# Patient Record
Sex: MALE | Race: WHITE | NOT HISPANIC OR LATINO | Employment: FULL TIME | URBAN - METROPOLITAN AREA
[De-identification: names, ages, dates, MRNs, and addresses within clinical notes are randomized per-mention and may not be internally consistent; named-entity substitution may affect disease eponyms.]

---

## 2017-08-05 ENCOUNTER — ALLSCRIPTS OFFICE VISIT (OUTPATIENT)
Dept: OTHER | Facility: OTHER | Age: 47
End: 2017-08-05

## 2017-09-01 ENCOUNTER — GENERIC CONVERSION - ENCOUNTER (OUTPATIENT)
Dept: OTHER | Facility: OTHER | Age: 47
End: 2017-09-01

## 2017-09-01 ENCOUNTER — ALLSCRIPTS OFFICE VISIT (OUTPATIENT)
Dept: OTHER | Facility: OTHER | Age: 47
End: 2017-09-01

## 2017-09-02 LAB
A/G RATIO (HISTORICAL): 2.7 (ref 1.2–2.2)
ALBUMIN SERPL BCP-MCNC: 4.8 G/DL (ref 3.5–5.5)
ALP SERPL-CCNC: 30 IU/L (ref 39–117)
ALT SERPL W P-5'-P-CCNC: 18 IU/L (ref 0–44)
AMYLASE (HISTORICAL): 67 U/L (ref 31–124)
AST SERPL W P-5'-P-CCNC: 24 IU/L (ref 0–40)
BILIRUB SERPL-MCNC: 0.5 MG/DL (ref 0–1.2)
BUN SERPL-MCNC: 13 MG/DL (ref 6–24)
BUN/CREA RATIO (HISTORICAL): 14 (ref 9–20)
CALCIUM SERPL-MCNC: 9.2 MG/DL (ref 8.7–10.2)
CHLORIDE SERPL-SCNC: 97 MMOL/L (ref 96–106)
CHOLEST SERPL-MCNC: 247 MG/DL (ref 100–199)
CHOLEST/HDLC SERPL: 3.4 RATIO UNITS (ref 0–5)
CO2 SERPL-SCNC: 23 MMOL/L (ref 18–29)
CREAT SERPL-MCNC: 0.91 MG/DL (ref 0.76–1.27)
DEPRECATED RDW RBC AUTO: 13.6 % (ref 12.3–15.4)
EGFR AFRICAN AMERICAN (HISTORICAL): 116 ML/MIN/1.73
EGFR-AMERICAN CALC (HISTORICAL): 101 ML/MIN/1.73
GLUCOSE SERPL-MCNC: 90 MG/DL (ref 65–99)
HCT VFR BLD AUTO: 43 % (ref 37.5–51)
HDLC SERPL-MCNC: 72 MG/DL
HGB BLD-MCNC: 14.1 G/DL (ref 12.6–17.7)
INTERPRETATION (HISTORICAL): NORMAL
LDLC SERPL CALC-MCNC: 153 MG/DL (ref 0–99)
LIPASE SERPL-CCNC: 26 U/L (ref 0–59)
MCH RBC QN AUTO: 31.3 PG (ref 26.6–33)
MCHC RBC AUTO-ENTMCNC: 32.8 G/DL (ref 31.5–35.7)
MCV RBC AUTO: 96 FL (ref 79–97)
PLATELET # BLD AUTO: 279 X10E3/UL (ref 150–379)
POTASSIUM SERPL-SCNC: 4.4 MMOL/L (ref 3.5–5.2)
RBC (HISTORICAL): 4.5 X10E6/UL (ref 4.14–5.8)
SODIUM SERPL-SCNC: 139 MMOL/L (ref 134–144)
TOT. GLOBULIN, SERUM (HISTORICAL): 1.8 G/DL (ref 1.5–4.5)
TOTAL PROTEIN (HISTORICAL): 6.6 G/DL (ref 6–8.5)
TRIGL SERPL-MCNC: 110 MG/DL (ref 0–149)
TSH SERPL DL<=0.05 MIU/L-ACNC: 1.2 UIU/ML (ref 0.45–4.5)
VLDLC SERPL CALC-MCNC: 22 MG/DL (ref 5–40)
WBC # BLD AUTO: 5.2 X10E3/UL (ref 3.4–10.8)

## 2017-09-06 ENCOUNTER — GENERIC CONVERSION - ENCOUNTER (OUTPATIENT)
Dept: OTHER | Facility: OTHER | Age: 47
End: 2017-09-06

## 2018-01-09 ENCOUNTER — GENERIC CONVERSION - ENCOUNTER (OUTPATIENT)
Dept: OTHER | Facility: OTHER | Age: 48
End: 2018-01-09

## 2018-01-10 NOTE — RESULT NOTES
Verified Results  (1) CBC/ PLT (NO DIFF) [id number] 12:00AM Christa Rudder     Test Name Result Flag Reference   WBC 5 2 x10E3/uL  3 4-10 8   RBC 4 50 x10E6/uL  4 14-5 80   Hemoglobin 14 1 g/dL  12 6-17 7   Hematocrit 43 0 %  37 5-51 0   MCV 96 fL  79-97   MCH 31 3 pg  26 6-33 0   MCHC 32 8 g/dL  31 5-35 7   RDW 13 6 %  12 3-15 4   Platelets 669 [id number]/FQ  150-379     (1) TSH WITH FT4 REFLEX 01Sep2017 12:00AM Christa Rudder     Test Name Result Flag Reference   TSH 1 200 uIU/mL  0 450-4 500     [facility]) Cardiovascular Risk Assessment 01Sep2017 12:00AM Christa Rudder     Test Name Result Flag Reference   Interpretation Note     -------------------------------  CARDIOVASCULAR REPORT:  -------------------------------  Current available clinical information suggests the  patient's risk is at least LOW  One major CHD risk factor is  present (age over 39)  If the patient has CHD or a CHD risk  equivalent, the risk category is high  If patient does not  have CHD or a CHD risk equivalent, consider use of the  Pooled Cohort Equations to estimate 10-year CVD risk, as  individuals with greater than 7 5% risk may warrant more  intensive therapy  The calculator can be found at:  http://tools  cardiosource org/JRFMF-Mcul-Pupgvtnlv/  -  Insulin resistance, obesity, excessive alcohol use, smoking,  nephrotic syndrome, liver disease, and certain medications  can cause secondary dyslipidemia  Consider evaluation if  clinically indicated  -  Fasting status is unknown; lipid assessment and treatment  suggestions assume patient was fasting  Therapeutic  lifestyle changes are always valuable to achieve optimal  blood lipid status (diet, exercise, weight management)  -------------------------------  LIPID MANAGEMENT  Select one patient risk category based upon medical history  and clinical judgment   Additional risk factors such as  personal or family history of premature CHD, smoking, and  hypertension modify a patient's goals of therapy  In CVD  prevention, the intensity of therapy should be adjusted to  the level of patient risk  MODERATE intensity statin therapy  generally results in an average LDL-C reduction of 30% to  less than 50% from the untreated baseline  Examples include  (daily doses): atorvastatin 10-20 mg, rosuvastatin 5-10 mg,  simvastatin 20-40 mg, pravastatin 40-80 mg, lovastatin 40  mg  HIGH intensity statin therapy generally results in an  average LDL-C reduction of 50% or more from the untreated  baseline  Examples include (daily doses): atorvastatin 40-80  mg and rosuvastatin 20 mg   -------------------------------  LOW RISK ASSESSMENT AND TREATMENT SUGGESTIONS  -------------------------------  LDL-C is acceptable, was 174 and now is 153 mg/dL  Non-HDL  Cholesterol is acceptable, was 190 and now is 175 mg/dL  -  Considerations for use of statin therapy include family  history of premature atherosclerotic disease, elevated  coronary artery calcium score, ankle-brachial index < 0 9,  elevated CRP, or elevated 10-year or lifetime CVD risk   -------------------------------  INTERMEDIATE RISK ASSESSMENT AND TREATMENT SUGGESTIONS  -------------------------------  LDL-C is borderline high, was 174 and now is 153 mg/dL  Non-HDL Cholesterol is borderline high, was 190 and now is  175 mg/dL  -  Consider beginning or increasing statin  Factors that may  influence statin use include family history of premature  atherosclerotic disease, elevated coronary artery calcium  score, ankle-brachial index < 0 9, elevated CRP, or elevated  10-year or lifetime CVD risk  If statin cannot be tolerated  or increased, alternatives include use of an intestinal  agent (ezetimibe or bile acid sequestrant) or niacin   -------------------------------  HIGH RISK ASSESSMENT AND TREATMENT SUGGESTIONS  -------------------------------  LDL-C is high, was 174 and now is 153 mg/dL  Non-HDL  Cholesterol is high, was 190 and now is 175 mg/dL    -  Begin statin  If statin already in use, consider increasing  dose to achieve at least a 50% LDL reduction from baseline  Moderate or high intensity statin is preferred  If statin  cannot be tolerated or increased, alternatives include use  of an intestinal agent (ezetimibe or bile acid sequestrant)  or niacin   -------------------------------  DISCLAIMER  These assessments and treatment suggestions are provided as  a convenience in support of the physician-patient  relationship and are not intended to replace the physician's  clinical judgment  They are derived from the national  guidelines in addition to other evidence and expert opinion  The clinician should consider this information within the  context of clinical opinion and the individual patient  SEE GUIDANCE FOR CARDIOVASCULAR REPORT: National Heart,  Lung, and Blood Buchanan's Third Report of the NCEP Expert  Panel on Detection, Evaluation and Treatment of High Blood  Cholesterol in Adults (ATP III) (2002  NIH publication  ); Kavita et al  Diabetes Care 2008; 31(4):811-82;  Conthaleigh et al  Clin Chem 2009; 55(3):407-419; Miko Pacheco et  al  2013 ACC/AHA guideline on the treatment of blood  cholesterol to reduce atherosclerotic cardiovascular risk in  adults: a report of the [employer] of  Cardiology/American Heart Association Task Force on Practice  Guidelines  Circulation 7564;280(GNM 2):S1? S45  PDF Image            Signatures   Electronically signed by : JUANA Gabriel; Sep  6 2017  3:29PM EST                       (Author)

## 2018-01-10 NOTE — MISCELLANEOUS
Message  Return to work or school:   Estephania Harrell is under my professional care  He was seen in my office on 9/1/2017  Irvin Donaldson, MSN, APN          Signatures   Electronically signed by : JUANA Augustine; Sep  1 2017 10:01AM EST                       (Author)

## 2018-01-10 NOTE — RESULT NOTES
Verified Results  (1) CBC/ PLT (NO DIFF) [id number] 10:39AM [facility]     Test Name Result Flag Reference   WBC 8 0 x10E3/uL  3 4-10 8   RBC 4 61 x10E6/uL  4 14-5 80   Hemoglobin 14 6 g/dL  12 6-17 7   Hematocrit 42 4 %  37 5-51 0   MCV 92 fL  79-97   MCH 31 7 pg  26 6-33 0   MCHC 34 4 g/dL  31 5-35 7   RDW 13 5 %  12 3-15 4   Platelets 861 [id number]/GQ  150-379     (1) COMPREHENSIVE METABOLIC PANEL [id number] 91:10XN [facility]     Test Name Result Flag Reference   Glucose, Serum 100 mg/dL H 65-99   BUN 12 mg/dL  6-24   Creatinine, Serum 0 79 mg/dL  0 76-1 27   eGFR If NonAfricn Am 108 mL/min/1 73  >59   eGFR If Africn Am 125 mL/min/1 73  >59   BUN/Creatinine Ratio 15  9-20   Sodium, Serum 141 mmol/L  134-144   Potassium, Serum 4 8 mmol/L  3 5-5 2   Chloride, Serum 99 mmol/L     Carbon Dioxide, Total 23 mmol/L  18-29   Calcium, Serum 9 7 mg/dL  8 7-10 2   Protein, Total, Serum 6 7 g/dL  6 0-8 5   Albumin, Serum 4 8 g/dL  3 5-5 5   Globulin, Total 1 9 g/dL  1 5-4 5   A/G Ratio 2 5  1 1-2 5   Bilirubin, Total 0 4 mg/dL  0 0-1 2   Alkaline Phosphatase, S 31 IU/L L    AST (SGOT) 20 IU/L  0-40   ALT (SGPT) 21 IU/L  0-44     (1) PSA (SCREEN) (Dx V76 44 Screen for Prostate Cancer) [id number] 10:39AM [facility]     Test Name Result Flag Reference   Prostate Specific Ag, Serum 0 9 ng/mL  0 0-4 0   Roche ECLIA methodology  According to the American Urological Association, Serum PSA should  decrease and remain at undetectable levels after radical  prostatectomy  The AUA defines biochemical recurrence as an initial  PSA value 0 2 ng/mL or greater followed by a subsequent confirmatory  PSA value 0 2 ng/mL or greater  Values obtained with different assay methods or kits cannot be used  interchangeably  Results cannot be interpreted as absolute evidence  of the presence or absence of malignant disease       (1) LIPID PANEL, FASTING [id number] 10:39AM Casandra Romano     Test Name Result Flag Reference Cholesterol, Total 266 mg/dL H 100-199   Triglycerides 78 mg/dL  0-149   HDL Cholesterol 76 mg/dL  >39   According to ATP-III Guidelines, HDL-C >59 mg/dL is considered a  negative risk factor for CHD  VLDL Cholesterol [place] 16 mg/dL  5-40   LDL Cholesterol Calc 174 mg/dL H 0-99   T  Chol/HDL Ratio 3 5 ratio units  0 0-5 0   T  Chol/HDL Ratio                                                             Men  Women                                               1/2 Avg  Risk  3 4    3 3                                                   Avg Risk  5 0    4 4                                                2X Avg  Risk  9 6    7 1                                                3X Avg  Risk 23 4   11 0     (1) TSH [id number] 10:39AM [facility]     Test Name Result Flag Reference   TSH 0 873 uIU/mL  0 450-4 500     [facility]) Cardiovascular Risk Assessment [id number] 10:39AM [facility]     Test Name Result Flag Reference   Interpretation Note     -------------------------------  CARDIOVASCULAR REPORT:  -------------------------------  Current available clinical information suggests the  patient's risk is at least LOW  If the patient has two or  more major risk factors, the risk category is intermediate  If the patient has CHD or a CHD risk equivalent, the risk  category is high  If patient does not have CHD or a CHD risk  equivalent, consider use of the Pooled Cohort Equations to  estimate 10-year CVD risk, as individuals with greater than  7 5% risk may warrant more intensive therapy  The calculator  can be found at:  http://tools  cardiosource org/WFNGG-Noii-Etvebcmqx/  -  Insulin resistance, obesity, excessive alcohol use, smoking,  nephrotic syndrome, liver disease, and certain medications  can cause secondary dyslipidemia  Consider evaluation if  clinically indicated    -  Therapeutic lifestyle changes are always valuable to achieve  optimal blood lipid status (diet, exercise, weight  management)  -------------------------------  LIPID MANAGEMENT  Select one patient risk category based upon medical history  and clinical judgment  Additional risk factors such as  personal or family history of premature CHD, smoking, and  hypertension modify a patient's goals of therapy  In CVD  prevention, the intensity of therapy should be adjusted to  the level of patient risk  MODERATE intensity statin therapy  generally results in an average LDL-C reduction of 30% to  less than 50% from the untreated baseline  Examples include  (daily doses): atorvastatin 10-20 mg, rosuvastatin 5-10 mg,  simvastatin 20-40 mg, pravastatin 40-80 mg, lovastatin 40  mg  HIGH intensity statin therapy generally results in an  average LDL-C reduction of 50% or more from the untreated  baseline  Examples include (daily doses): atorvastatin 40-80  mg and rosuvastatin 20 mg   -------------------------------  LOW RISK ASSESSMENT AND TREATMENT SUGGESTIONS  -------------------------------  LDL-C is high, 174 mg/dL  Non-HDL Cholesterol is high, 190  mg/dL  -  Consider statin therapy as elevated LDL-C may contribute to  increased CVD risk  If statin cannot be tolerated or  increased, alternatives include use of an intestinal agent  (ezetimibe or bile acid sequestrant) or niacin   -------------------------------  INTERMEDIATE RISK ASSESSMENT AND TREATMENT SUGGESTIONS  -------------------------------  LDL-C is high, 174 mg/dL  Non-HDL Cholesterol is high, 190  mg/dL  -  Begin statin  If statin already in use, consider increasing  dose  If statin cannot be tolerated or increased,  alternatives include use of an intestinal agent (ezetimibe  or bile acid sequestrant) or niacin   -------------------------------  HIGH RISK ASSESSMENT AND TREATMENT SUGGESTIONS  -------------------------------  LDL-C is high, 174 mg/dL  Non-HDL Cholesterol is high, 190  mg/dL  -  Begin statin   If statin already in use, consider increasing  dose to achieve at least a 50% LDL reduction from baseline  Moderate or high intensity statin is preferred  If statin  cannot be tolerated or increased, alternatives include use  of an intestinal agent (ezetimibe or bile acid sequestrant)  or niacin   -------------------------------  DISCLAIMER  These assessments and treatment suggestions are provided as  a convenience in support of the physician-patient  relationship and are not intended to replace the physician's  clinical judgment  They are derived from the national  guidelines in addition to other evidence and expert opinion  The clinician should consider this information within the  context of clinical opinion and the individual patient  SEE GUIDANCE FOR CARDIOVASCULAR REPORT: National Heart,  Lung, and Blood Sarah's Third Report of the NCEP Expert  Panel on Detection, Evaluation and Treatment of High Blood  Cholesterol in Adults (ATP III) (2002  NIH publication  ); Kavita et al  Diabetes Care 2008; 31(4):811-82;  Contois et al  Clin Chem 2009; 55(3):407-419; Jose Fischer et  al  2013 ACC/AHA guideline on the treatment of blood  cholesterol to reduce atherosclerotic cardiovascular risk in  adults: a report of the [employer] of  Cardiology/American Heart Association Task Force on Practice  Guidelines  Circulation 8119;829(ZTNXM 2):S1? S45  PDF Image   Discussion/Summary   blood work overall good      cholesterol elevated  Recommend dietary modification  Start exercise program  Try to quit/decrease smoking  May need to consider medication  Call to discuss     Signatures   Electronically signed by : JUANA Arnett;  Aug 11 2016 12:16PM EST                       (Author)

## 2018-01-11 NOTE — PROGRESS NOTES
Assessment    1  Encounter for preventive health examination (V70 0) (Z00 00)   2  Dyslipidemia (272 4) (E78 5)   3  Former smoker (V15 82) (A50 730)   4  Need for Tdap vaccination (V06 1) (Z23)   5  Abdominal cramping (789 00) (R10 9)    Plan  Abdominal cramping    · (1) AMYLASE; Status:Active; Requested for:01Sep2017;    · (1) LIPASE; Status:Active; Requested for:01Sep2017;    · Call [phone] if: The symptoms come back after a period of time of being normal ;  Status:Complete;   Done: 01Sep2017  Dyslipidemia, Health Maintenance    · (1) CBC/ PLT (NO DIFF); Status:Active; Requested for:01Sep2017;    · (1) COMPREHENSIVE METABOLIC PANEL; Status:Active; Requested for:01Sep2017;    · (1) LIPID PANEL, FASTING; Status:Active; Requested for:01Sep2017;    · (1) TSH WITH FT4 REFLEX; Status:Active; Requested for:01Sep2017; Health Maintenance    · Continue with our present treatment plan ; Status:Complete;   Done: 12IST5561   · Follow-up visit in 1 year Evaluation and Treatment  Follow-up  Status: Hold For -  Scheduling  Requested for: 01Sep2017  Need for influenza vaccination    · Fluzone Quadrivalent Intramuscular Suspension  Need for Tdap vaccination    · Tdap (Adacel)    Discussion/Summary  Impression: health maintenance visit, healthy adult male  Currently, he eats an adequate diet, has an adequate exercise regimen and has an inadequate exercise regimen  Prostate cancer screening: the risks and benefits of prostate cancer screening were discussed and prostate cancer screening is managed by Nely Fisher  Testicular cancer screening: the patient declines testicular cancer screening  Colorectal cancer screening: colorectal cancer screening is not indicated  Screening lab work includes glucose and lipid profile  The risks and benefits of immunizations were discussed  Advice and education were given regarding nutrition, aerobic exercise, weight bearing exercise and cardiovascular risk reduction   Patient discussion: discussed with the patient  Patient clinically stable at present time  Had a long discussion regarding preventive medicine and health maintenance  Discussed associated measures and screenings  Obtain blood work  Discuss when results are available adjust treatment plan accordingly  Recommend healthy diet, appropriate exercise, weight control and modes of risk factor reduction  Patient verbalizes understanding and agreement of the same  Return to office in one year for annual physical and as necessary for health concerns/problems  The patient was counseled regarding instructions for management, risk factor reductions, patient and family education, impressions, risks and benefits of treatment options, importance of compliance with treatment  Chief Complaint  Annual PE  Tdap today  Declines influenza  nil/lpn      History of Present Illness  HM, Adult Male: The patient is being seen for a health maintenance evaluation  The last health maintenance visit was 1 year(s) ago  General Health: The patient's health since the last visit is described as good  He has regular dental visits  He denies vision problems  He denies hearing loss  Immunizations status: not up to date  Lifestyle:  He consumes a diverse and healthy diet  He does not have any weight concerns  He exercises regularly  He does not use tobacco  He denies alcohol use  Reproductive health:  the patient is sexually active  He denies erectile dysfunction  Screening: cancer screening reviewed and current  Prostate cancer screening includes prostate-specific antigen testing last year, a digital rectal examination last year and managed by Dr Sebastien Caceres  Testicular cancer screening includes irregular self testicular examinations and a clinical testicular examination last year  Colorectal cancer screening includes no previous screening  metabolic screening reviewed and current   Metabolic screening includes lipid profile performed within the past five years, glucose screening performed last year, thyroid function test performed last year and no previous DEXA  risk screening reviewed and current  Cardiovascular risk factors: tobacco use and family history of cardiovascular disease, but no hypertension, no obesity and no sedentary lifestyle  Safety elements used: seat belt and safe driving habits  Risk assessments performed include depression symptoms  Risk findings: no depression symptoms  HPI: here for annual PE  in usual state of health  no c/o today  had one episode of abdominal cramping after glass of port wine "Worried it's my gall bladder"      Review of Systems    Constitutional: No fever or chills, feels well, no tiredness, no recent weight gain or weight loss  Eyes: No complaints of eye pain, no red eyes, no discharge from eyes, no itchy eyes  ENT: no complaints of earache, no hearing loss, no nosebleeds, no nasal discharge, no sore throat, no hoarseness  Cardiovascular: No complaints of slow heart rate, no fast heart rate, no chest pain, no palpitations, no leg claudication, no lower extremity  Respiratory: No complaints of shortness of breath, no wheezing, no cough, no SOB on exertion, no orthopnea or PND  Gastrointestinal: No complaints of abdominal pain, no constipation, no nausea or vomiting, no diarrhea or bloody stools  Genitourinary: No complaints of dysuria, no incontinence, no hesitancy, no nocturia, no genital lesion, no testicular pain  Musculoskeletal: No complaints of arthralgia, no myalgias, no joint swelling or stiffness, no limb pain or swelling  Integumentary: except cyst left cheek, but No complaints of skin rash or skin lesions, no itching, no skin wound, no dry skin  Neurological: No compliants of headache, no confusion, no convulsions, no numbness or tingling, no dizziness or fainting, no limb weakness, no difficulty walking     Psychiatric: Is not suicidal, no sleep disturbances, no anxiety or depression, no change in personality, no emotional problems  Endocrine: No complaints of proptosis, no hot flashes, no muscle weakness, no erectile dysfunction, no deepening of the voice, no feelings of weakness  Hematologic/Lymphatic: No complaints of swollen glands, no swollen glands in the neck, does not bleed easily, no easy bruising  Active Problems    1  Carbuncle, ear, right (680 0) (H60 01)   2  Cyst, dermoid, face (216 3) (D23 30)   3  Nephrolithiasis (592 0) (N20 0)   4  Right ear pain (388 70) (H92 01)    Surgical History    · History of Inguinal Hernia Repair   · History of Partial Splenectomy    Family History  Mother    · Family history of Coronary artery disease   · Family history of Emphysema/COPD   · Family history of skin cancer (V16 8) (Z80 8)  Father    · Family history of Emphysema/COPD   · Family history of Myocardial infarction involving other coronary artery  Paternal Uncle    · Family history of diabetes mellitus (V18 0) (Z83 3)  Family History    · Family history of Emphysema/COPD   · Family history of diabetes mellitus (V18 0) (Z83 3)   · Family history of skin cancer (V16 8) (Z80 8)   · Family history of Nephrolithiasis    Social History    · Current Every Day Smoker (305 1)   · Former smoker (V15 82) (H19 012)   · Never Drank Alcohol   · Never Used Drugs    Current Meds   1  No Reported Medications Recorded    Allergies    1  Statins   2  Zetia TABS   3  Levaquin TABS   4  Penicillins    Vitals   Recorded: 01Sep2017 08:55AM   Temperature 98 2 F   Heart Rate 56   Pulse Quality Normal   Respiration Quality Normal   Respiration 16   Systolic 447   Diastolic 60   Height 5 ft 9 in   Weight 139 lb    BMI Calculated 20 53   BSA Calculated 1 77     Physical Exam    Constitutional   General appearance: No acute distress, well appearing and well nourished  Head and Face   Head and face: Normal     Eyes   Conjunctiva and lids: No erythema, swelling or discharge      Pupils and irises: Equal, round, reactive to light  Ears, Nose, Mouth, and Throat   External inspection of ears and nose: Normal     Otoscopic examination: Tympanic membranes translucent with normal light reflex  Canals patent without erythema  Hearing: Normal     Nasal mucosa, septum, and turbinates: Normal without edema or erythema  Lips, teeth, and gums: Normal, good dentition  Oropharynx: Normal with no erythema, edema, exudate or lesions  Neck   Neck: Supple, symmetric, trachea midline, no masses  Thyroid: Normal, no thyromegaly  Pulmonary   Respiratory effort: No increased work of breathing or signs of respiratory distress  Auscultation of lungs: Clear to auscultation  Cardiovascular   Palpation of heart: Normal PMI, no thrills  Auscultation of heart: Normal rate and rhythm, normal S1 and S2, no murmurs  Carotid pulses: 2+ bilaterally  Abdominal aorta: Normal     Pedal pulses: 2+ bilaterally  Examination of extremities for edema and/or varicosities: Normal     Abdomen   Abdomen: Non-tender, no masses  Liver and spleen: No hepatomegaly or splenomegaly  Examination for hernias: No hernias appreciated  Lymphatic   Palpation of lymph nodes in neck: No lymphadenopathy  Musculoskeletal   Gait and station: Normal     Inspection/palpation of digits and nails: Normal without clubbing or cyanosis  Inspection/palpation of joints, bones, and muscles: Normal     Range of motion: Normal     Stability: Normal     Muscle strength/tone: Normal     Skin   Skin and subcutaneous tissue: Normal without rashes or lesions  Examination of the skin for lesions: Abnormal   Flesh-colored, left cheek cyst(s)  Described as mobile, but non-adherent, non-fluctuant, non-tender and not hot  Neurologic   Cranial nerves: Cranial nerves 2-12 intact  Sensation: No sensory loss  Coordination: Normal finger to nose and heel to shin      Psychiatric   Judgment and insight: Normal     Orientation to person, place and time: Normal     Recent and remote memory: Intact  Mood and affect: Normal        Results/Data  PHQ-2 Adult Depression Screening [id number] 08:57AM User, [facility]     Test Name Result Flag Reference   PHQ-2 Adult Depression Score 0     Over the last two weeks, how often have you been bothered by any of the following problems?   Little interest or pleasure in doing things: Not at all - 0  Feeling down, depressed, or hopeless: Not at all - 0   PHQ-2 Adult Depression Screening Negative         Signatures   Electronically signed by : JUANA Bar; Sep  1 2017  9:35AM EST                       (Author)    Electronically signed by : SHIRLENE Rodriguez ; Sep  1 2017  9:43AM EST                       (Author)

## 2018-01-13 VITALS
WEIGHT: 139 LBS | HEART RATE: 56 BPM | TEMPERATURE: 98.2 F | SYSTOLIC BLOOD PRESSURE: 100 MMHG | DIASTOLIC BLOOD PRESSURE: 60 MMHG | BODY MASS INDEX: 20.59 KG/M2 | RESPIRATION RATE: 16 BRPM | HEIGHT: 69 IN

## 2018-01-13 NOTE — PROGRESS NOTES
Assessment    1  Nephrolithiasis (592 0) (N20 0)   2  Encounter for preventive health examination (V70 0) (Z00 00)   3  Encounter for prostate cancer screening (V76 44) (Z12 5)   4  Thyroid disorder screen (V77 0) (Z13 29)   5  Diabetes mellitus screening (V77 1) (Z13 1)   6  Encounter for lipid screening for cardiovascular disease (V77 91,V81 2) (Z13 220,Z13 6)   7  Cyst, dermoid, face (216 3) (D23 30)   8  Family history of Coronary artery disease : Mother   5  Family history of Myocardial infarction involving other coronary artery : Father   8  Family history of Emphysema/COPD : Family History, Mother, Father   6  Family history of skin cancer (V16 8) (Z80 8) : Mother, Family History    Plan  Cyst, dermoid, face    · Shu Ramirez MD, [facility]  (Dermatology) Physician Referral  Consult  Status: Hold For -  Scheduling  Requested for: [id number]  Care Summary provided  : Yes  Diabetes mellitus screening, Health Maintenance, Nephrolithiasis    · (1) CBC/ PLT (NO DIFF); Status:Active; Requested for:24Abl1185;    · (1) COMPREHENSIVE METABOLIC PANEL; Status:Active; Requested for:10Xcw0931;   Encounter for lipid screening for cardiovascular disease, Health Maintenance    · (1) LIPID PANEL, FASTING; Status:Active; Requested for:25Qwg6925; Health Maintenance    · Drink plenty of fluids ; Status:Complete;   Done: 14VRC9719   · Eat a low fat and low cholesterol diet ; Status:Complete;   Done: 26TMT1703   · There are ways to decrease your stress and improve your sense of well-being  We  encourage you to keep active and exercise regularly  Make time to take care of yourself  and participate in activities that you enjoy  Stay connected to friends and family that can  support and comfort you  If at any time you have thoughts of harming yourself or  someone else, contact us immediately ; Status:Active; Requested for:54Hfy5777;    · We encourage all of our patients to exercise regularly    30 minutes of exercise or physical  activity five or more days a week is recommended for children and adults ;  Status:Complete;   Done: 93SEJ7183   · You need to quit smoking ; Status:Complete;   Done: 16VPL9742   · You need to quit smoking ; Status:Complete;   Done: 18GKF1521   · Call [phone] if: You have any warning signs of skin cancer ; Status:Complete;    Done: 40ZCW5741   · Call 911 if: You experience a new kind of chest pain (angina) or pressure ;  Status:Complete;   Done: 78DTK5497  Health Maintenance, Encounter for prostate cancer screening    · (1) PSA (SCREEN) (Dx V76 44 Screen for Prostate Cancer); Status:Active; Requested  for:72Nra8836; Health Maintenance, Thyroid disorder screen    · (1) TSH; Status:Active; Requested for:25Drt5593;    · Follow-up visit in 1 year Evaluation and Treatment  Follow-up  Status: Hold For -  Scheduling  Requested for: 49Jqw1666  Nephrolithiasis    · 2 - Shekhar Powell MD , [facility]  (Urology) Physician Referral  Consult  Status: Hold For -  Scheduling  Requested for: 20YPZ7971  Care Summary provided  : Yes    Discussion/Summary  Impression: health maintenance visit, healthy adult male  Currently, he eats an adequate diet and has an inadequate exercise regimen  Prostate cancer screening: the risks and benefits of prostate cancer screening were discussed and prostate cancer screening is managed by Shekhar Powell  Testicular cancer screening: the patient declines testicular cancer screening  Colorectal cancer screening: colorectal cancer screening is not indicated  Screening lab work includes glucose and lipid profile  The risks and benefits of immunizations were discussed  Advice and education were given regarding nutrition, aerobic exercise, weight bearing exercise and cardiovascular risk reduction  Patient discussion: discussed with the patient  Patient clinically stable at present time  Had a long discussion regarding preventive medicine and health maintenance   Discussed associated measures and screenings  Obtain blood work  Discuss when results are available adjust treatment plan accordingly  Recommend healthy diet, appropriate exercise, weight control and modes of risk factor reduction  Patient verbalizes understanding and agreement of the same  Return to office in one year for annual physical and as necessary for health concerns/problems  The patient was counseled regarding instructions for management, risk factor reductions, patient and family education, impressions, risks and benefits of treatment options, importance of compliance with treatment  Chief Complaint  PE see's eye   regularly stopped taking crestor  ck/lpn      History of Present Illness  HM, Adult Male: The patient is being seen for a health maintenance evaluation  The last health maintenance visit was 2 year(s) ago, Former patient of Dr Jazzy Hawkins who retired  General Health: The patient's health since the last visit is described as good  He has regular dental visits  He denies vision problems  He denies hearing loss  Immunizations status: up to date  Lifestyle:  He consumes a diverse and healthy diet  He does not have any weight concerns  He exercises regularly  He does not use tobacco  He denies alcohol use  He denies drug use  Reproductive health:  the patient is sexually active  He denies erectile dysfunction  Screening: cancer screening reviewed and current  Prostate cancer screening includes prostate-specific antigen testing last year, a digital rectal examination last year and managed by Dr Marcella Stringer  Testicular cancer screening includes irregular self testicular examinations and a clinical testicular examination last year  Colorectal cancer screening includes no previous screening  metabolic screening reviewed and current   Metabolic screening includes uncertain timing of his last lipid profile, uncertain timing of his last glucose screening, uncertain timing of his last thyroid function test and no previous DEXA    risk screening reviewed and current  Cardiovascular risk factors: tobacco use and family history of cardiovascular disease, but no hypertension, no obesity and no sedentary lifestyle  Safety elements used: seat belt and safe driving habits  Risk assessments performed include depression symptoms  Risk findings: no depression symptoms  Review of Systems    Constitutional: No fever or chills, feels well, no tiredness, no recent weight gain or weight loss  Eyes: No complaints of eye pain, no red eyes, no discharge from eyes, no itchy eyes  ENT: no complaints of earache, no hearing loss, no nosebleeds, no nasal discharge, no sore throat, no hoarseness  Cardiovascular: No complaints of slow heart rate, no fast heart rate, no chest pain, no palpitations, no leg claudication, no lower extremity  Respiratory: No complaints of shortness of breath, no wheezing, no cough, no SOB on exertion, no orthopnea or PND  Gastrointestinal: No complaints of abdominal pain, no constipation, no nausea or vomiting, no diarrhea or bloody stools  Genitourinary: No complaints of dysuria, no incontinence, no hesitancy, no nocturia, no genital lesion, no testicular pain  Musculoskeletal: No complaints of arthralgia, no myalgias, no joint swelling or stiffness, no limb pain or swelling  Integumentary: except cyst left cheek, but No complaints of skin rash or skin lesions, no itching, no skin wound, no dry skin  Neurological: No compliants of headache, no confusion, no convulsions, no numbness or tingling, no dizziness or fainting, no limb weakness, no difficulty walking  Psychiatric: Is not suicidal, no sleep disturbances, no anxiety or depression, no change in personality, no emotional problems  Endocrine: No complaints of proptosis, no hot flashes, no muscle weakness, no erectile dysfunction, no deepening of the voice, no feelings of weakness     Hematologic/Lymphatic: No complaints of swollen glands, no swollen glands in the neck, does not bleed easily, no easy bruising  Active Problems    1  Nephrolithiasis (592 0) (N20 0)    Surgical History    · History of Inguinal Hernia Repair   · History of Partial Splenectomy    Family History  Mother    · Family history of Coronary artery disease   · Family history of Emphysema/COPD   · Family history of skin cancer (V16 8) (Z80 8)  Father    · Family history of Emphysema/COPD   · Family history of Myocardial infarction involving other coronary artery  Paternal Uncle    · Family history of diabetes mellitus (V18 0) (Z83 3)  Family History    · Family history of Emphysema/COPD   · Family history of diabetes mellitus (V18 0) (Z83 3)   · Family history of skin cancer (V16 8) (Z80 8)   · Family history of Nephrolithiasis    Social History    · Current Every Day Smoker (305 1)   · Never Drank Alcohol   · Never Used Drugs    Current Meds   1  Voltaren 75 MG TBEC; PRN; Therapy: (Recorded:08Nov2013) to Recorded    Allergies    1  Levaquin TABS   2  Penicillins    Vitals   Recorded: 46WTH0428 14:95MX   Systolic 302, RUE, Sitting   Diastolic 70, RUE, Sitting   Heart Rate 72, R Radial   Pulse Quality Normal, R Radial   Respiration Quality Normal   Respiration 14   Temperature 97 8 F   Height 5 ft 9 in   Weight 136 lb    BMI Calculated 20 08   BSA Calculated 1 75     Physical Exam    Constitutional   General appearance: No acute distress, well appearing and well nourished  Eyes   Conjunctiva and lids: No erythema, swelling or discharge  Pupils and irises: Equal, round, reactive to light  Ears, Nose, Mouth, and Throat   External inspection of ears and nose: Normal     Otoscopic examination: Tympanic membranes translucent with normal light reflex  Canals patent without erythema  Hearing: Normal     Nasal mucosa, septum, and turbinates: Normal without edema or erythema  Lips, teeth, and gums: Normal, good dentition      Oropharynx: Normal with no erythema, edema, exudate or lesions  Neck   Neck: Supple, symmetric, trachea midline, no masses  Thyroid: Normal, no thyromegaly  Pulmonary   Respiratory effort: No increased work of breathing or signs of respiratory distress  Auscultation of lungs: Clear to auscultation  Cardiovascular   Palpation of heart: Normal PMI, no thrills  Auscultation of heart: Normal rate and rhythm, normal S1 and S2, no murmurs  Carotid pulses: 2+ bilaterally  Abdominal aorta: Normal     Pedal pulses: 2+ bilaterally  Examination of extremities for edema and/or varicosities: Normal     Abdomen   Abdomen: Non-tender, no masses  Liver and spleen: No hepatomegaly or splenomegaly  Examination for hernias: No hernias appreciated  Lymphatic   Palpation of lymph nodes in neck: No lymphadenopathy  Musculoskeletal   Gait and station: Normal     Inspection/palpation of digits and nails: Normal without clubbing or cyanosis  Inspection/palpation of joints, bones, and muscles: Normal     Range of motion: Normal     Stability: Normal     Muscle strength/tone: Normal     Skin   Skin and subcutaneous tissue: Normal without rashes or lesions  Examination of the skin for lesions: Abnormal   Flesh-colored, left cheek cyst(s)  Described as mobile, but non-adherent, non-fluctuant, non-tender and not hot  Neurologic   Cranial nerves: Cranial nerves 2-12 intact  Reflexes: 2+ and symmetric  Sensation: No sensory loss  Psychiatric   Judgment and insight: Normal     Orientation to person, place and time: Normal     Recent and remote memory: Intact  Mood and affect: Normal        Results/Data  PHQ-2 Adult Depression Screening [id number] 09:30AM Mindy Keblayne     Test Name Result Flag Reference   PHQ-2 Adult Depression Score 0     Over the last two weeks, how often have you been bothered by any of the following problems?   Little interest or pleasure in doing things: Not at all - 0  Feeling down, depressed, or hopeless: Not at all - 0   PHQ-2 Adult Depression Screening Negative         Signatures   Electronically signed by : JUANA Holloway;  Aug  5 2016  9:38AM EST                       (Author)    Electronically signed by : SHIRLENE Davies ; Aug  5 2016  9:40AM EST                       (Author)

## 2018-01-14 VITALS
OXYGEN SATURATION: 98 % | SYSTOLIC BLOOD PRESSURE: 90 MMHG | TEMPERATURE: 98 F | DIASTOLIC BLOOD PRESSURE: 70 MMHG | HEART RATE: 51 BPM | RESPIRATION RATE: 16 BRPM

## 2018-01-17 NOTE — RESULT NOTES
Discussion/Summary   labs are in good range   cholesterol improved from last year  still borderline  can try adding fish oil 1000mg with breakfast or supper  Verified Results  (1) COMPREHENSIVE METABOLIC PANEL [id number] 61:68SF [facility] Session     Test Name Result Flag Reference   Glucose, Serum 90 mg/dL  65-99   BUN 13 mg/dL  6-24   Creatinine, Serum 0 91 mg/dL  0 76-1 27   BUN/Creatinine Ratio 14  9-20   Sodium, Serum 139 mmol/L  134-144   Potassium, Serum 4 4 mmol/L  3 5-5 2   Chloride, Serum 97 mmol/L     Carbon Dioxide, Total 23 mmol/L  18-29   Calcium, Serum 9 2 mg/dL  8 7-10 2   Protein, Total, Serum 6 6 g/dL  6 0-8 5   Albumin, Serum 4 8 g/dL  3 5-5 5   Globulin, Total 1 8 g/dL  1 5-4 5   A/G Ratio 2 7 H 1 2-2 2   Bilirubin, Total 0 5 mg/dL  0 0-1 2   Alkaline Phosphatase, S 30 IU/L L    AST (SGOT) 24 IU/L  0-40   ALT (SGPT) 18 IU/L  0-44   eGFR If NonAfricn Am 101 mL/min/1 73  >59   eGFR If Africn Am 116 mL/min/1 73  >59     (1) LIPID PANEL, FASTING 01Sep2017 12:00AM [employer]     Test Name Result Flag Reference   Cholesterol, Total 247 mg/dL H 100-199   Triglycerides 110 mg/dL  0-149   HDL Cholesterol 72 mg/dL  >39   VLDL Cholesterol [place] 22 mg/dL  5-40   LDL Cholesterol Calc 153 mg/dL H 0-99   T  Chol/HDL Ratio 3 4 ratio units  0 0-5 0   T  Chol/HDL Ratio                                                             Men  Women                                               1/2 Avg  Risk  3 4    3 3                                                   Avg Risk  5 0    4 4                                                2X Avg  Risk  9 6    7 1                                                3X Avg  Risk 23 4   11 0     (1) AMYLASE 01Sep2017 12:00AM [facility] Session     Test Name Result Flag Reference   Amylase, Serum 67 U/L       (1) LIPASE 01Sep2017 12:00AM [facility] Session     Test Name Result Flag Reference   Lipase, Serum 26 U/L  0-59       Signatures   Electronically signed by : JUANA Moss; Sep  6 2017  9:35AM EST                       (Author)

## 2018-01-24 VITALS
WEIGHT: 142 LBS | HEART RATE: 56 BPM | DIASTOLIC BLOOD PRESSURE: 62 MMHG | BODY MASS INDEX: 21.03 KG/M2 | HEIGHT: 69 IN | SYSTOLIC BLOOD PRESSURE: 106 MMHG | RESPIRATION RATE: 16 BRPM | TEMPERATURE: 97.6 F

## 2018-02-23 ENCOUNTER — TRANSCRIBE ORDERS (OUTPATIENT)
Dept: RADIOLOGY | Facility: CLINIC | Age: 48
End: 2018-02-23

## 2018-02-23 ENCOUNTER — APPOINTMENT (OUTPATIENT)
Dept: RADIOLOGY | Facility: CLINIC | Age: 48
End: 2018-02-23
Payer: COMMERCIAL

## 2018-02-23 DIAGNOSIS — Z87.442 PERSONAL HISTORY OF URINARY CALCULI: Primary | ICD-10-CM

## 2018-02-23 DIAGNOSIS — Z87.442 PERSONAL HISTORY OF URINARY CALCULI: ICD-10-CM

## 2018-02-23 PROCEDURE — 74018 RADEX ABDOMEN 1 VIEW: CPT

## 2018-03-16 DIAGNOSIS — G89.29 CHRONIC BILATERAL LOW BACK PAIN WITHOUT SCIATICA: Primary | ICD-10-CM

## 2018-03-16 DIAGNOSIS — M54.50 CHRONIC BILATERAL LOW BACK PAIN WITHOUT SCIATICA: Primary | ICD-10-CM

## 2018-03-16 RX ORDER — TRAMADOL HYDROCHLORIDE 50 MG/1
TABLET ORAL
Qty: 15 TABLET | Refills: 1 | OUTPATIENT
Start: 2018-03-16 | End: 2018-05-03 | Stop reason: SDUPTHER

## 2018-04-16 DIAGNOSIS — N52.9 ED (ERECTILE DYSFUNCTION) OF ORGANIC ORIGIN: Primary | ICD-10-CM

## 2018-04-16 RX ORDER — TADALAFIL 10 MG/1
1 TABLET ORAL
COMMUNITY
Start: 2018-01-09 | End: 2018-04-16 | Stop reason: SDUPTHER

## 2018-04-17 RX ORDER — TADALAFIL 10 MG/1
TABLET ORAL
Qty: 10 TABLET | Refills: 0 | Status: SHIPPED | OUTPATIENT
Start: 2018-04-17 | End: 2019-02-08 | Stop reason: ALTCHOICE

## 2018-05-03 DIAGNOSIS — M54.50 CHRONIC BILATERAL LOW BACK PAIN WITHOUT SCIATICA: ICD-10-CM

## 2018-05-03 DIAGNOSIS — G89.29 CHRONIC BILATERAL LOW BACK PAIN WITHOUT SCIATICA: ICD-10-CM

## 2018-05-03 RX ORDER — TRAMADOL HYDROCHLORIDE 50 MG/1
TABLET ORAL
Qty: 15 TABLET | Refills: 1 | Status: SHIPPED | OUTPATIENT
Start: 2018-05-03 | End: 2018-06-10 | Stop reason: SDUPTHER

## 2018-06-10 DIAGNOSIS — M54.50 CHRONIC BILATERAL LOW BACK PAIN WITHOUT SCIATICA: ICD-10-CM

## 2018-06-10 DIAGNOSIS — G89.29 CHRONIC BILATERAL LOW BACK PAIN WITHOUT SCIATICA: ICD-10-CM

## 2018-06-11 RX ORDER — TRAMADOL HYDROCHLORIDE 50 MG/1
TABLET ORAL
Qty: 15 TABLET | Refills: 1 | Status: SHIPPED | OUTPATIENT
Start: 2018-06-11 | End: 2018-08-09 | Stop reason: SDUPTHER

## 2018-07-05 ENCOUNTER — TELEPHONE (OUTPATIENT)
Dept: FAMILY MEDICINE CLINIC | Facility: CLINIC | Age: 48
End: 2018-07-05

## 2018-08-09 DIAGNOSIS — G89.29 CHRONIC BILATERAL LOW BACK PAIN WITHOUT SCIATICA: ICD-10-CM

## 2018-08-09 DIAGNOSIS — M54.50 CHRONIC BILATERAL LOW BACK PAIN WITHOUT SCIATICA: ICD-10-CM

## 2018-08-09 RX ORDER — TRAMADOL HYDROCHLORIDE 50 MG/1
TABLET ORAL
Qty: 15 TABLET | Refills: 1 | Status: SHIPPED | OUTPATIENT
Start: 2018-08-09 | End: 2018-09-20 | Stop reason: SDUPTHER

## 2018-09-20 DIAGNOSIS — M54.50 CHRONIC BILATERAL LOW BACK PAIN WITHOUT SCIATICA: ICD-10-CM

## 2018-09-20 DIAGNOSIS — G89.29 CHRONIC BILATERAL LOW BACK PAIN WITHOUT SCIATICA: ICD-10-CM

## 2018-09-20 RX ORDER — TRAMADOL HYDROCHLORIDE 50 MG/1
TABLET ORAL
Qty: 15 TABLET | Refills: 1 | Status: SHIPPED | OUTPATIENT
Start: 2018-09-20 | End: 2018-10-31 | Stop reason: SDUPTHER

## 2018-10-31 DIAGNOSIS — M54.50 CHRONIC BILATERAL LOW BACK PAIN WITHOUT SCIATICA: ICD-10-CM

## 2018-10-31 DIAGNOSIS — G89.29 CHRONIC BILATERAL LOW BACK PAIN WITHOUT SCIATICA: ICD-10-CM

## 2018-11-01 RX ORDER — TRAMADOL HYDROCHLORIDE 50 MG/1
TABLET ORAL
Qty: 15 TABLET | Refills: 1 | Status: SHIPPED | OUTPATIENT
Start: 2018-11-01 | End: 2018-11-26 | Stop reason: SDUPTHER

## 2018-11-26 ENCOUNTER — OFFICE VISIT (OUTPATIENT)
Dept: FAMILY MEDICINE CLINIC | Facility: CLINIC | Age: 48
End: 2018-11-26
Payer: COMMERCIAL

## 2018-11-26 VITALS
DIASTOLIC BLOOD PRESSURE: 70 MMHG | HEIGHT: 69 IN | TEMPERATURE: 98.7 F | HEART RATE: 88 BPM | BODY MASS INDEX: 20.47 KG/M2 | SYSTOLIC BLOOD PRESSURE: 108 MMHG | WEIGHT: 138.2 LBS | RESPIRATION RATE: 16 BRPM

## 2018-11-26 DIAGNOSIS — R06.00 EXERTIONAL DYSPNEA: ICD-10-CM

## 2018-11-26 DIAGNOSIS — R03.0 BLOOD PRESSURE ELEVATED WITHOUT HISTORY OF HTN: ICD-10-CM

## 2018-11-26 DIAGNOSIS — Z13.29 THYROID DISORDER SCREEN: ICD-10-CM

## 2018-11-26 DIAGNOSIS — Z87.891 FORMER SMOKER: ICD-10-CM

## 2018-11-26 DIAGNOSIS — Z87.891 FORMER TOBACCO USE: ICD-10-CM

## 2018-11-26 DIAGNOSIS — Z00.00 HEALTH CARE MAINTENANCE: Primary | ICD-10-CM

## 2018-11-26 DIAGNOSIS — Z13.1 DIABETES MELLITUS SCREENING: ICD-10-CM

## 2018-11-26 DIAGNOSIS — Z13.0 SCREENING FOR DEFICIENCY ANEMIA: ICD-10-CM

## 2018-11-26 DIAGNOSIS — M54.50 CHRONIC BILATERAL LOW BACK PAIN WITHOUT SCIATICA: ICD-10-CM

## 2018-11-26 DIAGNOSIS — Z12.5 PROSTATE CANCER SCREENING: ICD-10-CM

## 2018-11-26 DIAGNOSIS — E78.5 DYSLIPIDEMIA: ICD-10-CM

## 2018-11-26 DIAGNOSIS — G89.29 CHRONIC BILATERAL LOW BACK PAIN WITHOUT SCIATICA: ICD-10-CM

## 2018-11-26 DIAGNOSIS — Z00.00 ANNUAL PHYSICAL EXAM: ICD-10-CM

## 2018-11-26 PROBLEM — N52.9 ERECTILE DYSFUNCTION OF ORGANIC ORIGIN: Status: ACTIVE | Noted: 2018-01-09

## 2018-11-26 PROBLEM — M19.049 HAND ARTHROPATHY: Status: ACTIVE | Noted: 2018-01-09

## 2018-11-26 PROBLEM — F17.200 TOBACCO DEPENDENCE: Status: ACTIVE | Noted: 2018-11-26

## 2018-11-26 PROCEDURE — 36415 COLL VENOUS BLD VENIPUNCTURE: CPT | Performed by: NURSE PRACTITIONER

## 2018-11-26 PROCEDURE — 94010 BREATHING CAPACITY TEST: CPT | Performed by: NURSE PRACTITIONER

## 2018-11-26 PROCEDURE — 99396 PREV VISIT EST AGE 40-64: CPT | Performed by: NURSE PRACTITIONER

## 2018-11-26 RX ORDER — TRAMADOL HYDROCHLORIDE 50 MG/1
TABLET ORAL
Qty: 45 TABLET | Refills: 2 | Status: SHIPPED | OUTPATIENT
Start: 2018-11-26 | End: 2019-03-13 | Stop reason: SDUPTHER

## 2018-11-26 NOTE — PROGRESS NOTES
ADULT ANNUAL PHYSICAL  [facility]    NAME: Noy Salmon  AGE: 50 y o  SEX: male  : 1970     DATE: 2018     Assessment and Plan:     Diagnoses and all orders for this visit:    Health care maintenance    Chronic bilateral low back pain without sciatica  -     traMADol (ULTRAM) 50 mg tablet; May take 1 tab as needed daily for multi-site arthritic pain, may repeat 1/2 tab in evening if needed  Max 1 5 tab daily    Exertional dyspnea  -     POCT spirometry  -     Basic metabolic panel  -     CBC and Platelet  -     Stress test only, exercise; Future  -     Echo complete with contrast if indicated; Future    Dyslipidemia  -     Lipid panel    Blood pressure elevated without history of HTN  -     TSH, 3rd generation with Free T4 reflex    Former smoker  -     POCT spirometry    Annual physical exam    Thyroid disorder screen  -     TSH, 3rd generation with Free T4 reflex    Diabetes mellitus screening  -     Basic metabolic panel    Screening for deficiency anemia  -     CBC and Platelet    Prostate cancer screening  -     PSA (Reflex To Free) (Serial)    Former tobacco use        Health maintenance and preventative care screenings were discussed with patient today  Appropriate education was printed on patient's after visit summary  · Discussed risks/benefits of screening for prostate cancer, high cholesterol and diabetes  Patient agrees to screening for prostate cancer, high cholesterol and diabetes  · Immunizations were reviewed: patient declines influenza vaccine and tetanus vaccine  Counseling:  Dental Health: discussed importance of regular tooth brushing, flossing, and dental visits  Injury prevention: discussed safety/seat belts, safety helmets, smoke detectors, carbon dioxide detectors, and smoking near bedding or upholstery  BMI Counseling: Body mass index is 20 41 kg/m²  Discussed with patient's BMI with him   The BMI is in the acceptable range   · Sexual health: discussed sexually transmitted diseases, partner selection, use of condoms, avoidance of unintended pregnancy, and contraceptive alternatives  Return in about 1 year (around 11/26/2019)  Chief Complaint:     Chief Complaint   Patient presents with    Physical Exam     discuss BP      History of Present Illness:     Adult Annual Physical   Patient here for a comprehensive physical exam  The patient reports problems - worsening exertional dyspnea over last year  started when stopped going to gym  Denies chest pain, dizziness, syncope, dyspnea at rest   No significant fam h/o CAD  former smoker  quit 1 5 years ago  Diet and Physical Activity  · Diet/Nutrition: well balanced diet  · Weight concerns: none, patient's BMI is between 18 5-24 9  · Exercise: walking  Depression Screening  PHQ-9 Depression Screening    PHQ-9:    Frequency of the following problems over the past two weeks:       Little interest or pleasure in doing things:  0 - not at all  Feeling down, depressed, or hopeless:  0 - not at all  PHQ-2 Score:  0       General Health  · Sleep: sleeps well  · Hearing: normal - bilateral   · Vision: wears glasses  · Dental: regular dental visits   Health  · Erectile dysfunction: no      Review of Systems:     Review of Systems   Respiratory:        Exertional dyspnea per hpi   All other systems reviewed and are negative  Past Medical History:     History reviewed  No pertinent past medical history     Past Surgical History:     Past Surgical History:   Procedure Laterality Date    HERNIA REPAIR      Inguinal hernia repair, left     SPLENECTOMY, PARTIAL        Social History:     Social History     Social History    Marital status: /Civil Union     Spouse name: N/A    Number of children: N/A    Years of education: N/A     Social History Main Topics    Smoking status: Former Smoker    Smokeless tobacco: Former User    Alcohol use No    Drug use: No    Sexual activity: Not Asked     Other Topics Concern    None     Social History Narrative    History of current every day smoker - as per [employer]    Former smoker - as per [employer]       Family History:     Family History   Problem Relation Age of Onset    Coronary artery disease Mother     Emphysema Mother         Emphysema/COPD    Skin cancer Mother     Emphysema Father         Emphysema/COPD    Heart attack Father         MI involving other coronary artery     Diabetes Paternal Uncle     Emphysema Family     Diabetes Family     Skin cancer Family     Nephrolithiasis Family       Current Medications:     Current Outpatient Prescriptions   Medication Sig Dispense Refill    traMADol (ULTRAM) 50 mg tablet May take 1 tab as needed daily for multi-site arthritic pain, may repeat 1/2 tab in evening if needed  Max 1 5 tab daily 45 tablet 2    tadalafil (CIALIS) 10 MG tablet Take one tab 30 min prior to sexual activity  Do not take more than one every 72 hours (Patient not taking: Reported on 11/26/2018 ) 10 tablet 0     No current facility-administered medications for this visit  Allergies: Allergies   Allergen Reactions    Levofloxacin     Penicillins     Statins Myalgia    Ezetimibe Rash      Objective:     /70   Pulse 88   Temp 98 7 °F (37 1 °C)   Resp 16   Ht 5' 9" (1 753 m)   Wt 62 7 kg (138 lb 3 2 oz)   BMI 20 41 kg/m²   Physical Exam   Constitutional: He is oriented to person, place, and time  Vital signs are normal  He appears well-developed and well-nourished  No distress  HENT:   Head: Normocephalic and atraumatic  Right Ear: External ear normal    Left Ear: External ear normal    Nose: Nose normal    Mouth/Throat: Oropharynx is clear and moist    Eyes: Pupils are equal, round, and reactive to light  Conjunctivae, EOM and lids are normal    Neck: Normal range of motion  Neck supple  Carotid bruit is not present  No thyromegaly present     Cardiovascular: Normal rate, regular rhythm, normal heart sounds and intact distal pulses  No murmur heard  Pulmonary/Chest: Effort normal and breath sounds normal  No respiratory distress  Abdominal: Soft  Bowel sounds are normal  He exhibits no distension  There is no hepatosplenomegaly  There is no tenderness  No hernia  Musculoskeletal: He exhibits no edema or deformity  Lymphadenopathy:     He has no cervical adenopathy  Neurological: He is alert and oriented to person, place, and time  No cranial nerve deficit or sensory deficit  He exhibits normal muscle tone  Coordination normal    Skin: Skin is warm, dry and intact  Capillary refill takes less than 2 seconds  Psychiatric: He has a normal mood and affect  Nursing note and vitals reviewed      Pulmonary Functions Testing Results:  Normal study     Health Maintenance:     Health Maintenance Topics with due status: Not Due       Topic Last Completion Date    DTaP,Tdap,and Td Vaccines 09/01/2017     Health Maintenance Topics with due status: Overdue       Topic Date Due    Depression Screening PHQ 1970     Immunization History   Administered Date(s) Administered    Tdap 09/01/2017       Brandy Phoenix, [id number] Located within [facility] [address]

## 2018-11-26 NOTE — LETTER
November 26, 2018     Patient: Mikayla Seen   YOB: 1970   Date of Visit: 11/26/2018       To Whom it May Concern:    Mikayla Seen is under my professional care  He was seen in my office on 11/26/2018  He may return to work on 11/27/18  If you have any questions or concerns, please don't hesitate to call           Sincerely,          ELIESER Oneal        CC: No Recipients

## 2018-11-26 NOTE — PATIENT INSTRUCTIONS

## 2018-11-27 ENCOUNTER — TELEPHONE (OUTPATIENT)
Dept: FAMILY MEDICINE CLINIC | Facility: CLINIC | Age: 48
End: 2018-11-27

## 2018-11-27 LAB
BASOPHILS # BLD AUTO: 0 X10E3/UL (ref 0–0.2)
BASOPHILS NFR BLD AUTO: 0 %
BUN SERPL-MCNC: 9 MG/DL (ref 6–24)
BUN/CREAT SERPL: 10 (ref 9–20)
CALCIUM SERPL-MCNC: 9.8 MG/DL (ref 8.7–10.2)
CHLORIDE SERPL-SCNC: 104 MMOL/L (ref 96–106)
CHOLEST SERPL-MCNC: 206 MG/DL (ref 100–199)
CO2 SERPL-SCNC: 26 MMOL/L (ref 20–29)
CREAT SERPL-MCNC: 0.87 MG/DL (ref 0.76–1.27)
EOSINOPHIL # BLD AUTO: 0.1 X10E3/UL (ref 0–0.4)
EOSINOPHIL NFR BLD AUTO: 1 %
ERYTHROCYTE [DISTWIDTH] IN BLOOD BY AUTOMATED COUNT: 13.6 % (ref 12.3–15.4)
GLUCOSE SERPL-MCNC: 94 MG/DL (ref 65–99)
HCT VFR BLD AUTO: 43.1 % (ref 37.5–51)
HDLC SERPL-MCNC: 85 MG/DL
HGB BLD-MCNC: 14.5 G/DL (ref 13–17.7)
IMM GRANULOCYTES # BLD: 0 X10E3/UL (ref 0–0.1)
IMM GRANULOCYTES NFR BLD: 0 %
LDLC SERPL CALC-MCNC: 98 MG/DL (ref 0–99)
LYMPHOCYTES # BLD AUTO: 2.1 X10E3/UL (ref 0.7–3.1)
LYMPHOCYTES NFR BLD AUTO: 29 %
MCH RBC QN AUTO: 30.7 PG (ref 26.6–33)
MCHC RBC AUTO-ENTMCNC: 33.6 G/DL (ref 31.5–35.7)
MCV RBC AUTO: 91 FL (ref 79–97)
MICRODELETION SYND BLD/T FISH: NORMAL
MONOCYTES # BLD AUTO: 0.5 X10E3/UL (ref 0.1–0.9)
MONOCYTES NFR BLD AUTO: 7 %
NEUTROPHILS # BLD AUTO: 4.6 X10E3/UL (ref 1.4–7)
NEUTROPHILS NFR BLD AUTO: 63 %
PLATELET # BLD AUTO: 311 X10E3/UL (ref 150–379)
POTASSIUM SERPL-SCNC: 5.5 MMOL/L (ref 3.5–5.2)
PSA FREE MFR SERPL: 35.4 %
PSA FREE SERPL-MCNC: 0.46 NG/ML
PSA SERPL-MCNC: 1.3 NG/ML (ref 0–4)
RBC # BLD AUTO: 4.73 X10E6/UL (ref 4.14–5.8)
SL AMB EGFR AFRICAN AMERICAN: 118 ML/MIN/1.73
SL AMB EGFR NON AFRICAN AMERICAN: 102 ML/MIN/1.73
SL AMB VLDL CHOLESTEROL CALC: 23 MG/DL (ref 5–40)
SODIUM SERPL-SCNC: 143 MMOL/L (ref 134–144)
TRIGL SERPL-MCNC: 116 MG/DL (ref 0–149)
TSH SERPL DL<=0.005 MIU/L-ACNC: 0.77 UIU/ML (ref 0.45–4.5)
WBC # BLD AUTO: 7.3 X10E3/UL (ref 3.4–10.8)

## 2018-12-14 ENCOUNTER — HOSPITAL ENCOUNTER (OUTPATIENT)
Dept: NON INVASIVE DIAGNOSTICS | Facility: HOSPITAL | Age: 48
Discharge: HOME | End: 2018-12-14
Payer: COMMERCIAL

## 2018-12-14 ENCOUNTER — TELEPHONE (OUTPATIENT)
Dept: FAMILY MEDICINE CLINIC | Facility: CLINIC | Age: 48
End: 2018-12-14

## 2018-12-14 DIAGNOSIS — R06.00 EXERTIONAL DYSPNEA: ICD-10-CM

## 2018-12-14 LAB
CHEST PAIN STATEMENT: NORMAL
MAX DIASTOLIC BP: 68 MMHG
MAX HEART RATE: 164 BPM
MAX PREDICTED HEART RATE: 172 BPM
MAX. SYSTOLIC BP: 140 MMHG
PROTOCOL NAME: NORMAL
TARGET HR FORMULA: NORMAL
TIME IN EXERCISE PHASE: NORMAL

## 2018-12-14 PROCEDURE — 93018 CV STRESS TEST I&R ONLY: CPT | Performed by: INTERNAL MEDICINE

## 2018-12-14 PROCEDURE — 93017 CV STRESS TEST TRACING ONLY: CPT

## 2018-12-14 PROCEDURE — 93016 CV STRESS TEST SUPVJ ONLY: CPT | Performed by: INTERNAL MEDICINE

## 2018-12-14 PROCEDURE — 93306 TTE W/DOPPLER COMPLETE: CPT | Performed by: INTERNAL MEDICINE

## 2018-12-14 PROCEDURE — 93306 TTE W/DOPPLER COMPLETE: CPT

## 2018-12-14 NOTE — LETTER
December 14, 2018     Patient: Erna Salmon   YOB: 1970   Date of Visit: 12/14/2018       To Whom it May Concern:    Erna Salmon is under my professional care  He was seen at [place] [address] on 12/14/2018  He may return to work on 12/17/18  Excused for testing, appt 12/14/18  If you have any questions or concerns, please don't hesitate to call           Sincerely,          ELIESER Cook        CC: No Recipients

## 2019-02-08 ENCOUNTER — APPOINTMENT (OUTPATIENT)
Dept: RADIOLOGY | Facility: CLINIC | Age: 49
End: 2019-02-08
Attending: FAMILY MEDICINE
Payer: COMMERCIAL

## 2019-02-08 ENCOUNTER — OFFICE VISIT (OUTPATIENT)
Dept: URGENT CARE | Facility: CLINIC | Age: 49
End: 2019-02-08
Payer: COMMERCIAL

## 2019-02-08 VITALS
DIASTOLIC BLOOD PRESSURE: 70 MMHG | TEMPERATURE: 97.7 F | OXYGEN SATURATION: 98 % | BODY MASS INDEX: 21.09 KG/M2 | HEIGHT: 69 IN | HEART RATE: 80 BPM | RESPIRATION RATE: 16 BRPM | WEIGHT: 142.4 LBS | SYSTOLIC BLOOD PRESSURE: 114 MMHG

## 2019-02-08 DIAGNOSIS — R07.81 RIB PAIN ON LEFT SIDE: ICD-10-CM

## 2019-02-08 DIAGNOSIS — R07.81 RIB PAIN ON LEFT SIDE: Primary | ICD-10-CM

## 2019-02-08 PROCEDURE — 71101 X-RAY EXAM UNILAT RIBS/CHEST: CPT

## 2019-02-08 PROCEDURE — 99213 OFFICE O/P EST LOW 20 MIN: CPT | Performed by: FAMILY MEDICINE

## 2019-02-11 NOTE — PATIENT INSTRUCTIONS
Rib pain on the left side is likely related to a rib sprain  Xray shows no fracture or any other acute abnormality  I have instructed the patient to rest, apply a heating pad to the site, and take Tylenol or Motrin as needed for pain  I have instructed him to follow up w/ his PCP for further evaluation and treatment  If symptoms acutely worsen or any new symptoms present, should be seen in the ER

## 2019-02-11 NOTE — PROGRESS NOTES
[address]        NAME: Leonardo Burdick is a 50 y o  male  : 1970    MRN: [id number]  DATE: February 10, 2019  TIME: 8:58 PM    Assessment and Plan   Rib pain on left side [R07 81]  1  Rib pain on left side  XR ribs left w pa chest min 3 views         Patient Instructions   Patient Instructions   Rib pain on the left side is likely related to a rib sprain  Xray shows no fracture or any other acute abnormality  I have instructed the patient to rest, apply a heating pad to the site, and take Tylenol or Motrin as needed for pain  I have instructed him to follow up w/ his PCP for further evaluation and treatment  If symptoms acutely worsen or any new symptoms present, should be seen in the ER  Chief Complaint     Chief Complaint   Patient presents with    Rib Pain     Pt here for left rib pain,  pt states no injury,  pt states pain x2 weeks, area is tender to the touch  No meds used  History of Present Illness       49 yo male presents c/o left lateral rib pain for the past 2 weeks  He states the area is sore to touch  He denies falling or any known injury to the site, but does state he generally does a lot of manual work and heavy lifting as a part of his job as a   No swelling or bruising  No neck or back pain  No abdominal pain  No pain radiating down the legs  No chest pain or SOB  No cough/URI sx  No fever/chills  No hemoptysis  Non-smoker  He has not attempted any treatment for his symptoms  He has not been seen for this prior to today  Review of Systems   Review of Systems   Constitutional: Negative  Respiratory: Negative  Cardiovascular: Negative  Gastrointestinal: Negative  Genitourinary: Negative  Musculoskeletal:        As noted in HPI   Skin: Negative            Current Medications       Current Outpatient Medications:     traMADol (ULTRAM) 50 mg tablet, May take 1 tab as needed daily for multi-site arthritic pain, may repeat 1/2 tab in evening if needed  Max 1 5 tab daily, Disp: 45 tablet, Rfl: 2    Current Allergies     Allergies as of 02/08/2019 - Reviewed 02/08/2019   Allergen Reaction Noted    Ezetimibe Rash 08/23/2016    Levofloxacin Rash 11/08/2013    Penicillins Other (See Comments) 11/08/2013    Statins Myalgia 08/23/2016            The following portions of the patient's history were reviewed and updated as appropriate: allergies, current medications, past family history, past medical history, past social history, past surgical history and problem list      Past Medical History:   Diagnosis Date    Arthritis        Past Surgical History:   Procedure Laterality Date    HERNIA REPAIR      Inguinal hernia repair, left     SPLENECTOMY, PARTIAL         Family History   Problem Relation Age of Onset    Coronary artery disease Mother     Emphysema Mother         Emphysema/COPD    Skin cancer Mother     Emphysema Father         Emphysema/COPD    Heart attack Father         MI involving other coronary artery     Diabetes Paternal Uncle     Emphysema Family     Diabetes Family     Skin cancer Family     Nephrolithiasis Family          Medications have been verified  Objective   /70 (BP Location: Right arm, Patient Position: Sitting, Cuff Size: Standard)   Pulse 80   Temp 97 7 °F (36 5 °C) (Tympanic)   Resp 16   Ht 5' 9" (1 753 m)   Wt 64 6 kg (142 lb 6 4 oz)   SpO2 98%   BMI 21 03 kg/m²        Physical Exam     Physical Exam   Constitutional: He is oriented to person, place, and time  Vital signs are normal  He appears well-developed and well-nourished  He is active and cooperative  Non-toxic appearance  He does not have a sickly appearance  He does not appear ill  No distress  Cardiovascular: Normal rate, regular rhythm and normal heart sounds  Pulmonary/Chest: Effort normal and breath sounds normal  No respiratory distress  Abdominal: Soft  Bowel sounds are normal  He exhibits no distension and no mass  There is no hepatosplenomegaly  There is no tenderness  There is no rigidity, no rebound, no guarding and no CVA tenderness  Musculoskeletal:   No swelling, erythema, or bruising  There is tenderness to palpation of the left lateral lower rib region  No step offs palpated  No spinal or paraspinal tenderness  The abdomen is soft and non-distended  No tenderness to palpation  No LUQ tenderness or swelling noted  No CVA tenderness  Neurological: He is alert and oriented to person, place, and time  Skin: Skin is warm  No bruising, no ecchymosis and no rash noted  He is not diaphoretic  Nursing note and vitals reviewed

## 2019-02-27 ENCOUNTER — OFFICE VISIT (OUTPATIENT)
Dept: URGENT CARE | Facility: CLINIC | Age: 49
End: 2019-02-27
Payer: COMMERCIAL

## 2019-02-27 VITALS
SYSTOLIC BLOOD PRESSURE: 98 MMHG | TEMPERATURE: 98 F | HEART RATE: 82 BPM | DIASTOLIC BLOOD PRESSURE: 56 MMHG | BODY MASS INDEX: 20.23 KG/M2 | WEIGHT: 137 LBS | OXYGEN SATURATION: 100 % | RESPIRATION RATE: 16 BRPM

## 2019-02-27 DIAGNOSIS — S39.012A STRAIN OF MUSCLE, FASCIA AND TENDON OF LOWER BACK, INITIAL ENCOUNTER: Primary | ICD-10-CM

## 2019-02-27 PROCEDURE — 99213 OFFICE O/P EST LOW 20 MIN: CPT | Performed by: FAMILY MEDICINE

## 2019-02-27 RX ORDER — NAPROXEN 500 MG/1
500 TABLET ORAL 2 TIMES DAILY PRN
Qty: 20 TABLET | Refills: 0 | Status: SHIPPED | OUTPATIENT
Start: 2019-02-27 | End: 2019-08-26

## 2019-02-27 RX ORDER — CYCLOBENZAPRINE HCL 10 MG
10 TABLET ORAL
Qty: 10 TABLET | Refills: 0 | Status: SHIPPED | OUTPATIENT
Start: 2019-02-27 | End: 2019-08-26

## 2019-02-27 NOTE — PROGRESS NOTES
[address]        NAME: Rupinder Ding is a 50 y o  male  : 1970    MRN: [id number]  DATE: 2019  TIME: 1:26 PM    Assessment and Plan   Strain of muscle, fascia and tendon of lower back, initial encounter [S39 012A]  1  Strain of muscle, fascia and tendon of lower back, initial encounter  naproxen (NAPROSYN) 500 mg tablet    cyclobenzaprine (FLEXERIL) 10 mg tablet         Patient Instructions     Patient Instructions     1  Acute lumbar strain/spasm  - rest and apply a heating pad to the site   - take Naproxen as needed for pain   - take Flexeril as needed for muscle spasm, side effects discussed, not to be take prior to driving or operating machinery  - patient is to discontinue the Tramadol while taking the Naproxen and Flexeril, patient verbalized understanding and agreed  - may use a muscle rub such as BenGay or Icy Hot   - follow up w/ pcp for re-check in 3-5 days   - if symptoms persist despite treatment, worsen, or any new symptoms present, should be seen in the ER       Acute Low Back Pain   AMBULATORY CARE:   Acute low back pain  is sudden discomfort in your lower back area that lasts for up to 6 weeks  The discomfort makes it difficult to tolerate activity          Common symptoms include the following:   · Back stiffness or spasms    · Pain down the back or side of one leg    · Holding yourself in an unusual position or posture to decrease your back pain    · Not being able to find a sitting position that is comfortable    · Slow increase in your pain for 24 to 48 hours after you stress your back    · Tenderness on your lower back or severe pain when you move your back  Seek immediate care for the following symptoms:   · Severe pain    · Sudden stiffness and heaviness in both buttocks down to both legs    · Numbness or weakness in one leg, or pain in both legs    · Numbness in your genital area or across your lower back    · Unable to control your urine or bowel movements  Contact your healthcare provider if:   · You have a fever  · You have pain at night or when you rest     · Your pain does not get better with treatment  · You have pain that worsens when you cough or sneeze  · You suddenly feel something pop or snap in your back  · You have questions or concerns about your condition or care  The goal of treatment for acute low back pain  is to relieve your pain and help you tolerate activity  Most people with acute lower back pain get better within 4 to 6 weeks  You may need any of the following:  · Acetaminophen  decreases pain  It is available without a doctor's order  Ask how much to take and how often to take it  Follow directions  Acetaminophen can cause liver damage if not taken correctly  · NSAIDs  help decrease swelling and pain  This medicine is available with or without a doctor's order  NSAIDs can cause stomach bleeding or kidney problems in certain people  If you take blood thinner medicine, always ask your healthcare provider if NSAIDs are safe for you  Always read the medicine label and follow directions  · Prescription pain medicine  may be given  Ask your healthcare provider how to take this medicine safely  · Muscle relaxers  decrease pain by relaxing the muscles in your lower spine  Manage your symptoms:   · Stay active  as much as you can without causing more pain  Bed rest could make your back pain worse  Start with some light exercises such as walking  Avoid heavy lifting until your pain is gone  Ask for more information about the activities or exercises that are right for you  · Ice  helps decrease swelling, pain, and muscle spams  Put crushed ice in a plastic bag  Cover it with a towel  Place it on your lower back for 20 to 30 minutes every 2 hours  Do this for about 2 to 3 days after your pain starts, or as directed  · Heat  helps decrease pain and muscle spasms  Start to use heat after treatment with ice has stopped  Use a small towel dampened with warm water or a heating pad, or sit in a warm bath  Apply heat on the area for 20 to 30 minutes every 2 hours for as many days as directed  Alternate heat and ice  Prevent acute low back pain:   · Use proper body mechanics  ¨ Bend at the hips and knees when you  objects  Do not bend from the waist  Use your leg muscles as you lift the load  Do not use your back  Keep the object close to your chest as you lift it  Try not to twist or lift anything above your waist     ¨ Change your position often when you stand for long periods of time  Rest one foot on a small box or footrest, and then switch to the other foot often  ¨ Try not to sit for long periods of time  When you do, sit in a straight-backed chair with your feet flat on the floor  Never reach, pull, or push while you are sitting  · Do exercises that strengthen your back muscles  Warm up before you exercise  Ask your healthcare provider the best exercises for you  · Maintain a healthy weight  Ask your healthcare provider how much you should weigh  Ask him to help you create a weight loss plan if you are overweight  Follow up with your healthcare provider as directed:  Return for a follow-up visit if you still have pain after 1 to 3 weeks of treatment  You may need to visit an orthopedist if your back pain lasts more than 12 weeks  Write down your questions so you remember to ask them during your visits  © 2017 [facility] Information is for End User's use only and may not be sold, redistributed or otherwise used for commercial purposes  All illustrations and images included in CareNotes® are the copyrighted property of [employer] D [employer] M [employer]  or Art[employer] Govea  The above information is an  only  It is not intended as medical advice for individual conditions or treatments   Talk to your doctor, nurse or pharmacist before following any medical regimen to see if it is safe and effective for you  Follow up with PCP in 3-5 days  Proceed to  ER if symptoms worsen  Chief Complaint     Chief Complaint   Patient presents with    Back Pain     lower back pain r/t moving a heavy object on Sunday; On Monday bent over to pet dog and tightening of the lower back occured and pain set in         History of Present Illness       49 yo male presents c/o lower back pain  He states he was lifting a 5 gallon bucket of pennies 3 days ago, he did not feel any pain at the time  The next day he bent forward to pet his dog and at that time felt a pain and spasm in his lower back  Since then he has been experiencing low back pain and stiffness  No falls or direct trauma to the area  No swelling or bruising  No pain radiating down the legs  No numbness/tingling or weakness of the legs  No saddle anesthesia  No loss of bowel/bladder control  He has been applying ice and heat with no improvement  Review of Systems   Review of Systems   Constitutional: Negative  Respiratory: Negative  Cardiovascular: Negative  Gastrointestinal: Negative  Genitourinary: Negative  Musculoskeletal: Positive for back pain  As noted in HPI   Skin: Negative  Neurological: Negative  Current Medications       Current Outpatient Medications:     traMADol (ULTRAM) 50 mg tablet, May take 1 tab as needed daily for multi-site arthritic pain, may repeat 1/2 tab in evening if needed   Max 1 5 tab daily, Disp: 45 tablet, Rfl: 2    cyclobenzaprine (FLEXERIL) 10 mg tablet, Take 1 tablet (10 mg total) by mouth daily at bedtime as needed for muscle spasms for up to 5 days, Disp: 10 tablet, Rfl: 0    naproxen (NAPROSYN) 500 mg tablet, Take 1 tablet (500 mg total) by mouth 2 (two) times a day as needed for mild pain or moderate pain, Disp: 20 tablet, Rfl: 0    Current Allergies     Allergies as of 02/27/2019 - Reviewed 02/27/2019   Allergen Reaction Noted    Ezetimibe Rash 08/23/2016    Levofloxacin Rash 11/08/2013    Penicillins Other (See Comments) 11/08/2013    Statins Myalgia 08/23/2016            The following portions of the patient's history were reviewed and updated as appropriate: allergies, current medications, past family history, past medical history, past social history, past surgical history and problem list      Past Medical History:   Diagnosis Date    Arthritis        Past Surgical History:   Procedure Laterality Date    HERNIA REPAIR      Inguinal hernia repair, left     SPLENECTOMY, PARTIAL         Family History   Problem Relation Age of Onset    Coronary artery disease Mother     Emphysema Mother         Emphysema/COPD    Skin cancer Mother     Emphysema Father         Emphysema/COPD    Heart attack Father         MI involving other coronary artery     Diabetes Paternal Uncle     Emphysema Family     Diabetes Family     Skin cancer Family     Nephrolithiasis Family          Medications have been verified  Objective   BP 98/56   Pulse 82   Temp 98 °F (36 7 °C)   Resp 16   Wt 62 1 kg (137 lb)   SpO2 100%   BMI 20 23 kg/m²        Physical Exam     Physical Exam   Constitutional: He is oriented to person, place, and time  Vital signs are normal  He appears well-developed and well-nourished  He is active and cooperative  Non-toxic appearance  He does not have a sickly appearance  He does not appear ill  No distress  Cardiovascular: Normal rate, regular rhythm and normal heart sounds  Pulmonary/Chest: Effort normal and breath sounds normal  No respiratory distress  Musculoskeletal:   No swelling, erythema, or bruising  No spinal or paraspinal tenderness  Spine ROM limited in all planes due to pain  2+ DTRs  BL LE strength and sensations intact  Normal gait  Neurological: He is alert and oriented to person, place, and time  Skin: He is not diaphoretic  Psychiatric: He has a normal mood and affect   His behavior is normal  Judgment and thought content normal    Nursing note and vitals reviewed

## 2019-02-27 NOTE — LETTER
February 27, 2019     Patient: Richard Meier   YOB: 1970   Date of Visit: 2/27/2019       To Whom it May Concern:    Richard Meier was seen in my clinic on 2/27/2019  If you have any questions or concerns, please don't hesitate to call           Sincerely,          Marlee Zuniga MD

## 2019-02-27 NOTE — PATIENT INSTRUCTIONS
1  Acute lumbar strain/spasm  - rest and apply a heating pad to the site   - take Naproxen as needed for pain   - take Flexeril as needed for muscle spasm, side effects discussed, not to be take prior to driving or operating machinery  - patient is to discontinue the Tramadol while taking the Naproxen and Flexeril, patient verbalized understanding and agreed  - may use a muscle rub such as BenGay or Icy Hot   - follow up w/ pcp for re-check in 3-5 days   - if symptoms persist despite treatment, worsen, or any new symptoms present, should be seen in the ER       Acute Low Back Pain   AMBULATORY CARE:   Acute low back pain  is sudden discomfort in your lower back area that lasts for up to 6 weeks  The discomfort makes it difficult to tolerate activity  Common symptoms include the following:   · Back stiffness or spasms    · Pain down the back or side of one leg    · Holding yourself in an unusual position or posture to decrease your back pain    · Not being able to find a sitting position that is comfortable    · Slow increase in your pain for 24 to 48 hours after you stress your back    · Tenderness on your lower back or severe pain when you move your back  Seek immediate care for the following symptoms:   · Severe pain    · Sudden stiffness and heaviness in both buttocks down to both legs    · Numbness or weakness in one leg, or pain in both legs    · Numbness in your genital area or across your lower back    · Unable to control your urine or bowel movements  Contact your healthcare provider if:   · You have a fever  · You have pain at night or when you rest     · Your pain does not get better with treatment  · You have pain that worsens when you cough or sneeze  · You suddenly feel something pop or snap in your back  · You have questions or concerns about your condition or care  The goal of treatment for acute low back pain  is to relieve your pain and help you tolerate activity   Most people with acute lower back pain get better within 4 to 6 weeks  You may need any of the following:  · Acetaminophen  decreases pain  It is available without a doctor's order  Ask how much to take and how often to take it  Follow directions  Acetaminophen can cause liver damage if not taken correctly  · NSAIDs  help decrease swelling and pain  This medicine is available with or without a doctor's order  NSAIDs can cause stomach bleeding or kidney problems in certain people  If you take blood thinner medicine, always ask your healthcare provider if NSAIDs are safe for you  Always read the medicine label and follow directions  · Prescription pain medicine  may be given  Ask your healthcare provider how to take this medicine safely  · Muscle relaxers  decrease pain by relaxing the muscles in your lower spine  Manage your symptoms:   · Stay active  as much as you can without causing more pain  Bed rest could make your back pain worse  Start with some light exercises such as walking  Avoid heavy lifting until your pain is gone  Ask for more information about the activities or exercises that are right for you  · Ice  helps decrease swelling, pain, and muscle spams  Put crushed ice in a plastic bag  Cover it with a towel  Place it on your lower back for 20 to 30 minutes every 2 hours  Do this for about 2 to 3 days after your pain starts, or as directed  · Heat  helps decrease pain and muscle spasms  Start to use heat after treatment with ice has stopped  Use a small towel dampened with warm water or a heating pad, or sit in a warm bath  Apply heat on the area for 20 to 30 minutes every 2 hours for as many days as directed  Alternate heat and ice  Prevent acute low back pain:   · Use proper body mechanics  ¨ Bend at the hips and knees when you  objects  Do not bend from the waist  Use your leg muscles as you lift the load  Do not use your back  Keep the object close to your chest as you lift it   Try not to twist or lift anything above your waist     ¨ Change your position often when you stand for long periods of time  Rest one foot on a small box or footrest, and then switch to the other foot often  ¨ Try not to sit for long periods of time  When you do, sit in a straight-backed chair with your feet flat on the floor  Never reach, pull, or push while you are sitting  · Do exercises that strengthen your back muscles  Warm up before you exercise  Ask your healthcare provider the best exercises for you  · Maintain a healthy weight  Ask your healthcare provider how much you should weigh  Ask him to help you create a weight loss plan if you are overweight  Follow up with your healthcare provider as directed:  Return for a follow-up visit if you still have pain after 1 to 3 weeks of treatment  You may need to visit an orthopedist if your back pain lasts more than 12 weeks  Write down your questions so you remember to ask them during your visits  © 2017 [facility] Information is for End User's use only and may not be sold, redistributed or otherwise used for commercial purposes  All illustrations and images included in CareNotes® are the copyrighted property of [employer]  or Artur Govea  The above information is an  only  It is not intended as medical advice for individual conditions or treatments  Talk to your doctor, nurse or pharmacist before following any medical regimen to see if it is safe and effective for you

## 2019-03-13 DIAGNOSIS — M54.50 CHRONIC BILATERAL LOW BACK PAIN WITHOUT SCIATICA: ICD-10-CM

## 2019-03-13 DIAGNOSIS — G89.29 CHRONIC BILATERAL LOW BACK PAIN WITHOUT SCIATICA: ICD-10-CM

## 2019-03-14 RX ORDER — TRAMADOL HYDROCHLORIDE 50 MG/1
TABLET ORAL
Qty: 45 TABLET | Refills: 2 | Status: SHIPPED | OUTPATIENT
Start: 2019-03-14 | End: 2019-06-27 | Stop reason: SDUPTHER

## 2019-06-27 DIAGNOSIS — G89.29 CHRONIC BILATERAL LOW BACK PAIN WITHOUT SCIATICA: ICD-10-CM

## 2019-06-27 DIAGNOSIS — M54.50 CHRONIC BILATERAL LOW BACK PAIN WITHOUT SCIATICA: ICD-10-CM

## 2019-06-27 RX ORDER — TRAMADOL HYDROCHLORIDE 50 MG/1
TABLET ORAL
Qty: 45 TABLET | Refills: 2 | Status: SHIPPED | OUTPATIENT
Start: 2019-06-27 | End: 2019-08-26 | Stop reason: SDUPTHER

## 2019-08-26 ENCOUNTER — OFFICE VISIT (OUTPATIENT)
Dept: FAMILY MEDICINE CLINIC | Facility: CLINIC | Age: 49
End: 2019-08-26
Payer: COMMERCIAL

## 2019-08-26 VITALS
SYSTOLIC BLOOD PRESSURE: 120 MMHG | HEART RATE: 66 BPM | DIASTOLIC BLOOD PRESSURE: 80 MMHG | BODY MASS INDEX: 20.17 KG/M2 | RESPIRATION RATE: 16 BRPM | HEIGHT: 69 IN | WEIGHT: 136.2 LBS | TEMPERATURE: 98 F

## 2019-08-26 DIAGNOSIS — R10.32 LEFT INGUINAL PAIN: Primary | ICD-10-CM

## 2019-08-26 DIAGNOSIS — M54.50 CHRONIC BILATERAL LOW BACK PAIN WITHOUT SCIATICA: ICD-10-CM

## 2019-08-26 DIAGNOSIS — G89.29 CHRONIC BILATERAL LOW BACK PAIN WITHOUT SCIATICA: ICD-10-CM

## 2019-08-26 PROCEDURE — 1036F TOBACCO NON-USER: CPT | Performed by: NURSE PRACTITIONER

## 2019-08-26 PROCEDURE — 99213 OFFICE O/P EST LOW 20 MIN: CPT | Performed by: NURSE PRACTITIONER

## 2019-08-26 PROCEDURE — 3008F BODY MASS INDEX DOCD: CPT | Performed by: NURSE PRACTITIONER

## 2019-08-26 RX ORDER — TRAMADOL HYDROCHLORIDE 50 MG/1
50 TABLET ORAL EVERY 12 HOURS PRN
Qty: 60 TABLET | Refills: 1 | Status: SHIPPED | OUTPATIENT
Start: 2019-08-26 | End: 2019-11-12 | Stop reason: SDUPTHER

## 2019-08-26 NOTE — PROGRESS NOTES
Assessment/Plan:    Problem List Items Addressed This Visit     None      Visit Diagnoses     Left inguinal pain    -  Primary    s/p left inguinal hernia repair    Relevant Orders    US groin/inguinal area    Ambulatory referral to General Surgery    Chronic bilateral low back pain without sciatica        Relevant Medications    traMADol (ULTRAM) 50 mg tablet        The case discussed with patient using patient centered shared decision making  The patient was counseled regarding instructions for management,-- risk factor reductions,-- prognosis,-- impressions,-- risks and benefits of treatment options,-- importance of compliance with treatment  I have reviewed the instructions with the patient, answering all questions to his satisfaction  Patient clinically stable today  Given that the patient reports overall reduced pain and improved level functioning without significant side effects, I felt a reasonable to continue the patient on current agent and dosing schedule as needed for pain  Continue current treatment plan  Reviewed   Review Appropriate  Patient is not receiving medications from other  Providers  No physical or psychological signs of dependence  Patient compliant with our agreement  Again discussed the risks of developing physical/psychological dependence of the controlled substance  Discussed risks, dangers of addiction overdose  Discussed dangers of concurrent use of pain medications with alcohol, benzos and other depressants  Side effects include but are not limited to nausea, vomiting, GI intolerance, sedation, constipation, mental clouding, opioid-induced hyperalgesia, endocrine dysfunction, addiction, dependence, and tolerance  The patient was asked to take his medications only as prescribed and directed, never in excess, and never for any other reason other than for pain control   The patient was also asked to keep his medications out of the reach of others and away from children, preferably in a locked drawer  The patient verbalized understanding and wished to use these opioid medications  Patient is advised that he/she must comply with prescribed treatment plans related to current  disorder including counseling, follow-up, use of adjunctive non pharmacologic approaches  If he/she does not comply with prescribed treatment plans or violate controlled substance agreement he may be subject to dismissal from this practice  He/ she verbalizes understanding and agreement of the above  No follow-ups on file  Subjective:      Patient ID: Digna Patel is a 50 y o  male  Chief Complaint   Patient presents with    Pain     pt c/o of hernia pain        Groin Pain   Primary symptoms comment: left inguinal pain  has been getting progressively worse  s/p hernia repair 2012  noticed no bulge  This is a chronic problem  The current episode started more than 1 year ago  The problem has been gradually worsening  The pain is medium  Pertinent negatives include no abdominal pain, constipation, diarrhea, discolored urine, dysuria, fever, hematuria, nausea, painful intercourse, urgency or urinary retention  The symptoms are aggravated by heavy lifting  He has tried nothing for the symptoms  Needs refill of tramadol     The following portions of the patient's history were reviewed and updated as appropriate: allergies, current medications, past family history, past medical history, past social history, past surgical history and problem list     Review of Systems   Constitutional: Negative for fever  Respiratory: Negative  Cardiovascular: Negative  Gastrointestinal: Negative for abdominal pain, constipation, diarrhea and nausea  Genitourinary: Negative for dysuria and urgency  Per hpi   Musculoskeletal: Positive for back pain  Neurological: Negative            Current Outpatient Medications   Medication Sig Dispense Refill    traMADol (ULTRAM) 50 mg tablet Take 1 tablet (50 mg total) by mouth every 12 (twelve) hours as needed for moderate pain 60 tablet 1     No current facility-administered medications for this visit  Objective:    /80 (BP Location: Left arm, Patient Position: Sitting, Cuff Size: Standard)   Pulse 66   Temp 98 °F (36 7 °C)   Resp 16   Ht 5' 9" (1 753 m)   Wt 61 8 kg (136 lb 3 2 oz)   BMI 20 11 kg/m²        Physical Exam   Constitutional: He is oriented to person, place, and time  Vital signs are normal  He appears well-developed and well-nourished  No distress  Cardiovascular: Normal rate, regular rhythm, normal heart sounds and intact distal pulses  Pulmonary/Chest: Effort normal and breath sounds normal    Abdominal: Hernia confirmed negative in the left inguinal area  Genitourinary:         Neurological: He is alert and oriented to person, place, and time  Skin: Skin is warm and dry  Capillary refill takes less than 2 seconds  No rash noted  No pallor  Nursing note and vitals reviewed               Mane Deleon, [address]

## 2019-08-28 ENCOUNTER — HOSPITAL ENCOUNTER (OUTPATIENT)
Dept: RADIOLOGY | Facility: HOSPITAL | Age: 49
Discharge: HOME | End: 2019-08-28
Payer: COMMERCIAL

## 2019-08-28 DIAGNOSIS — R10.32 LEFT INGUINAL PAIN: ICD-10-CM

## 2019-08-28 PROCEDURE — 76705 ECHO EXAM OF ABDOMEN: CPT

## 2019-09-03 ENCOUNTER — TELEPHONE (OUTPATIENT)
Dept: FAMILY MEDICINE CLINIC | Facility: CLINIC | Age: 49
End: 2019-09-03

## 2019-09-03 ENCOUNTER — CONSULT (OUTPATIENT)
Dept: SURGERY | Facility: CLINIC | Age: 49
End: 2019-09-03
Payer: COMMERCIAL

## 2019-09-03 VITALS
SYSTOLIC BLOOD PRESSURE: 110 MMHG | HEART RATE: 55 BPM | DIASTOLIC BLOOD PRESSURE: 60 MMHG | WEIGHT: 136.6 LBS | HEIGHT: 69 IN | BODY MASS INDEX: 20.23 KG/M2 | TEMPERATURE: 96.8 F

## 2019-09-03 DIAGNOSIS — R59.0 INGUINAL LYMPHADENOPATHY: ICD-10-CM

## 2019-09-03 DIAGNOSIS — R10.32 LEFT INGUINAL PAIN: ICD-10-CM

## 2019-09-03 DIAGNOSIS — R10.32 LEFT GROIN PAIN: Primary | ICD-10-CM

## 2019-09-03 DIAGNOSIS — R59.0 AXILLARY LYMPHADENOPATHY: ICD-10-CM

## 2019-09-03 PROCEDURE — 99244 OFF/OP CNSLTJ NEW/EST MOD 40: CPT | Performed by: SPECIALIST

## 2019-09-03 NOTE — ASSESSMENT & PLAN NOTE
On examination minimal bilateral bulge were note but definite cough impulse noted    Small inguinal lymph nodes

## 2019-09-11 ENCOUNTER — HOSPITAL ENCOUNTER (OUTPATIENT)
Dept: RADIOLOGY | Facility: HOSPITAL | Age: 49
Discharge: HOME | End: 2019-09-11
Attending: SPECIALIST
Payer: COMMERCIAL

## 2019-09-11 DIAGNOSIS — R59.0 INGUINAL LYMPHADENOPATHY: ICD-10-CM

## 2019-09-11 DIAGNOSIS — R59.0 AXILLARY LYMPHADENOPATHY: ICD-10-CM

## 2019-09-11 DIAGNOSIS — R10.32 LEFT GROIN PAIN: ICD-10-CM

## 2019-09-11 DIAGNOSIS — R10.32 LEFT INGUINAL PAIN: ICD-10-CM

## 2019-09-11 LAB
BASOPHILS # BLD AUTO: 0 X10E3/UL (ref 0–0.2)
BASOPHILS NFR BLD AUTO: 0 %
EOSINOPHIL # BLD AUTO: 0.1 X10E3/UL (ref 0–0.4)
EOSINOPHIL NFR BLD AUTO: 1 %
ERYTHROCYTE [DISTWIDTH] IN BLOOD BY AUTOMATED COUNT: 13.4 % (ref 12.3–15.4)
ERYTHROCYTE [SEDIMENTATION RATE] IN BLOOD BY WESTERGREN METHOD: 2 MM/HR (ref 0–15)
HCT VFR BLD AUTO: 41 % (ref 37.5–51)
HGB BLD-MCNC: 14.4 G/DL (ref 13–17.7)
IMM GRANULOCYTES # BLD: 0 X10E3/UL (ref 0–0.1)
IMM GRANULOCYTES NFR BLD: 0 %
LYMPHOCYTES # BLD AUTO: 2.7 X10E3/UL (ref 0.7–3.1)
LYMPHOCYTES NFR BLD AUTO: 38 %
MCH RBC QN AUTO: 30.9 PG (ref 26.6–33)
MCHC RBC AUTO-ENTMCNC: 35.1 G/DL (ref 31.5–35.7)
MCV RBC AUTO: 88 FL (ref 79–97)
MONOCYTES # BLD AUTO: 0.7 X10E3/UL (ref 0.1–0.9)
MONOCYTES NFR BLD AUTO: 9 %
NEUTROPHILS # BLD AUTO: 3.6 X10E3/UL (ref 1.4–7)
NEUTROPHILS NFR BLD AUTO: 52 %
PLATELET # BLD AUTO: 253 X10E3/UL (ref 150–450)
RBC # BLD AUTO: 4.66 X10E6/UL (ref 4.14–5.8)
WBC # BLD AUTO: 7.1 X10E3/UL (ref 3.4–10.8)

## 2019-09-11 PROCEDURE — 74177 CT ABD & PELVIS W/CONTRAST: CPT

## 2019-09-11 RX ADMIN — IOHEXOL 100 ML: 350 INJECTION, SOLUTION INTRAVENOUS at 11:01

## 2019-09-24 ENCOUNTER — OFFICE VISIT (OUTPATIENT)
Dept: SURGERY | Facility: CLINIC | Age: 49
End: 2019-09-24
Payer: COMMERCIAL

## 2019-09-24 VITALS
HEIGHT: 69 IN | TEMPERATURE: 96.1 F | SYSTOLIC BLOOD PRESSURE: 102 MMHG | WEIGHT: 139.6 LBS | DIASTOLIC BLOOD PRESSURE: 70 MMHG | BODY MASS INDEX: 20.68 KG/M2

## 2019-09-24 DIAGNOSIS — R10.32 LEFT INGUINAL PAIN: ICD-10-CM

## 2019-09-24 DIAGNOSIS — Z48.89 ENCOUNTER FOR SURGICAL FOLLOW-UP CARE: Primary | ICD-10-CM

## 2019-09-24 PROCEDURE — 99213 OFFICE O/P EST LOW 20 MIN: CPT | Performed by: SPECIALIST

## 2019-09-24 NOTE — PROGRESS NOTES
[facility] office follow-up Note:    Rashi Connell 50 y o  male MRN: [id number]  Encounter: [id number] PCP: ELIESER Donald      Assessment/Plan:    Encounter for surgical follow-up care  groin lymphadenopathy has now improved which was there on ultrasound       Diagnoses and all orders for this visit:    Encounter for surgical follow-up care    Left inguinal pain        The left groin pain is completely improved    Plan  The kidney stone to follow urology right kidney stone without obstruction hydronephrosis  Left groin pain is completely resolved status post hernia    CT scan reviewed with patient no abnormality was noted inguinal lymphadenopathy has resolved no abdominal lymphadenopathy  Discharge home follow-up  Subjective:      Patient ID: Rashi Connell is a 50 y o  male  Mild left groin pain is completely resolved  Has some right testicular pain which may be possibly secondary to my kidney stone I have kidney stones in past    I am here to review CT scan which was done last      The following portions of the patient's history were reviewed and updated as appropriate: He  has a past medical history of Arthritis    He   Patient Active Problem List    Diagnosis Date Noted    Encounter for surgical follow-up care 09/24/2019    Left inguinal pain 09/03/2019    Inguinal lymphadenopathy 09/03/2019    Axillary lymphadenopathy 09/03/2019    Strain of muscle, fascia and tendon of lower back, initial encounter 02/27/2019    Former tobacco use 11/26/2018    Erectile dysfunction of organic origin 01/09/2018    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    Dyslipidemia 09/01/2017    Nephrolithiasis 11/08/2013     He  has a past surgical history that includes Hernia repair and Splenectomy, partial   His family history includes Coronary artery disease in his mother; Diabetes in his family and paternal uncle; Emphysema in his family, father, and mother; Heart attack in his father; Nephrolithiasis in his family; Skin cancer in his family and mother  He  reports that he has quit smoking  He has a 10 00 pack-year smoking history  He has quit using smokeless tobacco  He reports that he does not drink alcohol or use drugs  Current Outpatient Medications   Medication Sig Dispense Refill    traMADol (ULTRAM) 50 mg tablet Take 1 tablet (50 mg total) by mouth every 12 (twelve) hours as needed for moderate pain 60 tablet 1     No current facility-administered medications for this visit  Current Outpatient Medications on File Prior to Visit   Medication Sig    traMADol (ULTRAM) 50 mg tablet Take 1 tablet (50 mg total) by mouth every 12 (twelve) hours as needed for moderate pain     No current facility-administered medications on file prior to visit  He is allergic to ezetimibe; levofloxacin; penicillins; and statins       Review of Systems   Constitutional: Negative for activity change and appetite change  HENT: Negative for congestion and dental problem  Eyes: Negative for discharge and itching  Respiratory: Negative for apnea and chest tightness  Cardiovascular: Negative for chest pain and leg swelling  Gastrointestinal: Negative for abdominal distention and abdominal pain  Endocrine: Negative for cold intolerance  Genitourinary: Negative for difficulty urinating  Allergic/Immunologic: Negative for environmental allergies  Neurological: Negative for dizziness  Hematological: Positive for adenopathy  Psychiatric/Behavioral: Negative for agitation and behavioral problems  Objective:    /70   Temp (!) 96 1 °F (35 6 °C)   Ht 5' 9" (1 753 m)   Wt 63 3 kg (139 lb 9 6 oz)   BMI 20 62 kg/m²      Physical Exam   Constitutional: He is oriented to person, place, and time  He appears well-developed and well-nourished  HENT:   Head: Normocephalic and atraumatic  Eyes: Pupils are equal, round, and reactive to light   Conjunctivae and EOM are normal    Neck: Normal range of motion  Neck supple  No JVD present  No tracheal deviation present  No thyromegaly present  Cardiovascular: Normal rate, regular rhythm, normal heart sounds and intact distal pulses  Pulmonary/Chest: Effort normal and breath sounds normal    Abdominal: Soft  Bowel sounds are normal    Musculoskeletal: Normal range of motion  Lymphadenopathy:     He has no cervical adenopathy  Neurological: He is alert and oriented to person, place, and time  Skin: Skin is warm and dry  Psychiatric: His behavior is normal    Nursing note and vitals reviewed  Pertinent labs reviewed  Most Recent Labs:   No visits with results within 2 Week(s) from this visit     Latest known visit with results is:   Orders Only on 09/10/2019   Component Date Value Ref Range Status    White Blood Cell Count 09/10/2019 7 1  3 4 - 10 8 x10E3/uL Final    Red Blood Cell Count 09/10/2019 4 66  4 14 - 5 80 x10E6/uL Final    Hemoglobin 09/10/2019 14 4  13 0 - 17 7 g/dL Final    HCT 09/10/2019 41 0  37 5 - 51 0 % Final    MCV 09/10/2019 88  79 - 97 fL Final    MCH 09/10/2019 30 9  26 6 - 33 0 pg Final    MCHC 09/10/2019 35 1  31 5 - 35 7 g/dL Final    RDW 09/10/2019 13 4  12 3 - 15 4 % Final    Platelet Count 77/16/9196 253  150 - 450 x10E3/uL Final    Neutrophils 09/10/2019 52  Not Estab  % Final    Lymphocytes 09/10/2019 38  Not Estab  % Final    Monocytes 09/10/2019 9  Not Estab  % Final    Eosinophils 09/10/2019 1  Not Estab  % Final    Basophils PCT 09/10/2019 0  Not Estab  % Final    Neutrophils (Absolute) 09/10/2019 3 6  1 4 - 7 0 x10E3/uL Final    Lymphocytes (Absolute) 09/10/2019 2 7  0 7 - 3 1 x10E3/uL Final    Monocytes (Absolute) 09/10/2019 0 7  0 1 - 0 9 x10E3/uL Final    Eosinophils (Absolute) 09/10/2019 0 1  0 0 - 0 4 x10E3/uL Final    Basophils ABS 09/10/2019 0 0  0 0 - 0 2 x10E3/uL Final    Immature Granulocytes 09/10/2019 0  Not Estab  % Final    Immature Granulocytes (Absolute) 09/10/2019 0 0  0 0 - 0 1 x10E3/uL Final    Sedimentation Rate 09/10/2019 2  0 - 15 mm/hr Final       Pertinent images and available reads personally reviewed  Procedure: Us Groin/inguinal Area    Result Date: 9/3/2019  Narrative: LEFT INGUINAL ULTRASOUND INDICATION:   R10 32: Left lower quadrant pain  COMPARISON:  None TECHNIQUE:   Real-time ultrasound of the left inguinal region was performed with a transducer with both volumetric sweeps and still imaging techniques  FINDINGS:  Several normal-appearing lymph nodes are visualized  No fluid collections or hernia demonstrated  Impression: No acute findings  No hernia or fluid collection visualized  Workstation performed: [id number]     Procedure: Ct Abdomen Pelvis W Contrast    Result Date: 9/11/2019  Narrative: CT ABDOMEN AND PELVIS WITH IV CONTRAST INDICATION:   R10 32: Left lower quadrant pain R10 32: Left lower quadrant pain R59 0: Localized enlarged lymph nodes R59 0: Localized enlarged lymph nodes  Dr Perez Right note note from 9/3/2019 was reviewed  Patient has left groin pain  On physical exam, patient has bilateral axillary and inguinal lymphadenopathies  Patient also has remote history of hernia surgery in 2012  COMPARISON:  Left groin ultrasound from 8/28/2019  Abdomen and pelvic CT from 2/25/2012  Abdomen and pelvic CT from 10/17/2010  TECHNIQUE:  CT examination of the abdomen and pelvis was performed  Axial, sagittal, and coronal 2D reformatted images were created from the source data and submitted for interpretation  Radiation dose length product (DLP) for this visit:  445 62 mGy-cm   This examination, like all CT scans performed in the [facility], was performed utilizing techniques to minimize radiation dose exposure, including the use of iterative  reconstruction and automated exposure control  IV Contrast:  100 mL of iohexol (OMNIPAQUE) Enteric Contrast:  Enteric contrast was administered   FINDINGS: ABDOMEN LOWER CHEST:  No clinically significant abnormality identified in the visualized lower chest  LIVER/BILIARY TREE:  One or more subcentimeter sharply circumscribed low-density hepatic lesion(s) are noted, too small to accurately characterize, but statistically most likely to represent subcentimeter hepatic cysts  No suspicious solid hepatic lesion is identified  Hepatic contours are normal   No biliary dilatation  GALLBLADDER:  No calcified gallstones  No pericholecystic inflammatory change  SPLEEN:  Unremarkable  PANCREAS:  Unremarkable  ADRENAL GLANDS:  Unremarkable  KIDNEYS/URETERS:  There is a 6 mm right renal lower pole calyceal stone (series 2 image 28 )  No hydronephrosis  There is a tiny simple cysts in the left kidney midpole  Kidneys and ureters are otherwise unremarkable  STOMACH AND BOWEL:  Unremarkable  APPENDIX:  A normal appendix was visualized  ABDOMINOPELVIC CAVITY:  No ascites or free intraperitoneal air  No lymphadenopathy  VESSELS:  Unremarkable for patient's age  PELVIS REPRODUCTIVE ORGANS:  Unremarkable for patient's age  URINARY BLADDER:  Unremarkable  ABDOMINAL WALL/INGUINAL REGIONS:  There are expected postoperative changes from left inguinal hernia repair  There is no evidence of recurrent inguinal hernia  There is no evidence of inguinal adenopathy  OSSEOUS STRUCTURES:  No acute fracture or destructive osseous lesion  Impression: There is no evidence of inguinal adenopathy, or adenopathy elsewhere in the abdomen and pelvis  There is a 6 mm nonobstructing right renal lower pole calyceal stone (series 2 image 28 )  No hydronephrosis  Workstation performed: [id number]       Pertinent notes reviewed      ?   Dr Joseph Mast MD  [address] [place] [address] [facility]  [phone]

## 2019-09-26 ENCOUNTER — HOSPITAL ENCOUNTER (OUTPATIENT)
Dept: RADIOLOGY | Facility: HOSPITAL | Age: 49
Discharge: HOME | End: 2019-09-26
Attending: UROLOGY
Payer: COMMERCIAL

## 2019-09-26 ENCOUNTER — TRANSCRIBE ORDERS (OUTPATIENT)
Dept: ADMINISTRATIVE | Facility: HOSPITAL | Age: 49
End: 2019-09-26

## 2019-09-26 DIAGNOSIS — N20.0 KIDNEY STONE: Primary | ICD-10-CM

## 2019-09-26 PROCEDURE — 74018 RADEX ABDOMEN 1 VIEW: CPT

## 2019-11-04 ENCOUNTER — CLINICAL SUPPORT (OUTPATIENT)
Dept: URGENT CARE | Facility: CLINIC | Age: 49
End: 2019-11-04
Payer: COMMERCIAL

## 2019-11-04 DIAGNOSIS — Z23 NEED FOR IMMUNIZATION AGAINST INFLUENZA: Primary | ICD-10-CM

## 2019-11-04 PROCEDURE — 90471 IMMUNIZATION ADMIN: CPT

## 2019-11-04 PROCEDURE — 90686 IIV4 VACC NO PRSV 0.5 ML IM: CPT

## 2019-11-05 NOTE — H&P
History & Physical - [facility]  Louis [place] 52 y o  male MRN: [id number]  Unit/Bed#:  Encounter: [id number]    ASSESSMENT/PLAN:    Renal stone  Hx of Ca Ox stones  KUB 09/26/19 indicated 5mm right lower pole stone  Desire for ESWL to possibly avoid ureteroscopy and stent  · Right ESWL  · The risks, benefits, alternatives,and probabilities of success were discussed in detail with no guarantee made as to outcome  All questions were answered to the patient's satisfaction  HISTORY OF PRESENT ILLNESS:  53 y/o male with a hx of Ca Ox renal stone presents for ESWL treatment of his 5mm right lower pole stone  PAST MEDICAL HISTORY:  Past Medical History:   Diagnosis Date    Arthritis        PAST SURGICAL HISTORY:  Past Surgical History:   Procedure Laterality Date    HERNIA REPAIR      Inguinal hernia repair, left     SPLENECTOMY, PARTIAL         ALLERGIES:  Allergies   Allergen Reactions    Ezetimibe Rash    Levofloxacin Rash    Penicillins Rash     Unsure of reaction    Statins Myalgia       SOCIAL HISTORY:  Social History     Substance and Sexual Activity   Alcohol Use No     Social History     Substance and Sexual Activity   Drug Use No     Social History     Tobacco Use   Smoking Status Former Smoker    Packs/day: 0 50    Years: 20 00    Pack years: 10 00   Smokeless Tobacco Former User   Tobacco Comment    chew       FAMILY HISTORY:  Family History   Problem Relation Age of Onset    Coronary artery disease Mother     Emphysema Mother         Emphysema/COPD    Skin cancer Mother     Emphysema Father         Emphysema/COPD    Heart attack Father         MI involving other coronary artery     Diabetes Paternal Uncle     Emphysema Family     Diabetes Family     Skin cancer Family     Nephrolithiasis Family        MEDICATIONS:  No current facility-administered medications for this encounter       Current Outpatient Medications:     traMADol (ULTRAM) 50 mg tablet, Take 1 tablet (50 mg total) by mouth every 12 (twelve) hours as needed for moderate pain, Disp: 60 tablet, Rfl: 1    Review of Systems   Respiratory: Positive for cough  PHYSICAL EXAM:  Physical Exam   Constitutional: He appears well-developed  HENT:   Head: Normocephalic  Neck: Normal range of motion  Cardiovascular: Normal rate  Pulmonary/Chest: Effort normal    Abdominal: Soft  Neurological: He is alert  Skin: Skin is warm  Psychiatric: He has a normal mood and affect  LAB RESULTS:  Lab Results   Component Value Date    WBC 7 1 09/10/2019    HGB 14 4 09/10/2019    HCT 41 0 09/10/2019    MCV 88 09/10/2019     09/10/2019     Lab Results   Component Value Date    SODIUM 143 11/26/2018    K 5 5 (H) 11/26/2018     11/26/2018    CO2 26 11/26/2018    BUN 9 11/26/2018    CREATININE 0 87 11/26/2018    GLUC 94 11/26/2018    CALCIUM 9 2 09/01/2017     Lab Results   Component Value Date    CALCIUM 9 2 09/01/2017     Lab Results   Component Value Date    PSA 1 3 11/26/2018       OTHER STUDIES:  KUB 09/26/19 - 5mm right lower pole stone    Jennifer Yates PA-C  11/05/19    Portions of the record may have been created with voice recognition software   Occasional wrong word or "sound alike" substitutions may have occurred due to the inherent limitations of voice recognition software   Read the chart carefully and recognize, using context, where substitutions have occurred

## 2019-11-12 DIAGNOSIS — M54.50 CHRONIC BILATERAL LOW BACK PAIN WITHOUT SCIATICA: ICD-10-CM

## 2019-11-12 DIAGNOSIS — G89.29 CHRONIC BILATERAL LOW BACK PAIN WITHOUT SCIATICA: ICD-10-CM

## 2019-11-12 RX ORDER — TRAMADOL HYDROCHLORIDE 50 MG/1
TABLET ORAL
Qty: 60 TABLET | Refills: 0 | Status: SHIPPED | OUTPATIENT
Start: 2019-11-12 | End: 2019-12-23 | Stop reason: SDUPTHER

## 2019-12-04 ENCOUNTER — APPOINTMENT (OUTPATIENT)
Dept: LAB | Facility: HOSPITAL | Age: 49
End: 2019-12-04
Attending: UROLOGY
Payer: COMMERCIAL

## 2019-12-04 ENCOUNTER — APPOINTMENT (OUTPATIENT)
Dept: PREADMISSION TESTING | Facility: HOSPITAL | Age: 49
End: 2019-12-04
Payer: COMMERCIAL

## 2019-12-04 ENCOUNTER — TRANSCRIBE ORDERS (OUTPATIENT)
Dept: ADMINISTRATIVE | Facility: HOSPITAL | Age: 49
End: 2019-12-04

## 2019-12-04 ENCOUNTER — OFFICE VISIT (OUTPATIENT)
Dept: LAB | Facility: HOSPITAL | Age: 49
End: 2019-12-04
Attending: UROLOGY
Payer: COMMERCIAL

## 2019-12-04 DIAGNOSIS — Z01.818 PREOP TESTING: ICD-10-CM

## 2019-12-04 DIAGNOSIS — Z01.818 PREOP TESTING: Primary | ICD-10-CM

## 2019-12-04 LAB
ANION GAP SERPL CALCULATED.3IONS-SCNC: 3 MMOL/L (ref 4–13)
BASOPHILS # BLD AUTO: 0.02 THOUSANDS/ΜL (ref 0–0.1)
BASOPHILS NFR BLD AUTO: 0 % (ref 0–1)
BILIRUB UR QL STRIP: NEGATIVE
BUN SERPL-MCNC: 12 MG/DL (ref 5–25)
CALCIUM SERPL-MCNC: 9.3 MG/DL (ref 8.3–10.1)
CHLORIDE SERPL-SCNC: 108 MMOL/L (ref 100–108)
CLARITY UR: CLEAR
CO2 SERPL-SCNC: 30 MMOL/L (ref 21–32)
COLOR UR: YELLOW
CREAT SERPL-MCNC: 0.8 MG/DL (ref 0.6–1.3)
EOSINOPHIL # BLD AUTO: 0.07 THOUSAND/ΜL (ref 0–0.61)
EOSINOPHIL NFR BLD AUTO: 1 % (ref 0–6)
ERYTHROCYTE [DISTWIDTH] IN BLOOD BY AUTOMATED COUNT: 12.7 % (ref 11.6–15.1)
GFR SERPL CREATININE-BSD FRML MDRD: 105 ML/MIN/1.73SQ M
GLUCOSE P FAST SERPL-MCNC: 111 MG/DL (ref 65–99)
GLUCOSE UR STRIP-MCNC: NEGATIVE MG/DL
HCT VFR BLD AUTO: 44.3 % (ref 36.5–49.3)
HGB BLD-MCNC: 14.6 G/DL (ref 12–17)
HGB UR QL STRIP.AUTO: NEGATIVE
IMM GRANULOCYTES # BLD AUTO: 0.02 THOUSAND/UL (ref 0–0.2)
IMM GRANULOCYTES NFR BLD AUTO: 0 % (ref 0–2)
KETONES UR STRIP-MCNC: NEGATIVE MG/DL
LEUKOCYTE ESTERASE UR QL STRIP: NEGATIVE
LYMPHOCYTES # BLD AUTO: 1.95 THOUSANDS/ΜL (ref 0.6–4.47)
LYMPHOCYTES NFR BLD AUTO: 27 % (ref 14–44)
MCH RBC QN AUTO: 30.6 PG (ref 26.8–34.3)
MCHC RBC AUTO-ENTMCNC: 33 G/DL (ref 31.4–37.4)
MCV RBC AUTO: 93 FL (ref 82–98)
MONOCYTES # BLD AUTO: 0.67 THOUSAND/ΜL (ref 0.17–1.22)
MONOCYTES NFR BLD AUTO: 9 % (ref 4–12)
NEUTROPHILS # BLD AUTO: 4.49 THOUSANDS/ΜL (ref 1.85–7.62)
NEUTS SEG NFR BLD AUTO: 63 % (ref 43–75)
NITRITE UR QL STRIP: NEGATIVE
NRBC BLD AUTO-RTO: 0 /100 WBCS
PH UR STRIP.AUTO: 8 [PH]
PLATELET # BLD AUTO: 275 THOUSANDS/UL (ref 149–390)
PMV BLD AUTO: 9.9 FL (ref 8.9–12.7)
POTASSIUM SERPL-SCNC: 4.8 MMOL/L (ref 3.5–5.3)
PROT UR STRIP-MCNC: NEGATIVE MG/DL
RBC # BLD AUTO: 4.77 MILLION/UL (ref 3.88–5.62)
SODIUM SERPL-SCNC: 141 MMOL/L (ref 136–145)
SP GR UR STRIP.AUTO: 1.01 (ref 1–1.03)
UROBILINOGEN UR QL STRIP.AUTO: 0.2 E.U./DL
WBC # BLD AUTO: 7.22 THOUSAND/UL (ref 4.31–10.16)

## 2019-12-04 PROCEDURE — 80048 BASIC METABOLIC PNL TOTAL CA: CPT

## 2019-12-04 PROCEDURE — 85025 COMPLETE CBC W/AUTO DIFF WBC: CPT | Performed by: UROLOGY

## 2019-12-04 PROCEDURE — 81003 URINALYSIS AUTO W/O SCOPE: CPT | Performed by: UROLOGY

## 2019-12-04 PROCEDURE — 87086 URINE CULTURE/COLONY COUNT: CPT

## 2019-12-04 PROCEDURE — 93005 ELECTROCARDIOGRAM TRACING: CPT

## 2019-12-04 PROCEDURE — 36415 COLL VENOUS BLD VENIPUNCTURE: CPT | Performed by: UROLOGY

## 2019-12-04 NOTE — PRE-PROCEDURE INSTRUCTIONS
My Surgical Experience    The following information was developed to assist you to prepare for your operation  What do I need to do before coming to the hospital?   Arrange for a responsible person to drive you to and from the hospital    Arrange care for your children at home  Children are not allowed in the recovery areas of the hospital   Plan to wear clothing that is easy to put on and take off  If you are having shoulder surgery, wear a shirt that buttons or zippers in the front  Bathing  o Shower the evening before and the morning of your surgery with an antibacterial soap  Please refer to the Pre Op Showering Instructions for Surgery Patients Sheet   o Remove nail polish and all body piercing jewelry  o Do not shave any body part for at least 24 hours before surgery-this includes face, arms, legs and upper body  Food  o Nothing to eat or drink after midnight the night before your surgery  This includes candy and chewing gum  o Exception: If your surgery is after 12:00pm (noon), you may have clear liquids such as 7-Up®, ginger ale, apple or cranberry juice, Jell-O®, water, or clear broth until 8:00 am  o Do not drink milk or juice with pulp on the morning before surgery  o Do not drink alcohol 24 hours before surgery  Medicine  o Follow instructions you received from your surgeon about which medicines you may take on the day of surgery  o If instructed to take medicine on the morning of surgery, take pills with just a small sip of water  Call your prescribing doctor for specific infroamtion on what to do if you take insulin    What should I bring to the hospital?    Bring:  [employer] or a walker, if you have them, for foot or knee surgery   A list of the daily medicines, vitamins, minerals, herbals and nutritional supplements you take   Include the dosages of medicines and the time you take them each day   Glasses, dentures or hearing aids   Minimal clothing; you will be wearing hospital sleepwear   Photo ID; required to verify your identity   If you have a Living Will or Power of , bring a copy of the documents   If you have an ostomy, bring an extra pouch and any supplies you use    Do not bring   Medicines or inhalers   Money, valuables or jewelry    What other information should I know about the day of surgery?  Notify your surgeons if you develop a cold, sore throat, cough, fever, rash or any other illness   Report to the Ambulatory Surgical/Same Day Surgery Unit   You will be instructed to stop at Registration only if you have not been pre-registered   Inform your  fi they do not stay that they will be asked by the staff to leave a phone number where they can be reached   Be available to be reached before surgery  In the event the operating room schedule changes, you may be asked to come in earlier or later than expected    *It is important to tell your doctor and others involved in your health care if you are taking or have been taking any non-prescription drugs, vitamins, minerals, herbals or other nutritional supplements  Any of these may interact with some food or medicines and cause a reaction      Pre-Surgery Instructions:   Medication Instructions    traMADol (ULTRAM) 50 mg tablet Instructed patient per Anesthesia Guidelines

## 2019-12-05 LAB — BACTERIA UR CULT: NORMAL

## 2019-12-07 LAB
ATRIAL RATE: 58 BPM
P AXIS: 68 DEGREES
PR INTERVAL: 174 MS
QRS AXIS: 51 DEGREES
QRSD INTERVAL: 90 MS
QT INTERVAL: 400 MS
QTC INTERVAL: 392 MS
T WAVE AXIS: 63 DEGREES
VENTRICULAR RATE: 58 BPM

## 2019-12-07 PROCEDURE — 93010 ELECTROCARDIOGRAM REPORT: CPT | Performed by: INTERNAL MEDICINE

## 2019-12-11 ENCOUNTER — HOSPITAL ENCOUNTER (OUTPATIENT)
Facility: AMBULARY SURGERY CENTER | Age: 49
Setting detail: OUTPATIENT SURGERY
Discharge: HOME | End: 2019-12-11
Attending: UROLOGY | Admitting: UROLOGY
Payer: COMMERCIAL

## 2019-12-11 ENCOUNTER — ANESTHESIA (OUTPATIENT)
Dept: PERIOP | Facility: AMBULARY SURGERY CENTER | Age: 49
End: 2019-12-11
Payer: COMMERCIAL

## 2019-12-11 ENCOUNTER — APPOINTMENT (EMERGENCY)
Dept: RADIOLOGY | Facility: HOSPITAL | Age: 49
End: 2019-12-11
Payer: COMMERCIAL

## 2019-12-11 ENCOUNTER — HOSPITAL ENCOUNTER (OUTPATIENT)
Facility: HOSPITAL | Age: 49
Setting detail: OBSERVATION
Discharge: HOME | End: 2019-12-12
Attending: EMERGENCY MEDICINE | Admitting: INTERNAL MEDICINE
Payer: COMMERCIAL

## 2019-12-11 ENCOUNTER — ANESTHESIA EVENT (OUTPATIENT)
Dept: PERIOP | Facility: AMBULARY SURGERY CENTER | Age: 49
End: 2019-12-11
Payer: COMMERCIAL

## 2019-12-11 VITALS
OXYGEN SATURATION: 100 % | HEIGHT: 70 IN | BODY MASS INDEX: 20.04 KG/M2 | TEMPERATURE: 96.4 F | RESPIRATION RATE: 20 BRPM | DIASTOLIC BLOOD PRESSURE: 96 MMHG | WEIGHT: 140 LBS | HEART RATE: 46 BPM | SYSTOLIC BLOOD PRESSURE: 154 MMHG

## 2019-12-11 DIAGNOSIS — R11.2 NAUSEA & VOMITING: Primary | ICD-10-CM

## 2019-12-11 PROBLEM — M19.90 ARTHRITIS: Status: ACTIVE | Noted: 2019-12-11

## 2019-12-11 LAB
ALBUMIN SERPL BCP-MCNC: 4.2 G/DL (ref 3.5–5)
ALP SERPL-CCNC: 37 U/L (ref 46–116)
ALT SERPL W P-5'-P-CCNC: 29 U/L (ref 12–78)
ANION GAP SERPL CALCULATED.3IONS-SCNC: 13 MMOL/L (ref 4–13)
AST SERPL W P-5'-P-CCNC: 24 U/L (ref 5–45)
BASOPHILS # BLD AUTO: 0.01 THOUSANDS/ΜL (ref 0–0.1)
BASOPHILS NFR BLD AUTO: 0 % (ref 0–1)
BILIRUB SERPL-MCNC: 1 MG/DL (ref 0.2–1)
BUN SERPL-MCNC: 13 MG/DL (ref 5–25)
CALCIUM SERPL-MCNC: 9.2 MG/DL (ref 8.3–10.1)
CHLORIDE SERPL-SCNC: 100 MMOL/L (ref 100–108)
CO2 SERPL-SCNC: 24 MMOL/L (ref 21–32)
CREAT SERPL-MCNC: 1.07 MG/DL (ref 0.6–1.3)
EOSINOPHIL # BLD AUTO: 0 THOUSAND/ΜL (ref 0–0.61)
EOSINOPHIL NFR BLD AUTO: 0 % (ref 0–6)
ERYTHROCYTE [DISTWIDTH] IN BLOOD BY AUTOMATED COUNT: 12.5 % (ref 11.6–15.1)
GFR SERPL CREATININE-BSD FRML MDRD: 81 ML/MIN/1.73SQ M
GLUCOSE SERPL-MCNC: 147 MG/DL (ref 65–140)
HCT VFR BLD AUTO: 40.8 % (ref 36.5–49.3)
HGB BLD-MCNC: 14.1 G/DL (ref 12–17)
IMM GRANULOCYTES # BLD AUTO: 0.04 THOUSAND/UL (ref 0–0.2)
IMM GRANULOCYTES NFR BLD AUTO: 0 % (ref 0–2)
LIPASE SERPL-CCNC: 82 U/L (ref 73–393)
LYMPHOCYTES # BLD AUTO: 0.98 THOUSANDS/ΜL (ref 0.6–4.47)
LYMPHOCYTES NFR BLD AUTO: 9 % (ref 14–44)
MCH RBC QN AUTO: 31.1 PG (ref 26.8–34.3)
MCHC RBC AUTO-ENTMCNC: 34.6 G/DL (ref 31.4–37.4)
MCV RBC AUTO: 90 FL (ref 82–98)
MONOCYTES # BLD AUTO: 0.43 THOUSAND/ΜL (ref 0.17–1.22)
MONOCYTES NFR BLD AUTO: 4 % (ref 4–12)
NEUTROPHILS # BLD AUTO: 10.11 THOUSANDS/ΜL (ref 1.85–7.62)
NEUTS SEG NFR BLD AUTO: 87 % (ref 43–75)
NRBC BLD AUTO-RTO: 0 /100 WBCS
PLATELET # BLD AUTO: 265 THOUSANDS/UL (ref 149–390)
PMV BLD AUTO: 9.9 FL (ref 8.9–12.7)
POTASSIUM SERPL-SCNC: 4.1 MMOL/L (ref 3.5–5.3)
PROT SERPL-MCNC: 7 G/DL (ref 6.4–8.2)
RBC # BLD AUTO: 4.54 MILLION/UL (ref 3.88–5.62)
SODIUM SERPL-SCNC: 137 MMOL/L (ref 136–145)
WBC # BLD AUTO: 11.57 THOUSAND/UL (ref 4.31–10.16)

## 2019-12-11 PROCEDURE — 85025 COMPLETE CBC W/AUTO DIFF WBC: CPT | Performed by: EMERGENCY MEDICINE

## 2019-12-11 PROCEDURE — 96375 TX/PRO/DX INJ NEW DRUG ADDON: CPT

## 2019-12-11 PROCEDURE — 99219 PR INITIAL OBSERVATION CARE/DAY 50 MINUTES: CPT | Performed by: NURSE PRACTITIONER

## 2019-12-11 PROCEDURE — 99285 EMERGENCY DEPT VISIT HI MDM: CPT

## 2019-12-11 PROCEDURE — 96361 HYDRATE IV INFUSION ADD-ON: CPT

## 2019-12-11 PROCEDURE — 80053 COMPREHEN METABOLIC PANEL: CPT | Performed by: EMERGENCY MEDICINE

## 2019-12-11 PROCEDURE — 83690 ASSAY OF LIPASE: CPT | Performed by: EMERGENCY MEDICINE

## 2019-12-11 PROCEDURE — 96374 THER/PROPH/DIAG INJ IV PUSH: CPT

## 2019-12-11 PROCEDURE — 36415 COLL VENOUS BLD VENIPUNCTURE: CPT | Performed by: EMERGENCY MEDICINE

## 2019-12-11 PROCEDURE — 74176 CT ABD & PELVIS W/O CONTRAST: CPT

## 2019-12-11 RX ORDER — ONDANSETRON 2 MG/ML
4 INJECTION INTRAMUSCULAR; INTRAVENOUS ONCE AS NEEDED
Status: DISCONTINUED | OUTPATIENT
Start: 2019-12-11 | End: 2019-12-11 | Stop reason: HOSPADM

## 2019-12-11 RX ORDER — SODIUM CHLORIDE, SODIUM LACTATE, POTASSIUM CHLORIDE, CALCIUM CHLORIDE 600; 310; 30; 20 MG/100ML; MG/100ML; MG/100ML; MG/100ML
125 INJECTION, SOLUTION INTRAVENOUS CONTINUOUS
Status: DISCONTINUED | OUTPATIENT
Start: 2019-12-11 | End: 2019-12-11 | Stop reason: HOSPADM

## 2019-12-11 RX ORDER — DEXAMETHASONE SODIUM PHOSPHATE 4 MG/ML
INJECTION, SOLUTION INTRA-ARTICULAR; INTRALESIONAL; INTRAMUSCULAR; INTRAVENOUS; SOFT TISSUE AS NEEDED
Status: DISCONTINUED | OUTPATIENT
Start: 2019-12-11 | End: 2019-12-11 | Stop reason: SURG

## 2019-12-11 RX ORDER — ONDANSETRON 2 MG/ML
4 INJECTION INTRAMUSCULAR; INTRAVENOUS ONCE
Status: COMPLETED | OUTPATIENT
Start: 2019-12-11 | End: 2019-12-11

## 2019-12-11 RX ORDER — LIDOCAINE HYDROCHLORIDE 10 MG/ML
INJECTION, SOLUTION EPIDURAL; INFILTRATION; INTRACAUDAL; PERINEURAL AS NEEDED
Status: DISCONTINUED | OUTPATIENT
Start: 2019-12-11 | End: 2019-12-11 | Stop reason: SURG

## 2019-12-11 RX ORDER — FENTANYL CITRATE 50 UG/ML
INJECTION, SOLUTION INTRAMUSCULAR; INTRAVENOUS AS NEEDED
Status: DISCONTINUED | OUTPATIENT
Start: 2019-12-11 | End: 2019-12-11 | Stop reason: SURG

## 2019-12-11 RX ORDER — ACETAMINOPHEN AND CODEINE PHOSPHATE 300; 30 MG/1; MG/1
1 TABLET ORAL EVERY 4 HOURS PRN
COMMUNITY
End: 2020-02-14

## 2019-12-11 RX ORDER — PROPOFOL 10 MG/ML
INJECTION, EMULSION INTRAVENOUS AS NEEDED
Status: DISCONTINUED | OUTPATIENT
Start: 2019-12-11 | End: 2019-12-11 | Stop reason: SURG

## 2019-12-11 RX ORDER — ONDANSETRON 2 MG/ML
INJECTION INTRAMUSCULAR; INTRAVENOUS AS NEEDED
Status: DISCONTINUED | OUTPATIENT
Start: 2019-12-11 | End: 2019-12-11 | Stop reason: SURG

## 2019-12-11 RX ORDER — ONDANSETRON 2 MG/ML
4 INJECTION INTRAMUSCULAR; INTRAVENOUS EVERY 6 HOURS PRN
Status: DISCONTINUED | OUTPATIENT
Start: 2019-12-11 | End: 2019-12-12 | Stop reason: HOSPADM

## 2019-12-11 RX ORDER — MORPHINE SULFATE 4 MG/ML
4 INJECTION, SOLUTION INTRAMUSCULAR; INTRAVENOUS ONCE
Status: COMPLETED | OUTPATIENT
Start: 2019-12-11 | End: 2019-12-11

## 2019-12-11 RX ORDER — METOCLOPRAMIDE HYDROCHLORIDE 5 MG/ML
10 INJECTION INTRAMUSCULAR; INTRAVENOUS ONCE AS NEEDED
Status: DISCONTINUED | OUTPATIENT
Start: 2019-12-11 | End: 2019-12-11 | Stop reason: HOSPADM

## 2019-12-11 RX ORDER — METOCLOPRAMIDE HYDROCHLORIDE 5 MG/ML
10 INJECTION INTRAMUSCULAR; INTRAVENOUS ONCE
Status: COMPLETED | OUTPATIENT
Start: 2019-12-11 | End: 2019-12-11

## 2019-12-11 RX ORDER — MIDAZOLAM HYDROCHLORIDE 2 MG/2ML
INJECTION, SOLUTION INTRAMUSCULAR; INTRAVENOUS AS NEEDED
Status: DISCONTINUED | OUTPATIENT
Start: 2019-12-11 | End: 2019-12-11 | Stop reason: SURG

## 2019-12-11 RX ORDER — SODIUM CHLORIDE 9 MG/ML
125 INJECTION, SOLUTION INTRAVENOUS CONTINUOUS
Status: DISCONTINUED | OUTPATIENT
Start: 2019-12-11 | End: 2019-12-12 | Stop reason: HOSPADM

## 2019-12-11 RX ORDER — FENTANYL CITRATE/PF 50 MCG/ML
25 SYRINGE (ML) INJECTION
Status: DISCONTINUED | OUTPATIENT
Start: 2019-12-11 | End: 2019-12-11 | Stop reason: HOSPADM

## 2019-12-11 RX ADMIN — SODIUM CHLORIDE 500 ML: 0.9 INJECTION, SOLUTION INTRAVENOUS at 19:37

## 2019-12-11 RX ADMIN — FENTANYL CITRATE 25 MCG: 50 INJECTION, SOLUTION INTRAMUSCULAR; INTRAVENOUS at 12:58

## 2019-12-11 RX ADMIN — FENTANYL CITRATE 50 MCG: 50 INJECTION, SOLUTION INTRAMUSCULAR; INTRAVENOUS at 13:01

## 2019-12-11 RX ADMIN — SODIUM CHLORIDE 125 ML/HR: 0.9 INJECTION, SOLUTION INTRAVENOUS at 22:07

## 2019-12-11 RX ADMIN — ONDANSETRON 4 MG: 2 INJECTION INTRAMUSCULAR; INTRAVENOUS at 22:07

## 2019-12-11 RX ADMIN — ONDANSETRON 4 MG: 2 INJECTION INTRAMUSCULAR; INTRAVENOUS at 12:53

## 2019-12-11 RX ADMIN — SODIUM CHLORIDE 1000 ML: 0.9 INJECTION, SOLUTION INTRAVENOUS at 19:15

## 2019-12-11 RX ADMIN — FENTANYL CITRATE 25 MCG: 50 INJECTION, SOLUTION INTRAMUSCULAR; INTRAVENOUS at 13:02

## 2019-12-11 RX ADMIN — FENTANYL CITRATE 25 MCG: 50 INJECTION, SOLUTION INTRAMUSCULAR; INTRAVENOUS at 13:38

## 2019-12-11 RX ADMIN — LIDOCAINE HYDROCHLORIDE 50 MG: 10 INJECTION, SOLUTION EPIDURAL; INFILTRATION; INTRACAUDAL; PERINEURAL at 12:40

## 2019-12-11 RX ADMIN — ONDANSETRON 4 MG: 2 INJECTION INTRAMUSCULAR; INTRAVENOUS at 19:16

## 2019-12-11 RX ADMIN — MORPHINE SULFATE 4 MG: 4 INJECTION INTRAVENOUS at 19:47

## 2019-12-11 RX ADMIN — ONDANSETRON 4 MG: 2 INJECTION INTRAMUSCULAR; INTRAVENOUS at 19:35

## 2019-12-11 RX ADMIN — METOCLOPRAMIDE 10 MG: 5 INJECTION, SOLUTION INTRAMUSCULAR; INTRAVENOUS at 21:11

## 2019-12-11 RX ADMIN — SODIUM CHLORIDE, SODIUM LACTATE, POTASSIUM CHLORIDE, AND CALCIUM CHLORIDE 125 ML/HR: .6; .31; .03; .02 INJECTION, SOLUTION INTRAVENOUS at 12:00

## 2019-12-11 RX ADMIN — FENTANYL CITRATE 25 MCG: 50 INJECTION, SOLUTION INTRAMUSCULAR; INTRAVENOUS at 13:51

## 2019-12-11 RX ADMIN — MORPHINE SULFATE 2 MG: 2 INJECTION, SOLUTION INTRAMUSCULAR; INTRAVENOUS at 22:07

## 2019-12-11 RX ADMIN — DEXAMETHASONE SODIUM PHOSPHATE 4 MG: 4 INJECTION, SOLUTION INTRA-ARTICULAR; INTRALESIONAL; INTRAMUSCULAR; INTRAVENOUS; SOFT TISSUE at 12:53

## 2019-12-11 RX ADMIN — MIDAZOLAM HYDROCHLORIDE 2 MG: 1 INJECTION, SOLUTION INTRAMUSCULAR; INTRAVENOUS at 12:32

## 2019-12-11 RX ADMIN — PROPOFOL 200 MG: 10 INJECTION, EMULSION INTRAVENOUS at 12:40

## 2019-12-11 NOTE — OP NOTE
OPERATIVE REPORT- Dr Martinez Paulino  PATIENT NAME: Eilene Lesch    :  1970  MRN: [id number]  Pt Location: [facility] OR ROOM 02    SURGERY DATE: 2019    Surgeon: Sanchez Jolly MD    Pre-op Diagnosis:  1  Right renal stone, 5mm    Post-op Diagnosis:  1  same    Procedure:  1  Right ESWL    Specimen(s): None    Estimated Blood Loss: Unknown    Complications: None    Drains: none    Anesthesia type: General    Indications for surgery:  1    5mm right renal calculus    Findings:  1  5mm right renal calculus    Procedure and Technique:   After obtaining consent and identifying the patient, antibiotics were given as ordered and the patient was brought to the room  All appropriate leads and monitors were placed and the patient was appropriately positioned on the table  Yady Lacey was identified using biplanar fluoroscopy  Anesthesia was administered and the treatment head brought into position  A timeout was performed where the patient name, , procedure including side of stone, antibiotics, allergies, etc  were discussed  All in the room were satisfied before the start of the operative procedure  What follows are the operative findings and events  Treatment was gradually increased to a maximum of 7 in energy  The patient had 2500 shocks  Treatment resulted in excellent pulverization  Tolerating the treatment well, the patient was awakened and transferred to the PACU in satisfactory condition  Plan:   Needs outpatient follow-up 2 to 3 weeks with an xray prior  Slip provided  SIGNATURE: Sanchez Jolly MD  DATE: 2019  TIME: 1:15 PM    Portions of the record may have been created with voice recognition software   Occasional wrong word or "sound alike" substitutions may have occurred due to the inherent limitations of voice recognition software   Read the chart carefully and recognize, using context, where substitutions have occurred

## 2019-12-11 NOTE — ANESTHESIA PREPROCEDURE EVALUATION
Review of Systems/Medical History  Patient summary reviewed  Chart reviewed  No history of anesthetic complications     Cardiovascular  Negative cardio ROS Exercise tolerance (METS): >4,  Hyperlipidemia,    Pulmonary  Negative pulmonary ROS Not a smoker ,        GI/Hepatic  Negative GI/hepatic ROS          Kidney stones,        Endo/Other  Negative endo/other ROS      GYN  Negative gynecology ROS          Hematology  Negative hematology ROS      Musculoskeletal    Arthritis     Neurology  Negative neurology ROS      Psychology   Negative psychology ROS              Physical Exam    Airway    Mallampati score: I  TM Distance: >3 FB  Neck ROM: full     Dental   No notable dental hx     Cardiovascular  Comment: Negative ROS, Rhythm: regular, Rate: normal, Cardiovascular exam normal    Pulmonary  Pulmonary exam normal Breath sounds clear to auscultation,     Other Findings        Anesthesia Plan  ASA Score- 2     Anesthesia Type- general with ASA Monitors  Additional Monitors:   Airway Plan: LMA  Plan Factors-  Patient did not smoke on day of surgery  Induction- intravenous  Postoperative Plan- Plan for postoperative opioid use  Informed Consent- Anesthetic plan and risks discussed with patient  I personally reviewed this patient with the CRNA  Discussed and agreed on the Anesthesia Plan with the CRNA  Ruel Farah

## 2019-12-11 NOTE — ANESTHESIA POSTPROCEDURE EVALUATION
Post-Op Assessment Note    CV Status:  Stable  Pain Score: 3    Pain management: adequate     Mental Status:  Alert and awake   Hydration Status:  Euvolemic   PONV Controlled:  Controlled   Airway Patency:  Patent   Post Op Vitals Reviewed: Yes      Staff: Anesthesiologist           /96 (12/11/19 1328)    Temp     Pulse (!) 46 (12/11/19 1328)   Resp 20 (12/11/19 1328)    SpO2 100 % (12/11/19 1322)

## 2019-12-12 ENCOUNTER — TRANSITIONAL CARE MANAGEMENT (OUTPATIENT)
Dept: FAMILY MEDICINE CLINIC | Facility: CLINIC | Age: 49
End: 2019-12-12

## 2019-12-12 VITALS
HEIGHT: 70 IN | RESPIRATION RATE: 15 BRPM | DIASTOLIC BLOOD PRESSURE: 70 MMHG | BODY MASS INDEX: 19.79 KG/M2 | HEART RATE: 61 BPM | WEIGHT: 138.23 LBS | SYSTOLIC BLOOD PRESSURE: 128 MMHG | TEMPERATURE: 99 F | OXYGEN SATURATION: 98 %

## 2019-12-12 PROBLEM — T81.40XA POST OP INFECTION: Status: ACTIVE | Noted: 2019-12-12

## 2019-12-12 PROBLEM — R11.2 NAUSEA & VOMITING: Status: RESOLVED | Noted: 2019-12-11 | Resolved: 2019-12-12

## 2019-12-12 LAB
ANION GAP SERPL CALCULATED.3IONS-SCNC: 7 MMOL/L (ref 4–13)
BACTERIA UR QL AUTO: ABNORMAL /HPF
BILIRUB UR QL STRIP: NEGATIVE
BUN SERPL-MCNC: 11 MG/DL (ref 5–25)
CALCIUM SERPL-MCNC: 7.6 MG/DL (ref 8.3–10.1)
CHLORIDE SERPL-SCNC: 107 MMOL/L (ref 100–108)
CLARITY UR: CLEAR
CO2 SERPL-SCNC: 26 MMOL/L (ref 21–32)
COLOR UR: YELLOW
CREAT SERPL-MCNC: 0.82 MG/DL (ref 0.6–1.3)
ERYTHROCYTE [DISTWIDTH] IN BLOOD BY AUTOMATED COUNT: 12.8 % (ref 11.6–15.1)
GFR SERPL CREATININE-BSD FRML MDRD: 104 ML/MIN/1.73SQ M
GLUCOSE SERPL-MCNC: 127 MG/DL (ref 65–140)
GLUCOSE UR STRIP-MCNC: NEGATIVE MG/DL
HCT VFR BLD AUTO: 36.2 % (ref 36.5–49.3)
HGB BLD-MCNC: 12.3 G/DL (ref 12–17)
HGB UR QL STRIP.AUTO: ABNORMAL
KETONES UR STRIP-MCNC: ABNORMAL MG/DL
LEUKOCYTE ESTERASE UR QL STRIP: NEGATIVE
MCH RBC QN AUTO: 31.6 PG (ref 26.8–34.3)
MCHC RBC AUTO-ENTMCNC: 34 G/DL (ref 31.4–37.4)
MCV RBC AUTO: 93 FL (ref 82–98)
NITRITE UR QL STRIP: NEGATIVE
NON-SQ EPI CELLS URNS QL MICRO: ABNORMAL /HPF
PH UR STRIP.AUTO: 5.5 [PH]
PLATELET # BLD AUTO: 225 THOUSANDS/UL (ref 149–390)
PMV BLD AUTO: 10.4 FL (ref 8.9–12.7)
POTASSIUM SERPL-SCNC: 3.9 MMOL/L (ref 3.5–5.3)
PROT UR STRIP-MCNC: ABNORMAL MG/DL
RBC # BLD AUTO: 3.89 MILLION/UL (ref 3.88–5.62)
RBC #/AREA URNS AUTO: ABNORMAL /HPF
SODIUM SERPL-SCNC: 140 MMOL/L (ref 136–145)
SP GR UR STRIP.AUTO: 1.01 (ref 1–1.03)
UROBILINOGEN UR QL STRIP.AUTO: 0.2 E.U./DL
WBC # BLD AUTO: 9.81 THOUSAND/UL (ref 4.31–10.16)
WBC #/AREA URNS AUTO: ABNORMAL /HPF

## 2019-12-12 PROCEDURE — 99217 PR OBSERVATION CARE DISCHARGE MANAGEMENT: CPT | Performed by: STUDENT IN AN ORGANIZED HEALTH CARE EDUCATION/TRAINING PROGRAM

## 2019-12-12 PROCEDURE — 81001 URINALYSIS AUTO W/SCOPE: CPT | Performed by: NURSE PRACTITIONER

## 2019-12-12 PROCEDURE — 85027 COMPLETE CBC AUTOMATED: CPT | Performed by: NURSE PRACTITIONER

## 2019-12-12 PROCEDURE — 80048 BASIC METABOLIC PNL TOTAL CA: CPT | Performed by: NURSE PRACTITIONER

## 2019-12-12 RX ORDER — ONDANSETRON 8 MG/1
8 TABLET, ORALLY DISINTEGRATING ORAL EVERY 8 HOURS PRN
Qty: 20 TABLET | Refills: 0 | Status: SHIPPED | OUTPATIENT
Start: 2019-12-12 | End: 2020-02-14

## 2019-12-12 RX ADMIN — ONDANSETRON 4 MG: 2 INJECTION INTRAMUSCULAR; INTRAVENOUS at 09:49

## 2019-12-12 NOTE — NURSING NOTE
Pt left via walking with a steady gait accompanied by his wife  IV dc and intact  Discharge instructions reviewed with pt  Prescription called to pt's pharmacy  Up to date with immunizations  Non-smoker  No further questions at this time

## 2019-12-12 NOTE — DISCHARGE SUMMARY
Discharge- Kassidy Pastrana 1970, 52 y o  male MRN: [id number]    Unit/Bed#: 2 [place] [address] Encounter: [id number]    Primary Care Provider: Scot Hernandez   Date and time admitted to hospital: 12/11/2019  7:01 PM        * Nausea & vomiting-resolved as of 12/12/2019  Assessment & Plan  · Patient s/p elective lithotripsy for right renal stone on the same day of admission , and post procedure had rigth flank pain, N/V and chills  · In the ED Vitals normal   Labs with a WBC of 11 57     · CT of the A/P with a nonobstructing right renal calculi  · He was admitted to observation  · He was treated with IVF, antiemetics   · CBC normalized, he had one time temp of 100 5 but this also resolved  · He tolerated diet and was discharged home  · Symptoms more likely post op side effects       Arthritis  Assessment & Plan  Takes tramadol as an outpatient for arthritis in his thumb          Discharging Physician / Practitioner: Choco Graham MD  PCP: Scot Hernandez  Admission Date: 12/11/2019  Discharge Date: 12/12/19    Reason for Admission: Vomiting (Patient states he had lithotripsy done today and has had vomiting since then denies pain)        Resolved Problems  Date Reviewed: 12/11/2019          Resolved    * (Principal) Nausea & vomiting 12/12/2019     Resolved by  Choco Graham MD          Consultations During Hospital Stay:  None    Procedures Performed:     ·     Significant Findings / Test Results:     ·   Results from last 7 days   Lab Units 12/12/19  0535 12/11/19  1916   WBC Thousand/uL 9 81 11 57*   HEMOGLOBIN g/dL 12 3 14 1   PLATELETS Thousands/uL 225 265     Results from last 7 days   Lab Units 12/12/19  0535 12/11/19  1916   SODIUM mmol/L 140 137   POTASSIUM mmol/L 3 9 4 1   CHLORIDE mmol/L 107 100   CO2 mmol/L 26 24   BUN mg/dL 11 13   CREATININE mg/dL 0 82 1 07   CALCIUM mg/dL 7 6* 9 2   TOTAL BILIRUBIN mg/dL  --  1 00   ALK PHOS U/L  --  37*   ALT U/L  --  29   AST U/L  --  24             No results found for: HGBA1C          Blood Culture: No results found for: BLOODCX  Urine Culture:   Lab Results   Component Value Date    URINECX No Growth <1000 cfu/mL 12/04/2019     Sputum Culture: No components found for: SPUTUMCX  Wound Culture: No results found for: WOUNDCULT     CT abdomen pelvis wo contrast   Final Result by Romy Black MD (12/11 2038)         1  Nonobstructing right renal calculi measuring up to 3 mm  No evidence of recent passage of a calculus  2   No evidence of bowel obstruction, colitis or diverticulitis  Normal appendix  Workstation performed: [id number]                Incidental Findings:   ·      Test Results Pending at Discharge (will require follow up):   ·      Outpatient Tests Requested:  ·     Complications:  none    Reason for Admission:   Chief Complaint   Patient presents with    Vomiting     Patient states he had lithotripsy done today and has had vomiting since then denies pain       Hospital Course:     Per HPI: Rowan Salmon is a 52 y o  male patient with a PMH of arthritis who presents with nausea and vomiting  Patient had an outpatient elective lithotripsy done on the same day, for a right renal stone and post procedure reports he was in a lot of pain and received some pain medication  After he got home he started to vomit  He feels shaky and has chills  Emesis mostly bile, no blood  He reports some upper abdominal pain which he states feels like a muscle pain after vomiting and only occurs when vomiting - otherwise no discomfort  Vitals normal   Labs with a WBC of 11 57  CT of the A/P with a nonobstructing right renal calculi  Hospital Course:    Please see above list of diagnoses and related plan for additional information  Condition at Discharge: stable       Discharge Day Visit / Exam:     Subjective:  Feels much better now   No N/V, ate breakfast  Wants to go home  Vitals: Blood Pressure: 128/70 (12/12/19 0721)  Pulse: 61 (12/12/19 0721)  Temperature: 99 °F (37 2 °C) (12/12/19 0721)  Temp Source: Oral (12/12/19 0721)  Respirations: 15 (12/12/19 0721)  Height: 5' 10" (177 8 cm) (12/11/19 2207)  Weight - Scale: 62 7 kg (138 lb 3 7 oz) (12/11/19 1857)  SpO2: 98 % (12/12/19 0721)  Exam:   Physical Exam   Constitutional: He is oriented to person, place, and time  He appears well-developed  No distress  HENT:   Head: Normocephalic and atraumatic  Cardiovascular: Normal rate, regular rhythm and normal heart sounds  No murmur heard  Pulmonary/Chest: Effort normal and breath sounds normal  No respiratory distress  He has no wheezes  He has no rales  Abdominal: Soft  Bowel sounds are normal  He exhibits no distension  There is no tenderness  There is no rebound  Genitourinary:   Genitourinary Comments: No CVA TTP   Musculoskeletal: He exhibits no edema, tenderness or deformity  Neurological: He is alert and oriented to person, place, and time  Skin: Skin is warm and dry  Psychiatric: He has a normal mood and affect  His behavior is normal    Nursing note and vitals reviewed  Discharge instructions/Information to patient and family:   See after visit summary for information provided to patient and family  Provisions for Follow-Up Care:  See after visit summary for information related to follow-up care and any pertinent home health orders  Disposition:     Home    Planned Readmission: no     Discharge Statement:  I spent >30 minutes discharging the patient  This time was spent on the day of discharge  I had direct contact with the patient on the day of discharge  Greater than 50% of the total time was spent examining patient, answering all patient questions, arranging and discussing plan of care with patient as well as directly providing post-discharge instructions  Additional time then spent on discharge activities      Discharge Medications:  See after visit summary for reconciled discharge medications provided to patient and family        ** Please Note: This note has been constructed using a voice recognition system **

## 2019-12-12 NOTE — NURSING NOTE
Pt stated that he wanted to leave AMA  Sent message to MARIZOL Davis MD via [employer]  En route to bedside

## 2019-12-12 NOTE — H&P
H&P- Erick Garcia 1970, 52 y o  male MRN: [id number]    Unit/Bed#: ED 03 Encounter: [id number]    Primary Care Provider: Scot Neal   Date and time admitted to hospital: 12/11/2019  7:01 PM    * Nausea & vomiting  Assessment & Plan  Patient had an outpatient elective lithotripsy done today for a right renal stone and post procedure reports he was in a lot of pain and received some pain medication  After he got home he started to vomit  He feels shaky and has chills  Emesis mostly bile, no blood  He reports some upper abdominal pain which he states feels like a muscle pain after vomiting and only occurs when vomiting - otherwise no discomfort  Vitals normal   Labs with a WBC of 11 57  CT of the A/P with a nonobstructing right renal calculi  · Admit to medicine  · Continue IVF, antiemetics   · Monitor CBC, fevers  · Advance diet as tolerated    Arthritis  Assessment & Plan  Takes tramadol as an outpatient for arthritis in his thumb  Hold for now    VTE Prophylaxis: early ambulation, low risk  Code Status: No Order    Anticipated Length of Stay:  Patient will be admitted on an Observation basis with an anticipated length of stay of less than 2 midnights  Justification for Hospital Stay: intractable nausea and vomiting    Total Time for Visit, including Counseling / Coordination of Care: 15 minutes  Greater than 50% of this total time spent on direct patient counseling and coordination of care  Chief Complaint:   Vomiting (Patient states he had lithotripsy done today and has had vomiting since then denies pain)      History of Present Illness:    Erick Garcia is a 52 y o  male with a PMH of arthritis who presents with nausea and vomiting  Patient had an outpatient elective lithotripsy done today for a right renal stone and post procedure reports he was in a lot of pain and received some pain medication  After he got home he started to vomit  He feels shaky and has chills    Emesis mostly bile, no blood  He reports some upper abdominal pain which he states feels like a muscle pain after vomiting and only occurs when vomiting - otherwise no discomfort  Vitals normal   Labs with a WBC of 11 57  CT of the A/P with a nonobstructing right renal calculi  Review of Systems:    Review of Systems   Constitutional: Positive for chills  HENT: Negative  Eyes: Negative  Respiratory: Negative  Cardiovascular: Negative  Gastrointestinal: Positive for nausea and vomiting  Negative for abdominal pain  Endocrine: Negative  Genitourinary: Negative  Musculoskeletal: Negative  Skin: Negative  Allergic/Immunologic: Negative  Neurological: Negative  Hematological: Negative  Psychiatric/Behavioral: Negative  Past Medical and Surgical History:     Past Medical History:   Diagnosis Date    Arthritis     left thumb    Kidney stone     right stone       Past Surgical History:   Procedure Laterality Date    EXTRACORPOREAL SHOCK WAVE LITHOTRIPSY      x2    HERNIA REPAIR      Inguinal hernia repair, left     SPLENECTOMY, PARTIAL      repaired, traumatic       Meds/Allergies:    Prior to Admission medications    Medication Sig Start Date End Date Taking? Authorizing Provider   acetaminophen-codeine (TYLENOL #3) 300-30 mg per tablet Take 1 tablet by mouth every 4 (four) hours as needed for moderate pain   Yes Historical Provider, MD   traMADol (ULTRAM) 50 mg tablet TAKE 1 TABLET BY MOUTH EVERY 12 HOURS IF NEEDED FOR MODERATE PAIN 11/12/19  Yes Syble Bores, CRNP       Allergies:    Allergies   Allergen Reactions    Ezetimibe Rash    Levofloxacin Rash    Penicillins Rash     Unsure of reaction    Statins Myalgia       Social History:     Marital Status: /Civil Union   Substance Use History:   Social History     Substance and Sexual Activity   Alcohol Use Yes    Comment: rarely     Social History     Tobacco Use   Smoking Status Former Smoker    Packs/day: 0 50    Years: 20 00    Pack years: 10 00    Last attempt to quit: 2017    Years since quittin 0   Smokeless Tobacco Former User   Tobacco Comment    chew     Social History     Substance and Sexual Activity   Drug Use No       Family History:    Family History   Problem Relation Age of Onset    Coronary artery disease Mother     Emphysema Mother         Emphysema/COPD    Skin cancer Mother     Emphysema Father         Emphysema/COPD    Heart attack Father         MI involving other coronary artery     Diabetes Paternal Uncle     Emphysema Family     Diabetes Family     Skin cancer Family     Nephrolithiasis Family        Physical Exam:     Vitals:   Blood Pressure: 145/67 (19)  Pulse: 64 (19)  Temperature: 98 °F (36 7 °C) (19)  Temp Source: Tympanic (19)  Respirations: 20 (19)  Weight - Scale: 62 7 kg (138 lb 3 7 oz) (19)  SpO2: 100 % (19)    Physical Exam   Constitutional: He is oriented to person, place, and time  He appears well-developed and well-nourished  HENT:   Head: Normocephalic and atraumatic  Eyes: EOM are normal    Neck: Normal range of motion  Cardiovascular: Normal rate, regular rhythm and normal heart sounds  Pulmonary/Chest: Effort normal and breath sounds normal  No respiratory distress  He exhibits no tenderness  Abdominal: Soft  Bowel sounds are normal  He exhibits no distension  There is no tenderness  Musculoskeletal: Normal range of motion  He exhibits no edema  Neurological: He is alert and oriented to person, place, and time  Skin: Skin is warm and dry  There is pallor  Ecchymosis to right flank   Psychiatric: He has a normal mood and affect  His behavior is normal    Nursing note and vitals reviewed  Additional Data:     Lab Results: I have personally reviewed pertinent reports        Results from last 7 days   Lab Units 19   WBC Thousand/uL 11 57* HEMOGLOBIN g/dL 14 1   HEMATOCRIT % 40 8   PLATELETS Thousands/uL 265   NEUTROS PCT % 87*     Results from last 7 days   Lab Units 12/11/19  1916   SODIUM mmol/L 137   POTASSIUM mmol/L 4 1   CHLORIDE mmol/L 100   CO2 mmol/L 24   BUN mg/dL 13   CREATININE mg/dL 1 07   CALCIUM mg/dL 9 2   TOTAL BILIRUBIN mg/dL 1 00   ALK PHOS U/L 37*   ALT U/L 29   AST U/L 24                       Imaging: I have personally reviewed pertinent reports  CT abdomen pelvis wo contrast   Final Result by Brittani Ramos MD (12/11 2038)         1  Nonobstructing right renal calculi measuring up to 3 mm  No evidence of recent passage of a calculus  2   No evidence of bowel obstruction, colitis or diverticulitis  Normal appendix  Workstation performed: [id number]             CT abdomen pelvis wo contrast   Final Result         1  Nonobstructing right renal calculi measuring up to 3 mm  No evidence of recent passage of a calculus  2   No evidence of bowel obstruction, colitis or diverticulitis  Normal appendix  Workstation performed: [id number]             EKG, Pathology, and Other Studies Reviewed on Admission:   · EKG: not applicable     Allscripts / [facility] Records Reviewed: Yes     ** Please Note: This note has been constructed using a voice recognition system   **

## 2019-12-12 NOTE — ASSESSMENT & PLAN NOTE
· Patient s/p elective lithotripsy for right renal stone on the same day of admission , and post procedure had rigth flank pain, N/V and chills  · In the ED Vitals normal   Labs with a WBC of 11 57     · CT of the A/P with a nonobstructing right renal calculi  · He was admitted to observation  · He was treated with IVF, antiemetics   · CBC normalized, he had one time temp of 100 5 but this also resolved  · He tolerated diet and was discharged home  · Symptoms more likely post op side effects

## 2019-12-12 NOTE — PLAN OF CARE
Problem: PAIN - ADULT  Goal: Verbalizes/displays adequate comfort level or baseline comfort level  Description  Interventions:  - Encourage patient to monitor pain and request assistance  - Assess pain using appropriate pain scale  - Administer analgesics based on type and severity of pain and evaluate response  - Implement non-pharmacological measures as appropriate and evaluate response  - Consider cultural and social influences on pain and pain management  - Notify physician/advanced practitioner if interventions unsuccessful or patient reports new pain  Outcome: Progressing     Problem: DISCHARGE PLANNING  Goal: Discharge to home or other facility with appropriate resources  Description  INTERVENTIONS:  - Identify barriers to discharge w/patient and caregiver  - Arrange for needed discharge resources and transportation as appropriate  - Identify discharge learning needs   - Refer to Case Management Department for coordinating discharge planning if the patient needs post-hospital services based on physician/advanced practitioner order or complex needs related to functional status, cognitive ability, or social support system   Outcome: Progressing     Problem: Knowledge Deficit  Goal: Patient/family/caregiver demonstrates understanding of disease process, treatment plan, medications, and discharge instructions  Description  Complete learning assessment and assess knowledge base    Interventions:  - Provide teaching at level of understanding  - Provide teaching via preferred learning methods  Outcome: Progressing     Problem: GASTROINTESTINAL - ADULT  Goal: Minimal or absence of nausea and/or vomiting  Description  INTERVENTIONS:  - Administer IV fluids if ordered to ensure adequate hydration  - Administer ordered antiemetic medications as needed  - Provide nonpharmacologic comfort measures as appropriate  - Advance diet as tolerated, if ordered  - Consider nutrition services referral to assist patient with adequate nutrition and appropriate food choices   Outcome: Progressing

## 2019-12-12 NOTE — UTILIZATION REVIEW
Initial Clinical Review    Admission: Date/Time/Statement:   Orders Placed This Encounter   Procedures    Place in Observation     Standing Status:   Standing     Number of Occurrences:   1     Order Specific Question:   Admitting Physician     Answer:   Stef Escobar     Order Specific Question:   Level of Care     Answer:   Med Surg [16]     ED Arrival Information     Expected Arrival Acuity Means of Arrival Escorted By Service Admission Type    - 12/11/2019 18:53 Urgent Walk-In Family Member Hospitalist Urgent    Arrival Complaint    vomiting        Chief Complaint   Patient presents with    Vomiting     Patient states he had lithotripsy done today and has had vomiting since then denies pain            Assessment/Plan: 52 y o  male with a PMH of arthritis who presents with nausea and vomiting  Patient had an outpatient elective lithotripsy done today for a right renal stone and post procedure reports he was in a lot of pain and received some pain medication  After he got home he started to vomit  He feels shaky and has chills  Emesis mostly bile, no blood  He reports some upper abdominal pain which he states feels like a muscle pain after vomiting and only occurs when vomiting - otherwise no discomfort  Vitals normal   Labs with a WBC of 11 57  CT of the A/P with a nonobstructing right renal calculi  Nausea & vomiting  Assessment & Plan  Patient had an outpatient elective lithotripsy done today for a right renal stone and post procedure reports he was in a lot of pain and received some pain medication  After he got home he started to vomit  He feels shaky and has chills  Emesis mostly bile, no blood  He reports some upper abdominal pain which he states feels like a muscle pain after vomiting and only occurs when vomiting - otherwise no discomfort  Vitals normal   Labs with a WBC of 11 57  CT of the A/P with a nonobstructing right renal calculi      · Admit to medicine  · Continue IVF, antiemetics   · Monitor CBC, fevers  · Advance diet as tolerated     Arthritis  Assessment & Plan  Takes tramadol as an outpatient for arthritis in his thumb  Hold for now     VTE Prophylaxis: early ambulation, low risk  Code Status: No Order     Anticipated Length of Stay:  Patient will be admitted on an Observation basis with an anticipated length of stay of less than 2 midnights  Justification for Hospital Stay: intractable nausea and vomiting  ED Triage Vitals [12/11/19 1857]   Temperature Pulse Respirations Blood Pressure SpO2   98 °F (36 7 °C) 64 20 145/67 100 %      Temp Source Heart Rate Source Patient Position - Orthostatic VS BP Location FiO2 (%)   Tympanic Monitor Sitting Right arm --      Pain Score       No Pain        Wt Readings from Last 1 Encounters:   12/11/19 62 7 kg (138 lb 3 7 oz)     Additional Vital Signs:   Pertinent Labs/Diagnostic Test Results:   Ct abdomen Nonobstructing right renal calculi measuring up to 3 mm   No evidence of recent passage of a calculus  2   No evidence of bowel obstruction, colitis or diverticulitis   Normal appendix     Results from last 7 days   Lab Units 12/12/19  0535 12/11/19 1916   WBC Thousand/uL 9 81 11 57*   HEMOGLOBIN g/dL 12 3 14 1   HEMATOCRIT % 36 2* 40 8   PLATELETS Thousands/uL 225 265   NEUTROS ABS Thousands/µL  --  10 11*         Results from last 7 days   Lab Units 12/12/19  0535 12/11/19 1916   SODIUM mmol/L 140 137   POTASSIUM mmol/L 3 9 4 1   CHLORIDE mmol/L 107 100   CO2 mmol/L 26 24   ANION GAP mmol/L 7 13   BUN mg/dL 11 13   CREATININE mg/dL 0 82 1 07   EGFR ml/min/1 73sq m 104 81   CALCIUM mg/dL 7 6* 9 2     Results from last 7 days   Lab Units 12/11/19 1916   AST U/L 24   ALT U/L 29   ALK PHOS U/L 37*   TOTAL PROTEIN g/dL 7 0   ALBUMIN g/dL 4 2   TOTAL BILIRUBIN mg/dL 1 00         Results from last 7 days   Lab Units 12/12/19  0535 12/11/19 1916   GLUCOSE RANDOM mg/dL 127 147*         Results from last 7 days   Lab Units 12/11/19 1916 LIPASE u/L 82             Results from last 7 days   Lab Units 12/12/19  0549   CLARITY UA  Clear   COLOR UA  Yellow   SPEC GRAV UA  1 015   PH UA  5 5   GLUCOSE UA mg/dl Negative   KETONES UA mg/dl 15 (1+)*   BLOOD UA  Large*   PROTEIN UA mg/dl Trace*   NITRITE UA  Negative   BILIRUBIN UA  Negative   UROBILINOGEN UA E U /dl 0 2   LEUKOCYTES UA  Negative   WBC UA /hpf 1-2*   RBC UA /hpf 10-20*   BACTERIA UA /hpf Occasional   EPITHELIAL CELLS WET PREP /hpf None Seen                                           ED Treatment:   Medication Administration from 12/11/2019 1853 to 12/11/2019 2153       Date/Time Order Dose Route Action Action by Comments     12/11/2019 1916 ondansetron (ZOFRAN) injection 4 mg 4 mg Intravenous Given Zandra Llanes RN      12/11/2019 2115 sodium chloride 0 9 % bolus 1,000 mL 0 mL Intravenous Stopped Zandra Llanes RN      12/11/2019 1915 sodium chloride 0 9 % bolus 1,000 mL 1,000 mL Intravenous New Bag Zandra Llanes RN      12/11/2019 2111 sodium chloride 0 9 % bolus 500 mL 0 mL Intravenous Stopped Zandra Llanes RN      12/11/2019 1937 sodium chloride 0 9 % bolus 500 mL 500 mL Intravenous New Bag Zandra Llanes RN      12/11/2019 1935 ondansetron (ZOFRAN) injection 4 mg 4 mg Intravenous Given Zandra Llanes RN      12/11/2019 1947 morphine (PF) 4 mg/mL injection 4 mg 4 mg Intravenous Given Zandra Llanes RN      12/11/2019 2111 metoclopramide (REGLAN) injection 10 mg 10 mg Intravenous Given Zandra Llanes RN         Past Medical History:   Diagnosis Date    Arthritis     left thumb    Kidney stone     right stone     Present on Admission:  **None**      Admitting Diagnosis: Vomiting [R11 10]  Nausea & vomiting [R11 2]  Age/Sex: 52 y o  male  Admission Orders:  observation  Scheduled Medications:     Continuous IV Infusions:    sodium chloride 125 mL/hr Intravenous Continuous     PRN Meds:    morphine injection 2 mg Intravenous Q3H PRN   ondansetron 4 mg Intravenous Q6H PRN       None    Network Utilization Review Department  [email or web address]  ATTENTION: Please call with any questions or concerns to [phone] and carefully listen to the prompts so that you are directed to the right person  All voicemails are confidential   Crystal Kent all requests for admission clinical reviews, approved or denied determinations and any other requests to dedicated fax number below belonging to the campus where the patient is receiving treatment   List of dedicated fax numbers for the Facilities:  [address] DENIALS (Administrative/Medical Necessity) [phone]   [address] (Maternity/NICU/Pediatrics) [phone]   Isaac Stewart [phone]   Damon Maurer [phone]   Benson Ayala [phone]   [facility] [address] [facility] [phone]   Cheryl Hudson [phone]   Altagracia Monique [address] [facility] [phone]

## 2019-12-12 NOTE — PLAN OF CARE
Problem: PAIN - ADULT  Goal: Verbalizes/displays adequate comfort level or baseline comfort level  Description  Interventions:  - Encourage patient to monitor pain and request assistance  - Assess pain using appropriate pain scale  - Administer analgesics based on type and severity of pain and evaluate response  - Implement non-pharmacological measures as appropriate and evaluate response  - Consider cultural and social influences on pain and pain management  - Notify physician/advanced practitioner if interventions unsuccessful or patient reports new pain  12/12/2019 1046 by Ashley Arias RN  Outcome: Adequate for Discharge  12/12/2019 0731 by Ashley Arias RN  Outcome: Progressing     Problem: DISCHARGE PLANNING  Goal: Discharge to home or other facility with appropriate resources  Description  INTERVENTIONS:  - Identify barriers to discharge w/patient and caregiver  - Arrange for needed discharge resources and transportation as appropriate  - Identify discharge learning needs   - Refer to Case Management Department for coordinating discharge planning if the patient needs post-hospital services based on physician/advanced practitioner order or complex needs related to functional status, cognitive ability, or social support system   12/12/2019 1046 by Ashley Arias RN  Outcome: Adequate for Discharge  12/12/2019 0731 by Ashley Arias RN  Outcome: Progressing     Problem: Knowledge Deficit  Goal: Patient/family/caregiver demonstrates understanding of disease process, treatment plan, medications, and discharge instructions  Description  Complete learning assessment and assess knowledge base    Interventions:  - Provide teaching at level of understanding  - Provide teaching via preferred learning methods  12/12/2019 1046 by Ashley Arias RN  Outcome: Adequate for Discharge  12/12/2019 0731 by Ashley Arias RN  Outcome: Progressing     Problem: GASTROINTESTINAL - ADULT  Goal: Minimal or absence of nausea and/or vomiting  Description  INTERVENTIONS:  - Administer IV fluids if ordered to ensure adequate hydration  - Administer ordered antiemetic medications as needed  - Provide nonpharmacologic comfort measures as appropriate  - Advance diet as tolerated, if ordered  - Consider nutrition services referral to assist patient with adequate nutrition and appropriate food choices   12/12/2019 1046 by Lito Romero RN  Outcome: Adequate for Discharge  12/12/2019 0731 by Lito Romero RN  Outcome: Progressing

## 2019-12-12 NOTE — ASSESSMENT & PLAN NOTE
Patient had an outpatient elective lithotripsy done today for a right renal stone and post procedure reports he was in a lot of pain and received some pain medication  After he got home he started to vomit  He feels shaky and has chills  Emesis mostly bile, no blood  He reports some upper abdominal pain which he states feels like a muscle pain after vomiting and only occurs when vomiting - otherwise no discomfort  Vitals normal   Labs with a WBC of 11 57  CT of the A/P with a nonobstructing right renal calculi      · Admit to medicine  · Continue IVF, antiemetics   · Monitor CBC, fevers  · Advance diet as tolerated

## 2019-12-12 NOTE — ED PROVIDER NOTES
History  Chief Complaint   Patient presents with    Vomiting     Patient states he had lithotripsy done today and has had vomiting since then denies pain     44-year-old male presents to the ED after he had lithotripsy earlier today with vomiting and right flank pain since that time  Patient does have bruising to the right flank is awake alert has been vomiting apparently since he left the hospital earlier today  States he does not feel well no fevers no chills however does have episodes of diaphoresis and sweating  History provided by:  Patient   used: No        Prior to Admission Medications   Prescriptions Last Dose Informant Patient Reported? Taking?   acetaminophen-codeine (TYLENOL #3) 300-30 mg per tablet 12/11/2019 at 1500  Yes Yes   Sig: Take 1 tablet by mouth every 4 (four) hours as needed for moderate pain   traMADol (ULTRAM) 50 mg tablet 12/10/2019 at 1800  No Yes   Sig: TAKE 1 TABLET BY MOUTH EVERY 12 HOURS IF NEEDED FOR MODERATE PAIN      Facility-Administered Medications: None       Past Medical History:   Diagnosis Date    Arthritis     left thumb    Kidney stone     right stone       Past Surgical History:   Procedure Laterality Date    EXTRACORPOREAL SHOCK WAVE LITHOTRIPSY      x2    HERNIA REPAIR      Inguinal hernia repair, left     SPLENECTOMY, PARTIAL      repaired, traumatic       Family History   Problem Relation Age of Onset    Coronary artery disease Mother     Emphysema Mother         Emphysema/COPD    Skin cancer Mother     Emphysema Father         Emphysema/COPD    Heart attack Father         MI involving other coronary artery     Diabetes Paternal Uncle     Emphysema Family     Diabetes Family     Skin cancer Family     Nephrolithiasis Family      I have reviewed and agree with the history as documented      Social History     Tobacco Use    Smoking status: Former Smoker     Packs/day: 0 50     Years: 20 00     Pack years: 10 00     Last attempt to quit: 2017     Years since quittin 0    Smokeless tobacco: Former User    Tobacco comment: chew   Substance Use Topics    Alcohol use: Yes     Comment: rarely    Drug use: No        Review of Systems   Constitutional: Negative for activity change, chills, diaphoresis and fever  HENT: Negative for congestion, ear pain, nosebleeds, sore throat, trouble swallowing and voice change  Eyes: Negative for pain, discharge and redness  Respiratory: Negative for apnea, cough, choking, shortness of breath, wheezing and stridor  Cardiovascular: Negative for chest pain and palpitations  Gastrointestinal: Negative for abdominal distention, abdominal pain, constipation, diarrhea, nausea and vomiting  Endocrine: Negative for polydipsia  Genitourinary: Negative for difficulty urinating, dysuria, flank pain, frequency, hematuria and urgency  Musculoskeletal: Negative for back pain, gait problem, joint swelling, myalgias, neck pain and neck stiffness  Skin: Negative for pallor and rash  Neurological: Negative for dizziness, tremors, syncope, speech difficulty, weakness, numbness and headaches  Hematological: Negative for adenopathy  Psychiatric/Behavioral: Negative for confusion, hallucinations, self-injury and suicidal ideas  The patient is not nervous/anxious  Physical Exam  Physical Exam   Constitutional: He is oriented to person, place, and time  Vital signs are normal  He appears well-developed and well-nourished  No distress  HENT:   Head: Normocephalic and atraumatic  Right Ear: External ear normal    Left Ear: External ear normal    Nose: Nose normal    Mouth/Throat: Oropharynx is clear and moist    Eyes: Pupils are equal, round, and reactive to light  Conjunctivae are normal    Neck: Normal range of motion  Neck supple  Cardiovascular: Normal rate, regular rhythm, normal heart sounds and intact distal pulses     Pulmonary/Chest: Effort normal and breath sounds normal  Abdominal: Soft  Bowel sounds are normal    Musculoskeletal: Normal range of motion  Neurological: He is alert and oriented to person, place, and time  He has normal reflexes  Skin: Skin is warm and dry  He is not diaphoretic  Psychiatric: He has a normal mood and affect  Nursing note and vitals reviewed        Vital Signs  ED Triage Vitals [12/11/19 1857]   Temperature Pulse Respirations Blood Pressure SpO2   98 °F (36 7 °C) 64 20 145/67 100 %      Temp Source Heart Rate Source Patient Position - Orthostatic VS BP Location FiO2 (%)   Tympanic Monitor Sitting Right arm --      Pain Score       No Pain           Vitals:    12/11/19 1857   BP: 145/67   Pulse: 64   Patient Position - Orthostatic VS: Sitting         Visual Acuity      ED Medications  Medications   ondansetron (ZOFRAN) injection 4 mg (4 mg Intravenous Given 12/11/19 1916)   sodium chloride 0 9 % bolus 1,000 mL (1,000 mL Intravenous New Bag 12/11/19 1915)   sodium chloride 0 9 % bolus 500 mL (0 mL Intravenous Stopped 12/11/19 2111)   ondansetron (ZOFRAN) injection 4 mg (4 mg Intravenous Given 12/11/19 1935)   morphine (PF) 4 mg/mL injection 4 mg (4 mg Intravenous Given 12/11/19 1947)   metoclopramide (REGLAN) injection 10 mg (10 mg Intravenous Given 12/11/19 2111)       Diagnostic Studies  Results Reviewed     Procedure Component Value Units Date/Time    Comprehensive metabolic panel [[id number]]  (Abnormal) Collected:  12/11/19 1916    Lab Status:  Final result Specimen:  Blood from Arm, Right Updated:  12/11/19 1937     Sodium 137 mmol/L      Potassium 4 1 mmol/L      Chloride 100 mmol/L      CO2 24 mmol/L      ANION GAP 13 mmol/L      BUN 13 mg/dL      Creatinine 1 07 mg/dL      Glucose 147 mg/dL      Calcium 9 2 mg/dL      AST 24 U/L      ALT 29 U/L      Alkaline Phosphatase 37 U/L      Total Protein 7 0 g/dL      Albumin 4 2 g/dL      Total Bilirubin 1 00 mg/dL      eGFR 81 ml/min/1 73sq m     Narrative:       National Kidney Disease Foundation guidelines for Chronic Kidney Disease (CKD):     Stage 1 with normal or high GFR (GFR > 90 mL/min/1 73 square meters)    Stage 2 Mild CKD (GFR = 60-89 mL/min/1 73 square meters)    Stage 3A Moderate CKD (GFR = 45-59 mL/min/1 73 square meters)    Stage 3B Moderate CKD (GFR = 30-44 mL/min/1 73 square meters)    Stage 4 Severe CKD (GFR = 15-29 mL/min/1 73 square meters)    Stage 5 End Stage CKD (GFR <15 mL/min/1 73 square meters)  Note: GFR calculation is accurate only with a steady state creatinine    Lipase [[id number]]  (Normal) Collected:  12/11/19 1916    Lab Status:  Final result Specimen:  Blood from Arm, Right Updated:  12/11/19 1937     Lipase 82 u/L     CBC and differential [[id number]]  (Abnormal) Collected:  12/11/19 1916    Lab Status:  Final result Specimen:  Blood from Arm, Right Updated:  12/11/19 1921     WBC 11 57 Thousand/uL      RBC 4 54 Million/uL      Hemoglobin 14 1 g/dL      Hematocrit 40 8 %      MCV 90 fL      MCH 31 1 pg      MCHC 34 6 g/dL      RDW 12 5 %      MPV 9 9 fL      Platelets 357 Thousands/uL      nRBC 0 /100 WBCs      Neutrophils Relative 87 %      Immat GRANS % 0 %      Lymphocytes Relative 9 %      Monocytes Relative 4 %      Eosinophils Relative 0 %      Basophils Relative 0 %      Neutrophils Absolute 10 11 Thousands/µL      Immature Grans Absolute 0 04 Thousand/uL      Lymphocytes Absolute 0 98 Thousands/µL      Monocytes Absolute 0 43 Thousand/µL      Eosinophils Absolute 0 00 Thousand/µL      Basophils Absolute 0 01 Thousands/µL                  CT abdomen pelvis wo contrast   Final Result by Ariel Worerll MD (12/11 2038)         1  Nonobstructing right renal calculi measuring up to 3 mm  No evidence of recent passage of a calculus  2   No evidence of bowel obstruction, colitis or diverticulitis  Normal appendix                 Workstation performed: [id number]                    Procedures  Procedures         ED Course MDM  Number of Diagnoses or Management Options  Nausea & vomiting:   Diagnosis management comments: I discussed the case with Dr Alexi Pablo also advised to get a CT scan of the abdomen pelvis to look for hematoma  Later he call back stated small hematoma on the lower pole of the kidney to have hospitalist admit the patient if feel the patient needed stay        Disposition  Final diagnoses:   Nausea & vomiting     Time reflects when diagnosis was documented in both MDM as applicable and the Disposition within this note     Time User Action Codes Description Comment    12/11/2019  9:28 PM Claire CHRISTIANSEN Add [R11 2] Nausea & vomiting       ED Disposition     ED Disposition Condition Date/Time Comment    Admit Stable Wed Dec 11, 2019  9:28 PM Case was discussed with Jose Walker and the patient's admission status was agreed to be Admission Status: observation status to the service of Dr Heard Necessary   Follow-up Information    None         Patient's Medications   Discharge Prescriptions    No medications on file     No discharge procedures on file      ED Provider  Electronically Signed by           Adan Dean DO  12/11/19 0452

## 2019-12-16 ENCOUNTER — OFFICE VISIT (OUTPATIENT)
Dept: FAMILY MEDICINE CLINIC | Facility: CLINIC | Age: 49
End: 2019-12-16
Payer: COMMERCIAL

## 2019-12-16 ENCOUNTER — APPOINTMENT (OUTPATIENT)
Dept: RADIOLOGY | Facility: CLINIC | Age: 49
End: 2019-12-16
Payer: COMMERCIAL

## 2019-12-16 VITALS
HEART RATE: 66 BPM | RESPIRATION RATE: 16 BRPM | TEMPERATURE: 97.9 F | SYSTOLIC BLOOD PRESSURE: 118 MMHG | HEIGHT: 70 IN | WEIGHT: 142.6 LBS | DIASTOLIC BLOOD PRESSURE: 78 MMHG | BODY MASS INDEX: 20.41 KG/M2

## 2019-12-16 DIAGNOSIS — Z98.890 STATUS POST LASER LITHOTRIPSY OF URETERAL CALCULUS: ICD-10-CM

## 2019-12-16 DIAGNOSIS — N20.0 CALCULUS OF KIDNEY: ICD-10-CM

## 2019-12-16 DIAGNOSIS — Z98.890 SEVERE NAUSEA FOLLOWING ANESTHESIA: ICD-10-CM

## 2019-12-16 DIAGNOSIS — Z12.5 PROSTATE CANCER SCREENING: ICD-10-CM

## 2019-12-16 DIAGNOSIS — R73.01 ELEVATED FASTING BLOOD SUGAR: ICD-10-CM

## 2019-12-16 DIAGNOSIS — R11.0 SEVERE NAUSEA FOLLOWING ANESTHESIA: ICD-10-CM

## 2019-12-16 DIAGNOSIS — N40.0 PROSTATE ENLARGEMENT: ICD-10-CM

## 2019-12-16 DIAGNOSIS — Z09 HOSPITAL DISCHARGE FOLLOW-UP: Primary | ICD-10-CM

## 2019-12-16 DIAGNOSIS — Z13.220 LIPID SCREENING: ICD-10-CM

## 2019-12-16 LAB — SL AMB POCT HEMOGLOBIN AIC: 5.1 (ref ?–6.5)

## 2019-12-16 PROCEDURE — 83036 HEMOGLOBIN GLYCOSYLATED A1C: CPT | Performed by: NURSE PRACTITIONER

## 2019-12-16 PROCEDURE — 1111F DSCHRG MED/CURRENT MED MERGE: CPT | Performed by: NURSE PRACTITIONER

## 2019-12-16 PROCEDURE — 74018 RADEX ABDOMEN 1 VIEW: CPT

## 2019-12-16 PROCEDURE — 99495 TRANSJ CARE MGMT MOD F2F 14D: CPT | Performed by: NURSE PRACTITIONER

## 2019-12-16 RX ORDER — ONDANSETRON HYDROCHLORIDE 8 MG/1
TABLET, FILM COATED ORAL
Refills: 0 | COMMUNITY
Start: 2019-12-11 | End: 2019-12-16

## 2019-12-16 NOTE — PROGRESS NOTES
TRANSITION OF CARE OFFICE VISIT  [facility] - [facility]    NAME: Lindsay Pacheco  AGE: 52 y o  SEX: male  : 1970     DATE: 2019     Assessment and Plan:     Problem List Items Addressed This Visit        Genitourinary    Calculus of kidney      Other Visit Diagnoses     Hospital discharge follow-up    -  Primary    Severe nausea following anesthesia        Elevated fasting blood sugar        Relevant Orders    POCT hemoglobin A1c      Patient clinically stable at this time  Cont with current plan of care  RTO as recommended and PRN    The case discussed with patient using patient centered shared decision making  The patient was counseled regarding instructions for management,-- risk factor reductions,-- prognosis,-- impressions,-- risks and benefits of treatment options,-- importance of compliance with treatment  I have reviewed the instructions with the patient, answering all questions to his satisfaction  Given order to recheck bmpfay  Also due for lipids, psa  His A1c was 5 1 he was advised of same           Transitional Care Management Review:     Lindsay Pacheco is a 52 y o  male here for TCM follow-up    During the TCM phone call patient stated:    TCM Call (since 11/15/2019)     Date and time call was made  2019  4:55 PM    Patient was hospitialized at  [address] [place] [address]    Date of Admission  19    Date of discharge  19    Disposition  Home    Current Symptoms  Neausea    Neausea severity  Mild      TCM Call (since 11/15/2019)     Should patient be enrolled in anticoag monitoring? No    Scheduled for follow up?   Yes    Did you obtain your prescribed medications  Yes    Do you need help managing your prescriptions or medications  No    Is transportation to your appointment needed  Yes    I have advised the patient to call PCP with any new or worsening symptoms  [id number]    Living Arrangements  Spouse or Significiant other    Are you recieving any outpatient services  No    Are you recieving home care services  No    Are you using any community resources  No    Current waiver services  No    Have you fallen in the last 12 months  No    Interperter language line needed  No           HPI:     Here to f/u lithotripsy with Dr Kike Huggins on 12/12/19 at [address] [place] [address]  He had post anesthesia n/v which has since resolved  Post procedure CT scan showed a retained 3mm stone  He has been passing bits and pieces of stone since  He is due for KUB  Also he is concerned over report of enlarged prostate on CT scan and elevated FBS on PATs  He is due for PSA  His back is sore s/p procedure otherwise he feels better  He sees Dr Kike Huggins next week  The following portions of the patient's history were reviewed and updated as appropriate: allergies, current medications, past family history, past medical history, past social history, past surgical history and problem list      Review of Systems:     Review of Systems   Constitutional: Negative  Respiratory: Negative  Cardiovascular: Negative  Gastrointestinal: Negative for nausea and vomiting  Endocrine: Negative  Genitourinary: Positive for flank pain  Negative for difficulty urinating and hematuria  Musculoskeletal: Positive for back pain  Neurological: Negative           Problem List:     Patient Active Problem List   Diagnosis    Dyslipidemia    Erectile dysfunction of organic origin    Hand arthropathy    Calculus of kidney    Former tobacco use    Strain of muscle, fascia and tendon of lower back, initial encounter    Left inguinal pain    Inguinal lymphadenopathy    Axillary lymphadenopathy    Encounter for surgical follow-up care    Arthritis    Post op infection        Objective:     /78 (BP Location: Left arm, Patient Position: Sitting, Cuff Size: Standard)   Pulse 66   Temp 97 9 °F (36 6 °C)   Resp 16   Ht 5' 10" (1 778 m)   Wt 64 7 kg (142 lb 9 6 oz)   BMI 20 46 kg/m²     Physical Exam   Constitutional: He is oriented to person, place, and time  Vital signs are normal  He appears well-developed and well-nourished  No distress  Cardiovascular: Normal rate, regular rhythm, normal heart sounds and intact distal pulses  Pulmonary/Chest: Effort normal and breath sounds normal    Abdominal: Soft  Bowel sounds are normal  There is no tenderness  There is no CVA tenderness  Neurological: He is alert and oriented to person, place, and time  Skin: Skin is warm and dry  Capillary refill takes less than 2 seconds  No pallor  Nursing note and vitals reviewed  Laboratory Results: I have personally reviewed the pertinent laboratory results/reports     Radiology/Other Diagnostic Testing Results: I have personally reviewed pertinent reports  Ct Abdomen Pelvis Wo Contrast    Result Date: 12/11/2019  CT ABDOMEN AND PELVIS WITHOUT IV CONTRAST INDICATION:   Right flank pain  COMPARISON:  9/11/2019  TECHNIQUE:  CT examination of the abdomen and pelvis was performed without intravenous contrast   Axial, sagittal, and coronal 2D reformatted images were created from the source data and submitted for interpretation  Radiation dose length product (DLP) for this visit:  461 94 mGy-cm   This examination, like all CT scans performed in the [facility], was performed utilizing techniques to minimize radiation dose exposure, including the use of iterative  reconstruction and automated exposure control  Enteric contrast was administered  FINDINGS: ABDOMEN LOWER CHEST:  Clear lung bases  LIVER/BILIARY TREE:  Unremarkable  GALLBLADDER:  No calcified gallstones  No pericholecystic inflammatory change  SPLEEN:  Unremarkable  PANCREAS:  Unremarkable  ADRENAL GLANDS:  Unremarkable  KIDNEYS/URETERS:  Punctate nonobstructing calculi in right renal lower pole calyces measuring up to 3 mm  STOMACH AND BOWEL:  No bowel obstruction, colitis or diverticulitis   APPENDIX:  Normal appendix  ABDOMINOPELVIC CAVITY:  No ascites or free intraperitoneal air  No lymphadenopathy  VESSELS:  No abdominal aortic aneurysm  PELVIS REPRODUCTIVE ORGANS:  Enlarged prostate  URINARY BLADDER:  Unremarkable  ABDOMINAL WALL/INGUINAL REGIONS:  Unremarkable  OSSEOUS STRUCTURES: Prominent Schmorl's node in the superior T11 endplate, stable  No acute fracture or destructive osseous lesion  1   Nonobstructing right renal calculi measuring up to 3 mm  No evidence of recent passage of a calculus  2   No evidence of bowel obstruction, colitis or diverticulitis  Normal appendix  Workstation performed: [id number]        Current Medications:     Outpatient Medications Prior to Visit   Medication Sig Dispense Refill    traMADol (ULTRAM) 50 mg tablet TAKE 1 TABLET BY MOUTH EVERY 12 HOURS IF NEEDED FOR MODERATE PAIN 60 tablet 0    acetaminophen-codeine (TYLENOL #3) 300-30 mg per tablet Take 1 tablet by mouth every 4 (four) hours as needed for moderate pain      ondansetron (ZOFRAN-ODT) 8 mg disintegrating tablet Take 1 tablet (8 mg total) by mouth every 8 (eight) hours as needed for nausea or vomiting (Patient not taking: Reported on 12/16/2019) 20 tablet 0    ondansetron (ZOFRAN) 8 mg tablet TK 1 T PO Q 6 H PRN  0     No facility-administered medications prior to visit          Dean Orourke, [address]

## 2019-12-17 LAB
BUN SERPL-MCNC: 19 MG/DL (ref 6–24)
BUN/CREAT SERPL: 23 (ref 9–20)
CALCIUM SERPL-MCNC: 10 MG/DL (ref 8.7–10.2)
CHLORIDE SERPL-SCNC: 105 MMOL/L (ref 96–106)
CHOLEST SERPL-MCNC: 217 MG/DL (ref 100–199)
CO2 SERPL-SCNC: 24 MMOL/L (ref 20–29)
CREAT SERPL-MCNC: 0.83 MG/DL (ref 0.76–1.27)
GLUCOSE SERPL-MCNC: 91 MG/DL (ref 65–99)
HDLC SERPL-MCNC: 73 MG/DL
LDLC SERPL CALC-MCNC: 126 MG/DL (ref 0–99)
MICRODELETION SYND BLD/T FISH: NORMAL
POTASSIUM SERPL-SCNC: 4.5 MMOL/L (ref 3.5–5.2)
PSA SERPL-MCNC: 0.9 NG/ML (ref 0–4)
SL AMB EGFR AFRICAN AMERICAN: 119 ML/MIN/1.73
SL AMB EGFR NON AFRICAN AMERICAN: 103 ML/MIN/1.73
SL AMB VLDL CHOLESTEROL CALC: 18 MG/DL (ref 5–40)
SODIUM SERPL-SCNC: 144 MMOL/L (ref 134–144)
TRIGL SERPL-MCNC: 91 MG/DL (ref 0–149)

## 2019-12-23 DIAGNOSIS — M54.50 CHRONIC BILATERAL LOW BACK PAIN WITHOUT SCIATICA: ICD-10-CM

## 2019-12-23 DIAGNOSIS — G89.29 CHRONIC BILATERAL LOW BACK PAIN WITHOUT SCIATICA: ICD-10-CM

## 2019-12-26 RX ORDER — TRAMADOL HYDROCHLORIDE 50 MG/1
TABLET ORAL
Qty: 60 TABLET | Refills: 0 | Status: SHIPPED | OUTPATIENT
Start: 2019-12-26 | End: 2020-02-06 | Stop reason: SDUPTHER

## 2020-02-02 NOTE — LETTER
February 8, 2019     Patient: Mikayla Seen   YOB: 1970   Date of Visit: 2/8/2019       To Whom it May Concern:    Mikayla Seen was seen in my clinic on 2/8/2019  If you have any questions or concerns, please don't hesitate to call           Sincerely,          Kaylie Portillo MD MD at the bedside.      Leila Nation RN  02/01/20 [id number]

## 2020-02-06 DIAGNOSIS — M54.50 CHRONIC BILATERAL LOW BACK PAIN WITHOUT SCIATICA: ICD-10-CM

## 2020-02-06 DIAGNOSIS — G89.29 CHRONIC BILATERAL LOW BACK PAIN WITHOUT SCIATICA: ICD-10-CM

## 2020-02-06 RX ORDER — TRAMADOL HYDROCHLORIDE 50 MG/1
50 TABLET ORAL EVERY 12 HOURS PRN
Qty: 60 TABLET | Refills: 0 | Status: SHIPPED | OUTPATIENT
Start: 2020-02-06 | End: 2020-03-16 | Stop reason: SDUPTHER

## 2020-02-14 ENCOUNTER — OFFICE VISIT (OUTPATIENT)
Dept: FAMILY MEDICINE CLINIC | Facility: CLINIC | Age: 50
End: 2020-02-14
Payer: COMMERCIAL

## 2020-02-14 VITALS
SYSTOLIC BLOOD PRESSURE: 120 MMHG | DIASTOLIC BLOOD PRESSURE: 78 MMHG | WEIGHT: 141.2 LBS | RESPIRATION RATE: 18 BRPM | HEIGHT: 70 IN | HEART RATE: 66 BPM | TEMPERATURE: 97.4 F | BODY MASS INDEX: 20.22 KG/M2

## 2020-02-14 DIAGNOSIS — Z00.00 HEALTH CARE MAINTENANCE: Primary | ICD-10-CM

## 2020-02-14 PROCEDURE — 99396 PREV VISIT EST AGE 40-64: CPT | Performed by: NURSE PRACTITIONER

## 2020-02-14 NOTE — LETTER
February 14, 2020     Patient: Naty Prieto   YOB: 1970   Date of Visit: 2/14/2020       To Whom it May Concern:    Naty Prieto is under my professional care  He was seen in my office on 2/14/2020 for his annual physical  He may return to work on 2/14/20       If you have any questions or concerns, please don't hesitate to call           Sincerely,          ELIESER Haque        CC: No Recipients

## 2020-02-14 NOTE — PROGRESS NOTES
ADULT ANNUAL PHYSICAL  [facility] - [facility]    NAME: Mario Orr  AGE: 52 y o  SEX: male  : 1970     DATE: 2020     Assessment and Plan:     Diagnoses and all orders for this visit:    Health care maintenance      53 yo male in good health    Health maintenance and preventative care screenings were discussed with patient today  Appropriate education was printed on patient's after visit summary  Counseling:  Dental Health: discussed importance of regular tooth brushing, flossing, and dental visits  Injury prevention: discussed safety/seat belts, safety helmets, smoke detectors, carbon dioxide detectors, and smoking near bedding or upholstery  BMI Counseling: Body mass index is 20 26 kg/m²  Discussed with patient's BMI with him  The BMI is in the acceptable range  · Sexual health: discussed sexually transmitted diseases, partner selection, use of condoms, avoidance of unintended pregnancy, and contraceptive alternatives  Return in about 3 months (around 2020)  Chief Complaint:     Chief Complaint   Patient presents with    Annual Exam      History of Present Illness:     Adult Annual Physical   Patient here for a comprehensive physical exam    The patient reports no problems - Denies chest pain, dizziness, syncope, dyspnea at rest   No significant fam h/o CAD  former smoker  quit 1 5 years ago  Diet and Physical Activity  · Diet/Nutrition: well balanced diet  · Weight concerns: none, patient's BMI is between 18 5-24 9  · Exercise: walking  Depression Screening  PHQ-9 Depression Screening    PHQ-9:    Frequency of the following problems over the past two weeks:       Little interest or pleasure in doing things:  0 - not at all  Feeling down, depressed, or hopeless:  0 - not at all  PHQ-2 Score:  0       General Health  · Sleep: sleeps well  · Hearing: normal - bilateral   · Vision: wears glasses  · Dental: regular dental visits   Health  · Erectile dysfunction: no      Review of Systems:     Review of Systems   Respiratory:        Exertional dyspnea per hpi   All other systems reviewed and are negative  Past Medical History:     Past Medical History:   Diagnosis Date    Arthritis     left thumb    Kidney stone     right stone      Past Surgical History:     Past Surgical History:   Procedure Laterality Date    EXTRACORPOREAL SHOCK WAVE LITHOTRIPSY      x2    HERNIA REPAIR      Inguinal hernia repair, left     OR FRAGMENT KIDNEY STONE/ ESWL Right 2019    Procedure: LITHOTRIPSY EXTRACORPORAL SHOCKWAVE (ESWL);   Surgeon: Annetta Cabrera MD;  Location: [facility] MAIN OR;  Service: Urology    SPLENECTOMY, PARTIAL      repaired, traumatic      Social History:     Social History     Socioeconomic History    Marital status: /Civil Union     Spouse name: None    Number of children: None    Years of education: None    Highest education level: None   Occupational History    None   Social Needs    Financial resource strain: None    Food insecurity:     Worry: None     Inability: None    Transportation needs:     Medical: None     Non-medical: None   Tobacco Use    Smoking status: Former Smoker     Packs/day: 0 50     Years: 20 00     Pack years: 10 00     Last attempt to quit: 2017     Years since quittin 1    Smokeless tobacco: Former User    Tobacco comment: chew   Substance and Sexual Activity    Alcohol use: Not Currently     Comment: rarely    Drug use: No    Sexual activity: None   Lifestyle    Physical activity:     Days per week: None     Minutes per session: None    Stress: None   Relationships    Social connections:     Talks on phone: None     Gets together: None     Attends Scientologist service: None     Active member of club or organization: None     Attends meetings of clubs or organizations: None     Relationship status: None    Intimate partner violence:     Fear of current or ex partner: None     Emotionally abused: None     Physically abused: None     Forced sexual activity: None   Other Topics Concern    None   Social History Narrative    History of current every day smoker - as per [employer]    Former smoker - as per [employer]       Family History:     Family History   Problem Relation Age of Onset    Coronary artery disease Mother     Emphysema Mother         Emphysema/COPD    Skin cancer Mother     Emphysema Father         Emphysema/COPD    Heart attack Father         MI involving other coronary artery     Diabetes Paternal Uncle     Emphysema Family     Diabetes Family     Skin cancer Family     Nephrolithiasis Family       Current Medications:     Current Outpatient Medications   Medication Sig Dispense Refill    traMADol (ULTRAM) 50 mg tablet Take 1 tablet (50 mg total) by mouth every 12 (twelve) hours as needed for moderate pain 60 tablet 0     No current facility-administered medications for this visit  Allergies: Allergies   Allergen Reactions    Ezetimibe Rash    Levofloxacin Rash    Penicillins Rash     Unsure of reaction    Statins Myalgia      Objective:     /78 (BP Location: Left arm, Patient Position: Sitting, Cuff Size: Standard)   Pulse 66   Temp (!) 97 4 °F (36 3 °C)   Resp 18   Ht 5' 10" (1 778 m)   Wt 64 kg (141 lb 3 2 oz)   BMI 20 26 kg/m²   Physical Exam   Constitutional: He is oriented to person, place, and time  Vital signs are normal  He appears well-developed and well-nourished  No distress  HENT:   Head: Normocephalic and atraumatic  Right Ear: External ear normal    Left Ear: External ear normal    Nose: Nose normal    Mouth/Throat: Oropharynx is clear and moist    Eyes: Pupils are equal, round, and reactive to light  Conjunctivae, EOM and lids are normal    Neck: Normal range of motion  Neck supple  Carotid bruit is not present  No thyromegaly present     Cardiovascular: Normal rate, regular rhythm, normal heart sounds and intact distal pulses  No murmur heard  Pulmonary/Chest: Effort normal and breath sounds normal  No respiratory distress  Abdominal: Soft  Bowel sounds are normal  He exhibits no distension  There is no hepatosplenomegaly  There is no tenderness  No hernia  Genitourinary:   Genitourinary Comments: Decline exam   Musculoskeletal: He exhibits no edema or deformity  Lymphadenopathy:     He has no cervical adenopathy  Neurological: He is alert and oriented to person, place, and time  No cranial nerve deficit or sensory deficit  He exhibits normal muscle tone  Coordination normal    Skin: Skin is warm, dry and intact  Capillary refill takes less than 2 seconds  Psychiatric: He has a normal mood and affect  Nursing note and vitals reviewed      Pulmonary Functions Testing Results:  Normal study     Health Maintenance:     Health Maintenance Topics with due status: Not Due       Topic Last Completion Date    DTaP,Tdap,and Td Vaccines 09/01/2017    Depression Screening PHQ 12/16/2019    BMI: Adult 12/16/2019    Pneumococcal Vaccine: 65+ Years Not Due     Health Maintenance Topics with due status: Overdue       Topic Date Due    HIV Screening 11/03/1985    Annual Physical 11/26/2019     Immunization History   Administered Date(s) Administered    Influenza, injectable, quadrivalent, preservative free 0 5 mL 11/04/2019    Tdap 09/01/2017       Marimar Stewart, [address]

## 2020-03-16 DIAGNOSIS — M54.50 CHRONIC BILATERAL LOW BACK PAIN WITHOUT SCIATICA: ICD-10-CM

## 2020-03-16 DIAGNOSIS — G89.29 CHRONIC BILATERAL LOW BACK PAIN WITHOUT SCIATICA: ICD-10-CM

## 2020-03-16 RX ORDER — TRAMADOL HYDROCHLORIDE 50 MG/1
50 TABLET ORAL EVERY 12 HOURS PRN
Qty: 60 TABLET | Refills: 0 | Status: SHIPPED | OUTPATIENT
Start: 2020-03-16 | End: 2020-04-16 | Stop reason: SDUPTHER

## 2020-04-16 DIAGNOSIS — M54.50 CHRONIC BILATERAL LOW BACK PAIN WITHOUT SCIATICA: ICD-10-CM

## 2020-04-16 DIAGNOSIS — G89.29 CHRONIC BILATERAL LOW BACK PAIN WITHOUT SCIATICA: ICD-10-CM

## 2020-04-16 RX ORDER — TRAMADOL HYDROCHLORIDE 50 MG/1
50 TABLET ORAL EVERY 12 HOURS PRN
Qty: 60 TABLET | Refills: 0 | Status: SHIPPED | OUTPATIENT
Start: 2020-04-16 | End: 2020-05-19 | Stop reason: SDUPTHER

## 2020-05-19 ENCOUNTER — TELEMEDICINE (OUTPATIENT)
Dept: FAMILY MEDICINE CLINIC | Facility: CLINIC | Age: 50
End: 2020-05-19
Payer: COMMERCIAL

## 2020-05-19 VITALS — SYSTOLIC BLOOD PRESSURE: 122 MMHG | DIASTOLIC BLOOD PRESSURE: 70 MMHG

## 2020-05-19 DIAGNOSIS — M54.50 CHRONIC BILATERAL LOW BACK PAIN WITHOUT SCIATICA: ICD-10-CM

## 2020-05-19 DIAGNOSIS — G89.29 CHRONIC BILATERAL LOW BACK PAIN WITHOUT SCIATICA: ICD-10-CM

## 2020-05-19 PROCEDURE — 99213 OFFICE O/P EST LOW 20 MIN: CPT | Performed by: NURSE PRACTITIONER

## 2020-05-19 RX ORDER — TRAMADOL HYDROCHLORIDE 50 MG/1
50 TABLET ORAL EVERY 12 HOURS PRN
Qty: 60 TABLET | Refills: 2 | Status: SHIPPED | OUTPATIENT
Start: 2020-05-19 | End: 2020-08-14 | Stop reason: SDUPTHER

## 2020-07-13 ENCOUNTER — CONSULT (OUTPATIENT)
Dept: OBGYN CLINIC | Facility: CLINIC | Age: 50
End: 2020-07-13
Payer: COMMERCIAL

## 2020-07-13 ENCOUNTER — APPOINTMENT (OUTPATIENT)
Dept: RADIOLOGY | Facility: CLINIC | Age: 50
End: 2020-07-13
Payer: COMMERCIAL

## 2020-07-13 ENCOUNTER — OFFICE VISIT (OUTPATIENT)
Dept: URGENT CARE | Facility: CLINIC | Age: 50
End: 2020-07-13
Payer: COMMERCIAL

## 2020-07-13 VITALS
HEART RATE: 66 BPM | BODY MASS INDEX: 20.29 KG/M2 | DIASTOLIC BLOOD PRESSURE: 73 MMHG | TEMPERATURE: 97.1 F | HEIGHT: 69 IN | RESPIRATION RATE: 12 BRPM | WEIGHT: 137 LBS | SYSTOLIC BLOOD PRESSURE: 108 MMHG | OXYGEN SATURATION: 96 %

## 2020-07-13 VITALS
DIASTOLIC BLOOD PRESSURE: 70 MMHG | SYSTOLIC BLOOD PRESSURE: 110 MMHG | HEART RATE: 56 BPM | TEMPERATURE: 96.5 F | WEIGHT: 137 LBS | BODY MASS INDEX: 19.61 KG/M2 | HEIGHT: 70 IN

## 2020-07-13 DIAGNOSIS — M79.645 CHRONIC PAIN OF LEFT THUMB: ICD-10-CM

## 2020-07-13 DIAGNOSIS — G89.29 CHRONIC PAIN OF LEFT THUMB: Primary | ICD-10-CM

## 2020-07-13 DIAGNOSIS — G89.29 CHRONIC PAIN OF LEFT THUMB: ICD-10-CM

## 2020-07-13 DIAGNOSIS — M18.12 ARTHRITIS OF CARPOMETACARPAL (CMC) JOINT OF LEFT THUMB: Primary | ICD-10-CM

## 2020-07-13 DIAGNOSIS — M79.645 CHRONIC PAIN OF LEFT THUMB: Primary | ICD-10-CM

## 2020-07-13 PROCEDURE — 1036F TOBACCO NON-USER: CPT | Performed by: FAMILY MEDICINE

## 2020-07-13 PROCEDURE — 1036F TOBACCO NON-USER: CPT | Performed by: ORTHOPAEDIC SURGERY

## 2020-07-13 PROCEDURE — 3008F BODY MASS INDEX DOCD: CPT | Performed by: ORTHOPAEDIC SURGERY

## 2020-07-13 PROCEDURE — 20600 DRAIN/INJ JOINT/BURSA W/O US: CPT | Performed by: ORTHOPAEDIC SURGERY

## 2020-07-13 PROCEDURE — 99213 OFFICE O/P EST LOW 20 MIN: CPT | Performed by: FAMILY MEDICINE

## 2020-07-13 PROCEDURE — 73140 X-RAY EXAM OF FINGER(S): CPT

## 2020-07-13 PROCEDURE — 99203 OFFICE O/P NEW LOW 30 MIN: CPT | Performed by: ORTHOPAEDIC SURGERY

## 2020-07-13 PROCEDURE — 3008F BODY MASS INDEX DOCD: CPT | Performed by: FAMILY MEDICINE

## 2020-07-13 RX ORDER — LIDOCAINE HYDROCHLORIDE 5 MG/ML
0.5 INJECTION, SOLUTION INFILTRATION; PERINEURAL
Status: COMPLETED | OUTPATIENT
Start: 2020-07-13 | End: 2020-07-13

## 2020-07-13 RX ORDER — TRIAMCINOLONE ACETONIDE 40 MG/ML
20 INJECTION, SUSPENSION INTRA-ARTICULAR; INTRAMUSCULAR
Status: COMPLETED | OUTPATIENT
Start: 2020-07-13 | End: 2020-07-13

## 2020-07-13 RX ADMIN — LIDOCAINE HYDROCHLORIDE 0.5 ML: 5 INJECTION, SOLUTION INFILTRATION; PERINEURAL at 17:14

## 2020-07-13 RX ADMIN — TRIAMCINOLONE ACETONIDE 20 MG: 40 INJECTION, SUSPENSION INTRA-ARTICULAR; INTRAMUSCULAR at 17:14

## 2020-07-13 NOTE — PROGRESS NOTES
[employer] [facility]        NAME: Steph Massey is a 52 y o  male  : 1970    MRN: [id number]  DATE: 2020  TIME: 10:40 AM    Assessment and Plan   Chronic pain of left thumb [M79 645, G89 29]  1  Chronic pain of left thumb  XR thumb left first digit-min 2v    Ambulatory referral to Orthopedic Surgery     Thumb x-ray with osteophytes, but no fractures seen; official read pending  Due to pain level within the setting of current Ultram use, will refer to hand surgery for further evaluation and management  Patient Instructions     Follow up with PCP in 3-5 days  Proceed to  ER if symptoms worsen  Chief Complaint     Chief Complaint   Patient presents with    Thumb Pain     LEFT THUMB, PAIN FOR ABOUT 1 - 1/2 WEEKS  NO KNOWN INJRY/TRAUMA  HISTORY OF ARTHRITIS IN THUMB  History of Present Illness       70-year-old right-hand-dominant male presents today with left thumb pain which is described as acute on chronic  Has dealt with left thumb arthritis, lower back pain, bilateral knee and ankle pain for some years  Currently takes tramadol 1-2 times daily for pain control  However over the past 1 week he has noticed left pain progressively worsening without any obvious cause for onset  Denies any trauma or overuse of the left thumb  Also reports swelling at the base of the thumb, but denies any stiffness  Otherwise no associated symptoms  Review of Systems   Review of Systems   Constitutional: Negative for chills and fever  Respiratory: Negative for cough and shortness of breath  Cardiovascular: Negative for chest pain  Gastrointestinal: Negative for abdominal pain and nausea  Musculoskeletal: Positive for arthralgias and joint swelling  Neurological: Negative for dizziness and headaches         Current Medications       Current Outpatient Medications:     traMADol (ULTRAM) 50 mg tablet, Take 1 tablet (50 mg total) by mouth every 12 (twelve) hours as needed for moderate pain, Disp: 60 tablet, Rfl: 2    Current Allergies     Allergies as of 07/13/2020 - Reviewed 07/13/2020   Allergen Reaction Noted    Ezetimibe Rash 08/23/2016    Levofloxacin Rash 11/08/2013    Penicillins Rash 11/08/2013    Statins Myalgia 08/23/2016            The following portions of the patient's history were reviewed and updated as appropriate: allergies, current medications, past family history, past medical history, past social history, past surgical history and problem list      Past Medical History:   Diagnosis Date    Arthritis     left thumb    Kidney stone     right stone       Past Surgical History:   Procedure Laterality Date    EXTRACORPOREAL SHOCK WAVE LITHOTRIPSY      x2    HERNIA REPAIR      Inguinal hernia repair, left     OTHER SURGICAL HISTORY N/A 01/11/1990    HAD SPLEEN REPAIR SURGERY, TRAUMATIC REPTURE REPAIR    NM FRAGMENT KIDNEY STONE/ ESWL Right 12/11/2019    Procedure: LITHOTRIPSY EXTRACORPORAL SHOCKWAVE (ESWL); Surgeon: Alvarado Abdul MD;  Location: [facility] MAIN OR;  Service: Urology    SPLENECTOMY, PARTIAL      repaired, traumatic       Family History   Problem Relation Age of Onset    Coronary artery disease Mother     Emphysema Mother         Emphysema/COPD    Skin cancer Mother     Emphysema Father         Emphysema/COPD    Heart attack Father         MI involving other coronary artery     Diabetes Paternal Uncle     Emphysema Family     Diabetes Family     Skin cancer Family     Nephrolithiasis Family          Medications have been verified  Objective   /73   Pulse 66   Temp (!) 97 1 °F (36 2 °C) (Tympanic)   Resp 12   Ht 5' 9" (1 753 m)   Wt 62 1 kg (137 lb)   SpO2 96%   BMI 20 23 kg/m²        Physical Exam     Physical Exam   Constitutional: He is oriented to person, place, and time  He appears well-developed and well-nourished  No distress  HENT:   Head: Normocephalic and atraumatic     Eyes: Conjunctivae are normal  Right eye exhibits no discharge  Left eye exhibits no discharge  Pulmonary/Chest: Effort normal    Musculoskeletal: Normal range of motion  He exhibits edema and tenderness  He exhibits no deformity  Anatomical snuffbox of the left hand not visible due to edema  Tender to palpation of proximal base of the 1st metacarpal   5/5  strength, 4/5 abduction of the left thumb due to pain  Neurological: He is alert and oriented to person, place, and time  Skin: Skin is warm  He is not diaphoretic  No erythema  Psychiatric: He has a normal mood and affect  His behavior is normal  Judgment and thought content normal    Nursing note and vitals reviewed

## 2020-07-13 NOTE — PROGRESS NOTES
Assessment/Plan:  1  Arthritis of carpometacarpal (CMC) joint of left thumb  Small joint arthrocentesis: L thumb CMC       Scribe Attestation    I,:   Fanta Macias MA am acting as a scribe while in the presence of the attending physician :        I,:   Stef Madison DO personally performed the services described in this documentation    as scribed in my presence :              I discussed with Olya Garibay that his signs and symptoms are consistent with left thumb CMC arthritis  X-rays demonstrate significant arthritis  He is tender to palpation over the thumb CMC joint  There is a positive grind  Treatment options were discussed in the form of bracing and a steroid injection  He was agreeable to this  He consented and underwent a left thumb CMC injection in the office today without any complications  He was fitted and provided with a comfort cool brace  We did briefly discuss surgical intervention in the form of a fusion  He will follow up in 3 months for repeat evaluation  Subjective:   Jhonathan Diaz is a 52 y o  male who presents to the office today for evaluation of left thumb pain  Patient states this has been ongoing for the past week and a half  He notes pain to the base of his thumb  He states this is increased with pinch and   Patient states he has known arthritis in the thumb  He denies any prior fractures  He denies any known injury  He notes swelling to the base of his thumb  He denies any numbness or tingling  Patient went to urgent care earlier today where x-rays were taken and he was told to follow up with orthopedics  Review of Systems   Constitutional: Negative for chills and fever  HENT: Negative for drooling and sneezing  Eyes: Negative for redness  Respiratory: Negative for cough and wheezing  Gastrointestinal: Negative for nausea and vomiting  Musculoskeletal: Negative for arthralgias, joint swelling and myalgias  Neurological: Negative for weakness and numbness  Psychiatric/Behavioral: Negative for behavioral problems  The patient is not nervous/anxious  Past Medical History:   Diagnosis Date    Arthritis     left thumb    Kidney stone     right stone       Past Surgical History:   Procedure Laterality Date    EXTRACORPOREAL SHOCK WAVE LITHOTRIPSY      x2    HERNIA REPAIR      Inguinal hernia repair, left     OTHER SURGICAL HISTORY N/A 1990    HAD SPLEEN REPAIR SURGERY, TRAUMATIC REPTURE REPAIR    [place] FRAGMENT KIDNEY STONE/ ESWL Right 2019    Procedure: LITHOTRIPSY EXTRACORPORAL SHOCKWAVE (ESWL);   Surgeon: William Zeng MD;  Location: [facility] MAIN OR;  Service: Urology    SPLENECTOMY, PARTIAL      repaired, traumatic       Family History   Problem Relation Age of Onset    Coronary artery disease Mother     Emphysema Mother         Emphysema/COPD    Skin cancer Mother     Emphysema Father         Emphysema/COPD    Heart attack Father         MI involving other coronary artery     Diabetes Paternal Uncle     Emphysema Family     Diabetes Family     Skin cancer Family     Nephrolithiasis Family        Social History     Occupational History    Not on file   Tobacco Use    Smoking status: Former Smoker     Packs/day: 0 50     Years: 20 00     Pack years: 10 00     Last attempt to quit: 2017     Years since quittin 6    Smokeless tobacco: Former User    Tobacco comment: chew   Substance and Sexual Activity    Alcohol use: Not Currently     Comment: rarely    Drug use: No    Sexual activity: Not on file         Current Outpatient Medications:     traMADol (ULTRAM) 50 mg tablet, Take 1 tablet (50 mg total) by mouth every 12 (twelve) hours as needed for moderate pain, Disp: 60 tablet, Rfl: 2    Allergies   Allergen Reactions    Ezetimibe Rash    Levofloxacin Rash    Penicillins Rash     Unsure of reaction    Statins Myalgia       Objective:  Vitals:    20 1705   BP: 110/70   Pulse: 56   Temp: (!) 96 5 °F (35 8 °C)       Ortho Exam     Left thumb    TTP thumb CMC  + grind  NTTP 1st dorsal compartment   Compartments soft  Brisk capillary refill  S/m intact median, radial, and ulnar nerve     Physical Exam   Constitutional: He is oriented to person, place, and time  He appears well-developed and well-nourished  HENT:   Head: Normocephalic and atraumatic  Eyes: Conjunctivae are normal  Right eye exhibits no discharge  Left eye exhibits no discharge  Neck: Normal range of motion  Neck supple  Cardiovascular: Normal rate and intact distal pulses  Pulmonary/Chest: Effort normal  No respiratory distress  Musculoskeletal:   As noted in HPI   Neurological: He is alert and oriented to person, place, and time  Skin: Skin is warm and dry  Psychiatric: He has a normal mood and affect  His behavior is normal  Judgment and thought content normal      Small joint arthrocentesis: L thumb CMC  Date/Time: 7/13/2020 5:14 PM  Consent given by: patient  Site marked: site marked  Timeout: Immediately prior to procedure a time out was called to verify the correct patient, procedure, equipment, support staff and site/side marked as required   Supporting Documentation  Indications: pain   Procedure Details  Location: thumb - L thumb CMC  Preparation: Patient was prepped and draped in the usual sterile fashion  Needle size: 27 G  Ultrasound guidance: no  Medications administered: 0 5 mL lidocaine 0 5 %; 20 mg triamcinolone acetonide 40 mg/mL    Patient tolerance: patient tolerated the procedure well with no immediate complications  Dressing:  Sterile dressing applied            I have personally reviewed pertinent films in PACS and my interpretation is as follows:X-ray left thumb performed on 7/13/20 demonstrates thumb CMC arthritis

## 2020-08-14 DIAGNOSIS — G89.29 CHRONIC BILATERAL LOW BACK PAIN WITHOUT SCIATICA: ICD-10-CM

## 2020-08-14 DIAGNOSIS — M54.50 CHRONIC BILATERAL LOW BACK PAIN WITHOUT SCIATICA: ICD-10-CM

## 2020-08-14 RX ORDER — TRAMADOL HYDROCHLORIDE 50 MG/1
50 TABLET ORAL EVERY 12 HOURS PRN
Qty: 60 TABLET | Refills: 2 | Status: SHIPPED | OUTPATIENT
Start: 2020-08-14 | End: 2020-11-17 | Stop reason: SDUPTHER

## 2020-10-13 ENCOUNTER — OFFICE VISIT (OUTPATIENT)
Dept: OBGYN CLINIC | Facility: CLINIC | Age: 50
End: 2020-10-13
Payer: COMMERCIAL

## 2020-10-13 VITALS
WEIGHT: 137 LBS | BODY MASS INDEX: 19.61 KG/M2 | HEIGHT: 70 IN | SYSTOLIC BLOOD PRESSURE: 119 MMHG | HEART RATE: 59 BPM | DIASTOLIC BLOOD PRESSURE: 79 MMHG | TEMPERATURE: 95.7 F

## 2020-10-13 DIAGNOSIS — M18.12 ARTHRITIS OF CARPOMETACARPAL (CMC) JOINT OF LEFT THUMB: Primary | ICD-10-CM

## 2020-10-13 PROCEDURE — 1036F TOBACCO NON-USER: CPT | Performed by: ORTHOPAEDIC SURGERY

## 2020-10-13 PROCEDURE — 20600 DRAIN/INJ JOINT/BURSA W/O US: CPT | Performed by: ORTHOPAEDIC SURGERY

## 2020-10-13 PROCEDURE — 99213 OFFICE O/P EST LOW 20 MIN: CPT | Performed by: ORTHOPAEDIC SURGERY

## 2020-10-13 RX ORDER — TRIAMCINOLONE ACETONIDE 40 MG/ML
20 INJECTION, SUSPENSION INTRA-ARTICULAR; INTRAMUSCULAR
Status: COMPLETED | OUTPATIENT
Start: 2020-10-13 | End: 2020-10-13

## 2020-10-13 RX ORDER — LIDOCAINE HYDROCHLORIDE 5 MG/ML
0.5 INJECTION, SOLUTION INFILTRATION; PERINEURAL
Status: COMPLETED | OUTPATIENT
Start: 2020-10-13 | End: 2020-10-13

## 2020-10-13 RX ADMIN — TRIAMCINOLONE ACETONIDE 20 MG: 40 INJECTION, SUSPENSION INTRA-ARTICULAR; INTRAMUSCULAR at 08:25

## 2020-10-13 RX ADMIN — LIDOCAINE HYDROCHLORIDE 0.5 ML: 5 INJECTION, SOLUTION INFILTRATION; PERINEURAL at 08:25

## 2020-11-17 DIAGNOSIS — M54.50 CHRONIC BILATERAL LOW BACK PAIN WITHOUT SCIATICA: ICD-10-CM

## 2020-11-17 DIAGNOSIS — G89.29 CHRONIC BILATERAL LOW BACK PAIN WITHOUT SCIATICA: ICD-10-CM

## 2020-11-17 RX ORDER — TRAMADOL HYDROCHLORIDE 50 MG/1
50 TABLET ORAL EVERY 12 HOURS PRN
Qty: 60 TABLET | Refills: 2 | Status: SHIPPED | OUTPATIENT
Start: 2020-11-17 | End: 2021-01-21 | Stop reason: SDUPTHER

## 2020-12-14 ENCOUNTER — TRANSCRIBE ORDERS (OUTPATIENT)
Dept: RADIOLOGY | Facility: CLINIC | Age: 50
End: 2020-12-14

## 2020-12-14 ENCOUNTER — APPOINTMENT (OUTPATIENT)
Dept: RADIOLOGY | Facility: CLINIC | Age: 50
End: 2020-12-14
Payer: COMMERCIAL

## 2020-12-14 DIAGNOSIS — N20.0 URIC ACID NEPHROLITHIASIS: Primary | ICD-10-CM

## 2020-12-14 DIAGNOSIS — Z87.442 PERSONAL HISTORY OF URINARY CALCULI: ICD-10-CM

## 2020-12-14 DIAGNOSIS — N20.0 URIC ACID NEPHROLITHIASIS: ICD-10-CM

## 2020-12-14 PROCEDURE — 74018 RADEX ABDOMEN 1 VIEW: CPT

## 2021-01-21 DIAGNOSIS — M54.50 CHRONIC BILATERAL LOW BACK PAIN WITHOUT SCIATICA: ICD-10-CM

## 2021-01-21 DIAGNOSIS — G89.29 CHRONIC BILATERAL LOW BACK PAIN WITHOUT SCIATICA: ICD-10-CM

## 2021-01-21 RX ORDER — TRAMADOL HYDROCHLORIDE 50 MG/1
50 TABLET ORAL EVERY 12 HOURS PRN
Qty: 60 TABLET | Refills: 2 | Status: SHIPPED | OUTPATIENT
Start: 2021-01-21 | End: 2021-02-26 | Stop reason: SDUPTHER

## 2021-02-16 ENCOUNTER — OFFICE VISIT (OUTPATIENT)
Dept: FAMILY MEDICINE CLINIC | Facility: CLINIC | Age: 51
End: 2021-02-16
Payer: COMMERCIAL

## 2021-02-16 ENCOUNTER — OFFICE VISIT (OUTPATIENT)
Dept: OBGYN CLINIC | Facility: CLINIC | Age: 51
End: 2021-02-16
Payer: COMMERCIAL

## 2021-02-16 VITALS
SYSTOLIC BLOOD PRESSURE: 108 MMHG | HEIGHT: 70 IN | WEIGHT: 141 LBS | BODY MASS INDEX: 20.19 KG/M2 | HEART RATE: 66 BPM | DIASTOLIC BLOOD PRESSURE: 73 MMHG

## 2021-02-16 VITALS
DIASTOLIC BLOOD PRESSURE: 72 MMHG | OXYGEN SATURATION: 98 % | SYSTOLIC BLOOD PRESSURE: 116 MMHG | WEIGHT: 141.4 LBS | TEMPERATURE: 98.2 F | HEART RATE: 65 BPM | RESPIRATION RATE: 18 BRPM | BODY MASS INDEX: 20.24 KG/M2 | HEIGHT: 70 IN

## 2021-02-16 DIAGNOSIS — Z13.220 LIPID SCREENING: ICD-10-CM

## 2021-02-16 DIAGNOSIS — Z79.899 HIGH RISK MEDICATION USE: ICD-10-CM

## 2021-02-16 DIAGNOSIS — Z12.11 COLON CANCER SCREENING: ICD-10-CM

## 2021-02-16 DIAGNOSIS — Z12.5 PROSTATE CANCER SCREENING: ICD-10-CM

## 2021-02-16 DIAGNOSIS — E55.9 VITAMIN D DEFICIENCY: ICD-10-CM

## 2021-02-16 DIAGNOSIS — M18.12 ARTHRITIS OF CARPOMETACARPAL (CMC) JOINT OF LEFT THUMB: Primary | ICD-10-CM

## 2021-02-16 DIAGNOSIS — M19.049 HAND ARTHROPATHY: ICD-10-CM

## 2021-02-16 DIAGNOSIS — Z13.29 SCREENING FOR THYROID DISORDER: ICD-10-CM

## 2021-02-16 DIAGNOSIS — E78.5 DYSLIPIDEMIA: ICD-10-CM

## 2021-02-16 DIAGNOSIS — Z13.0 SCREENING, DEFICIENCY ANEMIA, IRON: ICD-10-CM

## 2021-02-16 DIAGNOSIS — Z00.00 HEALTH CARE MAINTENANCE: Primary | ICD-10-CM

## 2021-02-16 DIAGNOSIS — Z13.1 SCREENING FOR DIABETES MELLITUS: ICD-10-CM

## 2021-02-16 PROCEDURE — 20600 DRAIN/INJ JOINT/BURSA W/O US: CPT | Performed by: ORTHOPAEDIC SURGERY

## 2021-02-16 PROCEDURE — 3008F BODY MASS INDEX DOCD: CPT | Performed by: NURSE PRACTITIONER

## 2021-02-16 PROCEDURE — 36415 COLL VENOUS BLD VENIPUNCTURE: CPT | Performed by: NURSE PRACTITIONER

## 2021-02-16 PROCEDURE — 3725F SCREEN DEPRESSION PERFORMED: CPT | Performed by: NURSE PRACTITIONER

## 2021-02-16 PROCEDURE — 1036F TOBACCO NON-USER: CPT | Performed by: ORTHOPAEDIC SURGERY

## 2021-02-16 PROCEDURE — 99396 PREV VISIT EST AGE 40-64: CPT | Performed by: NURSE PRACTITIONER

## 2021-02-16 PROCEDURE — 99213 OFFICE O/P EST LOW 20 MIN: CPT | Performed by: ORTHOPAEDIC SURGERY

## 2021-02-16 RX ORDER — LIDOCAINE HYDROCHLORIDE 10 MG/ML
0.5 INJECTION, SOLUTION INFILTRATION; PERINEURAL
Status: COMPLETED | OUTPATIENT
Start: 2021-02-16 | End: 2021-02-16

## 2021-02-16 RX ORDER — TRIAMCINOLONE ACETONIDE 40 MG/ML
40 INJECTION, SUSPENSION INTRA-ARTICULAR; INTRAMUSCULAR
Status: COMPLETED | OUTPATIENT
Start: 2021-02-16 | End: 2021-02-16

## 2021-02-16 RX ADMIN — LIDOCAINE HYDROCHLORIDE 0.5 ML: 10 INJECTION, SOLUTION INFILTRATION; PERINEURAL at 14:24

## 2021-02-16 RX ADMIN — TRIAMCINOLONE ACETONIDE 40 MG: 40 INJECTION, SUSPENSION INTRA-ARTICULAR; INTRAMUSCULAR at 14:24

## 2021-02-16 NOTE — PROGRESS NOTES
Assessment/Plan:  1  Arthritis of carpometacarpal (CMC) joint of left thumb  Small joint arthrocentesis: L thumb Shell Valentin is a pleasant 48year old who presents here today for follow-up evaluation of his left thumb CMC arthritis  He did see good pain relief from his previous thumb CMC injection back in October 2020  A repeat injection was discussed and he was agreeable to this  Risks and benefits of the procedure were provided to the patient  He tolerated the injection without any complications  He will follow-up in 3 months for a repeat evaluation  Patient understood and had no further questions  Subjective:   Patient ID: Mario Orr is a 48 y o  male who presents here today for a follow-up evaluation of his left thumb CMC arthritis  He underwent a steroid injection on 10/13/2020  He notes that the steroid injection did provide him with a couple months of relief  He notes that he is complaint with wearing the thumb brace  Patient notes pain to the base of his thumb that is increased with pinching and gripping  He denies any numbness or tingling  HPI    Review of Systems   Constitutional: Negative for chills and fever  HENT: Negative for drooling and sneezing  Eyes: Negative for redness  Respiratory: Negative for cough and wheezing  Cardiovascular: Negative  Gastrointestinal: Negative for nausea and vomiting  Endocrine: Negative  Genitourinary: Negative  Musculoskeletal: Negative for arthralgias, joint swelling and myalgias  Skin: Negative  Allergic/Immunologic: Negative  Neurological: Negative for weakness  Hematological: Negative  Psychiatric/Behavioral: Negative for behavioral problems  The patient is not nervous/anxious            Past Medical History:   Diagnosis Date    Arthritis     left thumb    Kidney stone     right stone       Past Surgical History:   Procedure Laterality Date    EXTRACORPOREAL SHOCK WAVE LITHOTRIPSY      x2    HERNIA REPAIR Inguinal hernia repair, left     OTHER SURGICAL HISTORY N/A 01/11/1990    HAD SPLEEN REPAIR SURGERY, TRAUMATIC REPTURE REPAIR    [place] FRAGMENT KIDNEY STONE/ ESWL Right 12/11/2019    Procedure: LITHOTRIPSY EXTRACORPORAL SHOCKWAVE (ESWL); Surgeon: Stevo Garcia MD;  Location: [facility] MAIN OR;  Service: Urology    SPLENECTOMY, PARTIAL      repaired, traumatic       Family History   Problem Relation Age of Onset    Coronary artery disease Mother     Emphysema Mother         Emphysema/COPD    Skin cancer Mother     Emphysema Father         Emphysema/COPD    Heart attack Father         MI involving other coronary artery     Diabetes Paternal Uncle     Emphysema Family     Diabetes Family     Skin cancer Family     Nephrolithiasis Family        Social History     Occupational History    Not on file   Tobacco Use    Smoking status: Former Smoker     Packs/day: 0 50     Years: 20 00     Pack years: 10 00     Quit date: 12/4/2017     Years since quitting: 3 2    Smokeless tobacco: Former User    Tobacco comment: chew   Substance and Sexual Activity    Alcohol use: Not Currently     Comment: rarely    Drug use: No    Sexual activity: Not on file         Current Outpatient Medications:     magnesium citrate solution, Take 296 mL by mouth once, Disp: , Rfl:     traMADol (ULTRAM) 50 mg tablet, Take 1 tablet (50 mg total) by mouth every 12 (twelve) hours as needed for moderate pain, Disp: 60 tablet, Rfl: 2    Allergies   Allergen Reactions    Ezetimibe Rash    Levofloxacin Rash    Penicillins Rash     Unsure of reaction    Statins Myalgia       Objective:  Vitals:    02/16/21 1413   BP: 108/73   Pulse: 66       Ortho Exam   Left Thumb  TTP thumb CMC  Compartments soft  Brisk capillary refill  S/m intact median, radial, and ulnar nerve        Physical Exam  Vitals signs reviewed  Constitutional:       Appearance: He is well-developed  HENT:      Head: Normocephalic and atraumatic     Eyes: Conjunctiva/sclera: Conjunctivae normal       Pupils: Pupils are equal, round, and reactive to light  Neck:      Musculoskeletal: Normal range of motion and neck supple  Cardiovascular:      Rate and Rhythm: Normal rate  Pulmonary:      Effort: Pulmonary effort is normal  No respiratory distress  Musculoskeletal:      Comments: As noted in HPI   Skin:     General: Skin is warm and dry  Neurological:      Mental Status: He is alert and oriented to person, place, and time  Psychiatric:         Behavior: Behavior normal          I have personally reviewed pertinent films in PACS and my interpretation is as follows: x-ray's from 7/13/2020 demonstrates moderate left thumb CMC osteoarthritis  Small joint arthrocentesis: L thumb CMC  Independence Protocol:  Consent: Verbal consent obtained    Risks and benefits: risks, benefits and alternatives were discussed  Consent given by: patient  Site marked: the operative site was marked  Supporting Documentation  Indications: pain and joint swelling   Procedure Details  Location: thumb - L thumb CMC  Needle size: 27 G  Ultrasound guidance: no  Approach: dorsal  Medications administered: 0 5 mL lidocaine 1 %; 40 mg triamcinolone acetonide 40 mg/mL    Patient tolerance: patient tolerated the procedure well with no immediate complications  Dressing:  Sterile dressing applied

## 2021-02-16 NOTE — LETTER
February 16, 2021     Patient: Trinidad Pavon   YOB: 1970   Date of Visit: 2/16/2021       To Whom it May Concern:    Trinidad Pavon is under my professional care  He was seen in my office on 2/16/2021  If you have any questions or concerns, please don't hesitate to call           Sincerely,          Clarence Nissen, CRNP        CC: No Recipients

## 2021-02-16 NOTE — PROGRESS NOTES
ADULT ANNUAL PHYSICAL  [facility]    NAME: Nohemi Porter  AGE: 48 y o  SEX: male  : 1970     DATE: 2021     Assessment and Plan:     Diagnoses and all orders for this visit:    Health care maintenance    Colon cancer screening  -     Ambulatory referral to Gastroenterology; Future    Prostate cancer screening  -     PSA, Total Screen; Future    Screening, deficiency anemia, iron  -     CBC and Platelet; Future    Screening for diabetes mellitus  -     Comprehensive metabolic panel; Future    Screening for thyroid disorder  -     TSH, 3rd generation with Free T4 reflex; Future    Lipid screening    Hand arthropathy  -     Vitamin D 25 hydroxy; Future  -     CBC and Platelet; Future    Dyslipidemia  -     Lipid panel; Future    High risk medication use  -     Comprehensive metabolic panel; Future  -     Lipid panel; Future  -     TSH, 3rd generation with Free T4 reflex; Future  -     Vitamin D 25 hydroxy; Future  -     CBC and Platelet; Future    Vitamin D deficiency  -     Vitamin D 25 hydroxy; Future      51 yo male in good health    Health maintenance and preventative care screenings were discussed with patient today  Appropriate education was printed on patient's after visit summary  Counseling:  Dental Health: discussed importance of regular tooth brushing, flossing, and dental visits  Injury prevention: discussed safety/seat belts, safety helmets, smoke detectors, carbon dioxide detectors, and smoking near bedding or upholstery  BMI Counseling: Body mass index is 20 29 kg/m²  Discussed with patient's BMI with him  The BMI is in the acceptable range  · Sexual health: discussed sexually transmitted diseases, partner selection, use of condoms, avoidance of unintended pregnancy, and contraceptive alternatives  Return in about 6 months (around 2021)       Chief Complaint:     Chief Complaint   Patient presents with    Annual Exam      History of Present Illness:     Adult Annual Physical   Patient here for a comprehensive physical exam    The patient reports no problems - Denies chest pain, dizziness, syncope, dyspnea at rest   No significant fam h/o CAD  former smoker  quit 1 5 years ago  Diet and Physical Activity  · Diet/Nutrition: well balanced diet  · Weight concerns: none, patient's BMI is between 18 5-24 9  · Exercise: walking  Depression Screening  PHQ-9 Depression Screening    PHQ-9:   Frequency of the following problems over the past two weeks:      Little interest or pleasure in doing things: 0 - not at all  Feeling down, depressed, or hopeless: 0 - not at all  PHQ-2 Score: 0       General Health  · Sleep: sleeps well  · Hearing: normal - bilateral   · Vision: wears glasses  · Dental: regular dental visits   Health  · Erectile dysfunction: no      Review of Systems:     Review of Systems   Musculoskeletal: Positive for arthralgias and back pain  Ongoing left thumb arthritis  Having injection per Dr Caterina Cooper later today   All other systems reviewed and are negative  Past Medical History:     Past Medical History:   Diagnosis Date    Arthritis     left thumb    Kidney stone     right stone      Past Surgical History:     Past Surgical History:   Procedure Laterality Date    EXTRACORPOREAL SHOCK WAVE LITHOTRIPSY      x2    HERNIA REPAIR      Inguinal hernia repair, left     OTHER SURGICAL HISTORY N/A 01/11/1990    HAD SPLEEN REPAIR SURGERY, TRAUMATIC REPTURE REPAIR    [place] FRAGMENT KIDNEY STONE/ ESWL Right 12/11/2019    Procedure: LITHOTRIPSY EXTRACORPORAL SHOCKWAVE (ESWL);   Surgeon: Sanchez Jolly MD;  Location: [facility] OR;  Service: Urology    SPLENECTOMY, PARTIAL      repaired, traumatic      Social History:     Social History     Socioeconomic History    Marital status: /Civil Union     Spouse name: None    Number of children: None    Years of education: None    Highest education level: None   Occupational History    None   Social Needs    Financial resource strain: None    Food insecurity     Worry: None     Inability: None    Transportation needs     Medical: None     Non-medical: None   Tobacco Use    Smoking status: Former Smoker     Packs/day: 0 50     Years: 20 00     Pack years: 10 00     Quit date: 12/4/2017     Years since quitting: 3 2    Smokeless tobacco: Former User    Tobacco comment: chew   Substance and Sexual Activity    Alcohol use: Not Currently     Comment: rarely    Drug use: No    Sexual activity: None   Lifestyle    Physical activity     Days per week: None     Minutes per session: None    Stress: None   Relationships    Social connections     Talks on phone: None     Gets together: None     Attends Yazidi service: None     Active member of club or organization: None     Attends meetings of clubs or organizations: None     Relationship status: None    Intimate partner violence     Fear of current or ex partner: None     Emotionally abused: None     Physically abused: None     Forced sexual activity: None   Other Topics Concern    None   Social History Narrative    History of current every day smoker - as per [employer]    Former smoker - as per [employer]       Family History:     Family History   Problem Relation Age of Onset    Coronary artery disease Mother     Emphysema Mother         Emphysema/COPD    Skin cancer Mother     Emphysema Father         Emphysema/COPD    Heart attack Father         MI involving other coronary artery     Diabetes Paternal Uncle     Emphysema Family     Diabetes Family     Skin cancer Family     Nephrolithiasis Family       Current Medications:     Current Outpatient Medications   Medication Sig Dispense Refill    traMADol (ULTRAM) 50 mg tablet Take 1 tablet (50 mg total) by mouth every 12 (twelve) hours as needed for moderate pain 60 tablet 2     No current facility-administered medications for this visit  Allergies: Allergies   Allergen Reactions    Ezetimibe Rash    Levofloxacin Rash    Penicillins Rash     Unsure of reaction    Statins Myalgia      Objective:     /72 (BP Location: Left arm, Patient Position: Sitting, Cuff Size: Standard)   Pulse 65   Temp 98 2 °F (36 8 °C)   Resp 18   Ht 5' 10" (1 778 m)   Wt 64 1 kg (141 lb 6 4 oz)   SpO2 98%   BMI 20 29 kg/m²   Physical Exam  Vitals signs and nursing note reviewed  Constitutional:       General: He is not in acute distress  Appearance: Normal appearance  He is well-developed  HENT:      Head: Normocephalic and atraumatic  Eyes:      General: Lids are normal       Conjunctiva/sclera: Conjunctivae normal       Pupils: Pupils are equal, round, and reactive to light  Neck:      Thyroid: No thyromegaly  Vascular: No carotid bruit  Cardiovascular:      Rate and Rhythm: Normal rate and regular rhythm  Pulses: Normal pulses  Heart sounds: Normal heart sounds  No murmur  Pulmonary:      Effort: Pulmonary effort is normal  No respiratory distress  Breath sounds: Normal breath sounds  Abdominal:      General: Bowel sounds are normal  There is no distension  Palpations: Abdomen is soft  Tenderness: There is no abdominal tenderness  Genitourinary:     Comments: Decline exam  Musculoskeletal:         General: No deformity  Right lower leg: No edema  Left lower leg: No edema  Lymphadenopathy:      Cervical: No cervical adenopathy  Skin:     General: Skin is warm and dry  Capillary Refill: Capillary refill takes less than 2 seconds  Coloration: Skin is not pale  Neurological:      General: No focal deficit present  Mental Status: He is alert  Mental status is at baseline  Sensory: No sensory deficit  Motor: No weakness or abnormal muscle tone        Coordination: Coordination normal    Psychiatric:         Mood and Affect: Mood normal          Behavior: Behavior normal  Pulmonary Functions Testing Results:  Normal study     Health Maintenance:     Health Maintenance Topics with due status: Not Due       Topic Last Completion Date    DTaP,Tdap,and Td Vaccines 09/01/2017    Depression Screening PHQ 02/16/2021    BMI: Adult 02/16/2021     Health Maintenance Topics with due status: Overdue       Topic Date Due    Colorectal Cancer Screening 11/03/2020    Annual Physical 02/14/2021     Immunization History   Administered Date(s) Administered    Influenza, injectable, quadrivalent, preservative free 0 5 mL 11/04/2019    Tdap 09/01/2017       Marimar Stewart, [address] [id number]

## 2021-02-17 LAB
25(OH)D3+25(OH)D2 SERPL-MCNC: 23.5 NG/ML (ref 30–100)
ALBUMIN SERPL-MCNC: 4.6 G/DL (ref 4–5)
ALBUMIN/GLOB SERPL: 2.6 {RATIO} (ref 1.2–2.2)
ALP SERPL-CCNC: 31 IU/L (ref 39–117)
ALT SERPL-CCNC: 22 IU/L (ref 0–44)
AST SERPL-CCNC: 22 IU/L (ref 0–40)
BILIRUB SERPL-MCNC: 0.2 MG/DL (ref 0–1.2)
BUN SERPL-MCNC: 18 MG/DL (ref 6–24)
BUN/CREAT SERPL: 23 (ref 9–20)
CALCIUM SERPL-MCNC: 9.8 MG/DL (ref 8.7–10.2)
CHLORIDE SERPL-SCNC: 101 MMOL/L (ref 96–106)
CHOLEST SERPL-MCNC: 242 MG/DL (ref 100–199)
CO2 SERPL-SCNC: 25 MMOL/L (ref 20–29)
CREAT SERPL-MCNC: 0.77 MG/DL (ref 0.76–1.27)
ERYTHROCYTE [DISTWIDTH] IN BLOOD BY AUTOMATED COUNT: 12.6 % (ref 11.6–15.4)
GLOBULIN SER-MCNC: 1.8 G/DL (ref 1.5–4.5)
GLUCOSE SERPL-MCNC: 83 MG/DL (ref 65–99)
HCT VFR BLD AUTO: 38.8 % (ref 37.5–51)
HDLC SERPL-MCNC: 74 MG/DL
HGB BLD-MCNC: 13.1 G/DL (ref 13–17.7)
LDLC SERPL CALC-MCNC: 151 MG/DL (ref 0–99)
MCH RBC QN AUTO: 30.7 PG (ref 26.6–33)
MCHC RBC AUTO-ENTMCNC: 33.8 G/DL (ref 31.5–35.7)
MCV RBC AUTO: 91 FL (ref 79–97)
MICRODELETION SYND BLD/T FISH: NORMAL
PLATELET # BLD AUTO: 292 X10E3/UL (ref 150–450)
POTASSIUM SERPL-SCNC: 5.1 MMOL/L (ref 3.5–5.2)
PROT SERPL-MCNC: 6.4 G/DL (ref 6–8.5)
PSA SERPL-MCNC: 1.3 NG/ML (ref 0–4)
RBC # BLD AUTO: 4.27 X10E6/UL (ref 4.14–5.8)
SL AMB EGFR AFRICAN AMERICAN: 122 ML/MIN/1.73
SL AMB EGFR NON AFRICAN AMERICAN: 106 ML/MIN/1.73
SL AMB VLDL CHOLESTEROL CALC: 17 MG/DL (ref 5–40)
SODIUM SERPL-SCNC: 140 MMOL/L (ref 134–144)
TRIGL SERPL-MCNC: 99 MG/DL (ref 0–149)
TSH SERPL DL<=0.005 MIU/L-ACNC: 0.81 UIU/ML (ref 0.45–4.5)
WBC # BLD AUTO: 6.8 X10E3/UL (ref 3.4–10.8)

## 2021-02-26 DIAGNOSIS — M54.50 CHRONIC BILATERAL LOW BACK PAIN WITHOUT SCIATICA: ICD-10-CM

## 2021-02-26 DIAGNOSIS — G89.29 CHRONIC BILATERAL LOW BACK PAIN WITHOUT SCIATICA: ICD-10-CM

## 2021-02-27 RX ORDER — TRAMADOL HYDROCHLORIDE 50 MG/1
50 TABLET ORAL EVERY 12 HOURS PRN
Qty: 60 TABLET | Refills: 2 | Status: SHIPPED | OUTPATIENT
Start: 2021-02-27 | End: 2021-06-14 | Stop reason: SDUPTHER

## 2021-04-01 ENCOUNTER — TELEPHONE (OUTPATIENT)
Dept: GASTROENTEROLOGY | Facility: CLINIC | Age: 51
End: 2021-04-01

## 2021-04-01 DIAGNOSIS — Z12.11 SPECIAL SCREENING FOR MALIGNANT NEOPLASMS, COLON: Primary | ICD-10-CM

## 2021-04-01 RX ORDER — SODIUM, POTASSIUM,MAG SULFATES 17.5-3.13G
SOLUTION, RECONSTITUTED, ORAL ORAL
Qty: 2 BOTTLE | Refills: 0 | Status: SHIPPED | OUTPATIENT
Start: 2021-04-01 | End: 2021-05-03 | Stop reason: ALTCHOICE

## 2021-04-01 NOTE — TELEPHONE ENCOUNTER
Passed oa  Colonoscopy scheduled for 05/03/21 @[facility] with Dr Edilberto Echevarria instructions given to pt verbally/mailed  04/01/21  Screened by: Giacomo Farias    Referring Provider Terry    Pre- Screening: There is no height or weight on file to calculate BMI  BMI 20 23  Has patient been referred for a routine screening Colonoscopy? yes  Is the patient between 39-70 years old? yes      Previous Colonoscopy no   If yes:    Date:     Facility:     Reason:       SCHEDULING STAFF: If the patient is between 45yrs-49yrs, please advise patient to confirm benefits/coverage with their insurance company for a routine screening colonoscopy, some insurance carriers will only cover at [id number] or older  If the patient is over 66years old, please schedule an office visit  Does the patient want to see a Gastroenterologist prior to their procedure OR are they having any GI symptoms? no    Has the patient been hospitalized or had abdominal surgery in the past 6 months? no    Does the patient use supplemental oxygen? no    Does the patient take Coumadin, Lovenox, Plavix, Elliquis, Xarelto, or other blood thinning medication? no    Has the patient had a stroke, cardiac event, or stent placed in the past year? no    SCHEDULING STAFF: If patient answers NO to above questions, then schedule procedure  If patient answers YES to above questions, then schedule office appointment  If patient is between 45yrs - 49yrs, please advise patient that we will have to confirm benefits & coverage with their insurance company for a routine screening colonoscopy

## 2021-04-27 RX ORDER — DIPHENOXYLATE HYDROCHLORIDE AND ATROPINE SULFATE 2.5; .025 MG/1; MG/1
1 TABLET ORAL DAILY
COMMUNITY

## 2021-04-27 NOTE — PRE-PROCEDURE INSTRUCTIONS
Pre-Surgery Instructions:   Medication Instructions    multivitamin [facility]) TABS Patient was instructed by Physician and understands   traMADol (ULTRAM) 50 mg tablet Patient was instructed by Physician and understands

## 2021-04-28 DIAGNOSIS — U07.1 COVID-19: Primary | ICD-10-CM

## 2021-04-28 DIAGNOSIS — Z20.822 ENCOUNTER FOR LABORATORY TESTING FOR COVID-19 VIRUS: ICD-10-CM

## 2021-04-28 PROCEDURE — U0005 INFEC AGEN DETEC AMPLI PROBE: HCPCS

## 2021-04-28 PROCEDURE — U0003 INFECTIOUS AGENT DETECTION BY NUCLEIC ACID (DNA OR RNA); SEVERE ACUTE RESPIRATORY SYNDROME CORONAVIRUS 2 (SARS-COV-2) (CORONAVIRUS DISEASE [COVID-19]), AMPLIFIED PROBE TECHNIQUE, MAKING USE OF HIGH THROUGHPUT TECHNOLOGIES AS DESCRIBED BY CMS-2020-01-R: HCPCS

## 2021-04-29 LAB — SARS-COV-2 RNA RESP QL NAA+PROBE: NEGATIVE

## 2021-05-02 ENCOUNTER — ANESTHESIA EVENT (OUTPATIENT)
Dept: GASTROENTEROLOGY | Facility: AMBULARY SURGERY CENTER | Age: 51
End: 2021-05-02

## 2021-05-02 PROBLEM — Z98.890 PONV (POSTOPERATIVE NAUSEA AND VOMITING): Status: ACTIVE | Noted: 2021-05-02

## 2021-05-02 PROBLEM — R11.2 PONV (POSTOPERATIVE NAUSEA AND VOMITING): Status: ACTIVE | Noted: 2021-05-02

## 2021-05-02 NOTE — ANESTHESIA PREPROCEDURE EVALUATION
Procedure:  COLONOSCOPY    Relevant Problems   ANESTHESIA   (+) PONV (postoperative nausea and vomiting)      /RENAL   (+) Calculus of kidney      MUSCULOSKELETAL   (+) Arthritis   (+) Strain of muscle, fascia and tendon of lower back, initial encounter      Other   (+) Axillary lymphadenopathy   (+) Inguinal lymphadenopathy        Physical Exam    Airway    Mallampati score: II  TM Distance: >3 FB  Neck ROM: full     Dental       Cardiovascular  Rhythm: regular, Rate: normal,     Pulmonary  Breath sounds clear to auscultation,     Other Findings        Anesthesia Plan  ASA Score- 2     Anesthesia Type- IV sedation with anesthesia with ASA Monitors  Additional Monitors:   Airway Plan:           Plan Factors-    Chart reviewed  Patient is not a current smoker  Induction- intravenous  Postoperative Plan-     Informed Consent- Anesthetic plan and risks discussed with patient  I personally reviewed this patient with the CRNA  Discussed and agreed on the Anesthesia Plan with the CRNA  Yue Bryant

## 2021-05-03 ENCOUNTER — ANESTHESIA (OUTPATIENT)
Dept: GASTROENTEROLOGY | Facility: AMBULARY SURGERY CENTER | Age: 51
End: 2021-05-03

## 2021-05-03 ENCOUNTER — HOSPITAL ENCOUNTER (OUTPATIENT)
Dept: GASTROENTEROLOGY | Facility: AMBULARY SURGERY CENTER | Age: 51
Setting detail: OUTPATIENT SURGERY
Discharge: HOME | End: 2021-05-03
Attending: INTERNAL MEDICINE | Admitting: INTERNAL MEDICINE
Payer: COMMERCIAL

## 2021-05-03 VITALS
HEIGHT: 70 IN | HEART RATE: 76 BPM | DIASTOLIC BLOOD PRESSURE: 72 MMHG | TEMPERATURE: 97.7 F | SYSTOLIC BLOOD PRESSURE: 128 MMHG | RESPIRATION RATE: 20 BRPM | WEIGHT: 141 LBS | OXYGEN SATURATION: 100 % | BODY MASS INDEX: 20.19 KG/M2

## 2021-05-03 DIAGNOSIS — Z12.11 SPECIAL SCREENING FOR MALIGNANT NEOPLASMS, COLON: ICD-10-CM

## 2021-05-03 PROCEDURE — 88305 TISSUE EXAM BY PATHOLOGIST: CPT | Performed by: PATHOLOGY

## 2021-05-03 PROCEDURE — 45385 COLONOSCOPY W/LESION REMOVAL: CPT | Performed by: INTERNAL MEDICINE

## 2021-05-03 RX ORDER — LIDOCAINE HYDROCHLORIDE 10 MG/ML
INJECTION, SOLUTION EPIDURAL; INFILTRATION; INTRACAUDAL; PERINEURAL AS NEEDED
Status: DISCONTINUED | OUTPATIENT
Start: 2021-05-03 | End: 2021-05-03

## 2021-05-03 RX ORDER — SODIUM CHLORIDE, SODIUM LACTATE, POTASSIUM CHLORIDE, CALCIUM CHLORIDE 600; 310; 30; 20 MG/100ML; MG/100ML; MG/100ML; MG/100ML
75 INJECTION, SOLUTION INTRAVENOUS CONTINUOUS
Status: DISCONTINUED | OUTPATIENT
Start: 2021-05-03 | End: 2021-05-07 | Stop reason: HOSPADM

## 2021-05-03 RX ORDER — PROPOFOL 10 MG/ML
INJECTION, EMULSION INTRAVENOUS CONTINUOUS PRN
Status: DISCONTINUED | OUTPATIENT
Start: 2021-05-03 | End: 2021-05-03

## 2021-05-03 RX ORDER — PROPOFOL 10 MG/ML
INJECTION, EMULSION INTRAVENOUS AS NEEDED
Status: DISCONTINUED | OUTPATIENT
Start: 2021-05-03 | End: 2021-05-03

## 2021-05-03 RX ADMIN — LIDOCAINE HYDROCHLORIDE 50 MG: 10 INJECTION, SOLUTION EPIDURAL; INFILTRATION; INTRACAUDAL; PERINEURAL at 12:32

## 2021-05-03 RX ADMIN — PROPOFOL 30 MG: 10 INJECTION, EMULSION INTRAVENOUS at 12:38

## 2021-05-03 RX ADMIN — PROPOFOL 100 MG: 10 INJECTION, EMULSION INTRAVENOUS at 12:32

## 2021-05-03 RX ADMIN — PROPOFOL 30 MG: 10 INJECTION, EMULSION INTRAVENOUS at 12:39

## 2021-05-03 RX ADMIN — SODIUM CHLORIDE, SODIUM LACTATE, POTASSIUM CHLORIDE, AND CALCIUM CHLORIDE 75 ML/HR: .6; .31; .03; .02 INJECTION, SOLUTION INTRAVENOUS at 12:11

## 2021-05-03 RX ADMIN — PROPOFOL 150 MCG/KG/MIN: 10 INJECTION, EMULSION INTRAVENOUS at 12:32

## 2021-05-03 RX ADMIN — PROPOFOL 50 MG: 10 INJECTION, EMULSION INTRAVENOUS at 12:33

## 2021-05-03 NOTE — H&P
History and Physical - [facility]  Zari Doran 48 y o  male MRN: [id number]    HPI: Zari Doran is a 48y o  year old male who presents for colon cancer screening  Patient has never had a colonoscopy, no family history of colon cancer  Review of Systems    Historical Information   Past Medical History:   Diagnosis Date    Arthritis     left thumb    Kidney stone     right stone    PONV (postoperative nausea and vomiting) 2019    bad after ESWL     Past Surgical History:   Procedure Laterality Date    EXTRACORPOREAL SHOCK WAVE LITHOTRIPSY      x2    EXTRACORPOREAL SHOCK WAVE LITHOTRIPSY      x3 times total    HERNIA REPAIR      Inguinal hernia repair, left     OTHER SURGICAL HISTORY N/A 01/11/1990    HAD SPLEEN REPAIR SURGERY, TRAUMATIC REPTURE REPAIR    [place] FRAGMENT KIDNEY STONE/ ESWL Right 12/11/2019    Procedure: LITHOTRIPSY EXTRACORPORAL SHOCKWAVE (ESWL);   Surgeon: Lorrayne Cooks, MD;  Location: [facility] MAIN OR;  Service: Urology    SPLENECTOMY, PARTIAL  1990    repaired, traumatic     Social History   Social History     Substance and Sexual Activity   Alcohol Use Yes    Frequency: Monthly or less    Comment: rarely     Social History     Substance and Sexual Activity   Drug Use No     Social History     Tobacco Use   Smoking Status Former Smoker    Packs/day: 0 50    Years: 20 00    Pack years: 10 00    Quit date: 12/4/2017    Years since quitting: 3 4   Smokeless Tobacco Former User   Tobacco Comment    chew     Family History   Problem Relation Age of Onset    Coronary artery disease Mother     Emphysema Mother         Emphysema/COPD    Skin cancer Mother     Emphysema Father         Emphysema/COPD    Heart attack Father         MI involving other coronary artery     Diabetes Paternal Uncle     Emphysema Family     Diabetes Family     Skin cancer Family     Nephrolithiasis Family        Meds/Allergies     (Not in a hospital admission)      Allergies Allergen Reactions    Ezetimibe Rash    Levofloxacin Rash    Penicillins Rash     Unsure of reaction    Statins Myalgia       Objective     /77   Pulse 66   Temp 97 7 °F (36 5 °C) (Temporal)   Resp 20   Ht 5' 10" (1 778 m)   Wt 64 kg (141 lb)   SpO2 98%   BMI 20 23 kg/m²       PHYSICAL EXAM    Gen: NAD  CV: RRR  CHEST: Clear  ABD: soft, NT/ND  EXT: no edema  Neuro: AAO      ASSESSMENT/PLAN:  This is a 48y o  year old male here for colon cancer screening    Average risk screening colonoscopy    PLAN:   Procedure:  Colonoscopy

## 2021-05-10 ENCOUNTER — TELEPHONE (OUTPATIENT)
Dept: GASTROENTEROLOGY | Facility: AMBULARY SURGERY CENTER | Age: 51
End: 2021-05-10

## 2021-05-10 NOTE — TELEPHONE ENCOUNTER
----- Message from Crissy Austin MD sent at 5/8/2021  2:15 PM EDT -----  Please inform the patient that 1 of the polyps was sessile serrated adenoma meaning a precancerous polyp, the other was benign  Would recommend repeat colonoscopy in 5 years

## 2021-05-27 ENCOUNTER — OFFICE VISIT (OUTPATIENT)
Dept: OBGYN CLINIC | Facility: CLINIC | Age: 51
End: 2021-05-27
Payer: COMMERCIAL

## 2021-05-27 VITALS
HEART RATE: 62 BPM | BODY MASS INDEX: 20.19 KG/M2 | WEIGHT: 141 LBS | HEIGHT: 70 IN | SYSTOLIC BLOOD PRESSURE: 110 MMHG | DIASTOLIC BLOOD PRESSURE: 72 MMHG

## 2021-05-27 DIAGNOSIS — M18.12 ARTHRITIS OF CARPOMETACARPAL (CMC) JOINT OF LEFT THUMB: Primary | ICD-10-CM

## 2021-05-27 PROCEDURE — 1036F TOBACCO NON-USER: CPT | Performed by: ORTHOPAEDIC SURGERY

## 2021-05-27 PROCEDURE — 3008F BODY MASS INDEX DOCD: CPT | Performed by: ORTHOPAEDIC SURGERY

## 2021-05-27 PROCEDURE — 20600 DRAIN/INJ JOINT/BURSA W/O US: CPT | Performed by: ORTHOPAEDIC SURGERY

## 2021-05-27 PROCEDURE — 99213 OFFICE O/P EST LOW 20 MIN: CPT | Performed by: ORTHOPAEDIC SURGERY

## 2021-05-27 RX ORDER — LIDOCAINE HYDROCHLORIDE 10 MG/ML
0.5 INJECTION, SOLUTION INFILTRATION; PERINEURAL
Status: COMPLETED | OUTPATIENT
Start: 2021-05-27 | End: 2021-05-27

## 2021-05-27 RX ORDER — TRIAMCINOLONE ACETONIDE 40 MG/ML
20 INJECTION, SUSPENSION INTRA-ARTICULAR; INTRAMUSCULAR
Status: COMPLETED | OUTPATIENT
Start: 2021-05-27 | End: 2021-05-27

## 2021-05-27 RX ADMIN — LIDOCAINE HYDROCHLORIDE 0.5 ML: 10 INJECTION, SOLUTION INFILTRATION; PERINEURAL at 14:00

## 2021-05-27 RX ADMIN — TRIAMCINOLONE ACETONIDE 20 MG: 40 INJECTION, SUSPENSION INTRA-ARTICULAR; INTRAMUSCULAR at 14:00

## 2021-05-27 NOTE — LETTER
May 27, 2021     Patient: Jose Rojas   YOB: 1970   Date of Visit: 5/27/2021       To Whom it May Concern:    Jose Rojas is under my professional care  He was seen in my office on 5/27/2021  If you have any questions or concerns, please don't hesitate to call           Sincerely,          Monika Hdz, DO

## 2021-05-27 NOTE — PROGRESS NOTES
Assessment/Plan:  1  Arthritis of carpometacarpal (CMC) joint of left thumb  Small joint arthrocentesis: L thumb CMC       Scribe Attestation    I,:  Fanta Macias MA am acting as a scribe while in the presence of the attending physician :       I,:  Simon Herrera DO personally performed the services described in this documentation    as scribed in my presence  :             59-year-old male with left thumb CMC arthritis  He did see good relief from the previous injection  Treatment options were discussed in the form of a repeat steroid injection  He was agreeable to this  He consented and underwent a left thumb CMC injection in the office today today without any complications  He may follow up with me as needed  Subjective:   Napoleon Breen is a 48 y o  male who presents to the office today for follow up evaluation left thumb CMC arthritis  Patient underwent a steroid injection at his last visit on 2/16/21 which he states provided him with good relief  Patient states his pain began to return appx 2 weeks ago  He notes pain to the base of his thumb  He states this is increased with pinch and   Review of Systems   Constitutional: Negative for chills and fever  HENT: Negative for drooling and sneezing  Eyes: Negative for redness  Respiratory: Negative for cough and wheezing  Gastrointestinal: Negative for nausea and vomiting  Musculoskeletal: Negative for arthralgias, joint swelling and myalgias  Neurological: Negative for weakness and numbness  Psychiatric/Behavioral: Negative for behavioral problems  The patient is not nervous/anxious            Past Medical History:   Diagnosis Date    Arthritis     left thumb    Kidney stone     right stone    PONV (postoperative nausea and vomiting) 2019    bad after ESWL       Past Surgical History:   Procedure Laterality Date    EXTRACORPOREAL SHOCK WAVE LITHOTRIPSY      x2    EXTRACORPOREAL SHOCK WAVE LITHOTRIPSY      x3 times total    HERNIA REPAIR      Inguinal hernia repair, left     OTHER SURGICAL HISTORY N/A 01/11/1990    HAD SPLEEN REPAIR SURGERY, TRAUMATIC REPTURE REPAIR    [place] FRAGMENT KIDNEY STONE/ ESWL Right 12/11/2019    Procedure: LITHOTRIPSY EXTRACORPORAL SHOCKWAVE (ESWL); Surgeon: Mena Marshall MD;  Location: [facility] MAIN OR;  Service: Urology    SPLENECTOMY, PARTIAL  1990    repaired, traumatic       Family History   Problem Relation Age of Onset    Coronary artery disease Mother     Emphysema Mother         Emphysema/COPD    Skin cancer Mother     Emphysema Father         Emphysema/COPD    Heart attack Father         MI involving other coronary artery     Diabetes Paternal Uncle     Emphysema Family     Diabetes Family     Skin cancer Family     Nephrolithiasis Family        Social History     Occupational History    Not on file   Tobacco Use    Smoking status: Former Smoker     Packs/day: 0 50     Years: 20 00     Pack years: 10 00     Quit date: 12/4/2017     Years since quitting: 3 4    Smokeless tobacco: Former User    Tobacco comment: chew   Substance and Sexual Activity    Alcohol use: Yes     Frequency: Monthly or less     Comment: rarely    Drug use: No    Sexual activity: Not on file         Current Outpatient Medications:     multivitamin (THERAGRAN) TABS, Take 1 tablet by mouth daily, Disp: , Rfl:     traMADol (ULTRAM) 50 mg tablet, Take 1 tablet (50 mg total) by mouth every 12 (twelve) hours as needed for moderate pain, Disp: 60 tablet, Rfl: 2    Allergies   Allergen Reactions    Ezetimibe Rash    Levofloxacin Rash    Penicillins Rash     Unsure of reaction    Statins Myalgia       Objective:  Vitals:    05/27/21 1347   BP: 110/72   Pulse: 62       Ortho Exam     Left thumb    TTP thumb CMC  + grind   Compartments soft  Brisk capillary refill  S/m intact median, radial, and ulnar nerve     Physical Exam  Constitutional:       Appearance: He is well-developed     HENT:      Head: Normocephalic and atraumatic  Eyes:      General:         Right eye: No discharge  Left eye: No discharge  Conjunctiva/sclera: Conjunctivae normal    Neck:      Musculoskeletal: Normal range of motion and neck supple  Cardiovascular:      Rate and Rhythm: Normal rate  Pulmonary:      Effort: Pulmonary effort is normal  No respiratory distress  Musculoskeletal:      Comments: As noted in HPI   Skin:     General: Skin is warm and dry  Neurological:      Mental Status: He is alert and oriented to person, place, and time  Psychiatric:         Behavior: Behavior normal          Thought Content: Thought content normal          Judgment: Judgment normal      Small joint arthrocentesis: L thumb CMC  [place] Protocol:  Consent: Verbal consent obtained    Consent given by: patient  Patient identity confirmed: verbally with patient    Supporting Documentation  Indications: pain   Procedure Details  Location: thumb - L thumb CMC  Preparation: Patient was prepped and draped in the usual sterile fashion  Needle size: 27 G  Ultrasound guidance: no  Medications administered: 0 5 mL lidocaine 1 %; 20 mg triamcinolone acetonide 40 mg/mL    Patient tolerance: patient tolerated the procedure well with no immediate complications  Dressing:  Sterile dressing applied

## 2021-06-03 ENCOUNTER — TELEPHONE (OUTPATIENT)
Dept: OBGYN CLINIC | Facility: HOSPITAL | Age: 51
End: 2021-06-03

## 2021-06-03 NOTE — TELEPHONE ENCOUNTER
Patient sees Dr Tona Edouard  Patient called because he was seen on 5/27 and had a cortisone injection in Left thumb  The injection did not work on his thumb as he expected and he would like some advise on what to do  Please call back to # [phone]  Thank you

## 2021-06-03 NOTE — TELEPHONE ENCOUNTER
Spoke to patient  He stated he did get relief from the injection but it took a little longer than usual  Now he has relief  Advised that the injections can take up to 2 weeks for effectiveness  Should continue brace and can use voltren gel OTC as directed on the box  Any other questions or concerns call back  Verbalized understanding

## 2021-06-14 DIAGNOSIS — G89.29 CHRONIC BILATERAL LOW BACK PAIN WITHOUT SCIATICA: ICD-10-CM

## 2021-06-14 DIAGNOSIS — M54.50 CHRONIC BILATERAL LOW BACK PAIN WITHOUT SCIATICA: ICD-10-CM

## 2021-06-14 RX ORDER — TRAMADOL HYDROCHLORIDE 50 MG/1
50 TABLET ORAL EVERY 12 HOURS PRN
Qty: 60 TABLET | Refills: 2 | Status: SHIPPED | OUTPATIENT
Start: 2021-06-14 | End: 2021-09-14 | Stop reason: SDUPTHER

## 2021-09-14 DIAGNOSIS — G89.29 CHRONIC BILATERAL LOW BACK PAIN WITHOUT SCIATICA: ICD-10-CM

## 2021-09-14 DIAGNOSIS — M54.50 CHRONIC BILATERAL LOW BACK PAIN WITHOUT SCIATICA: ICD-10-CM

## 2021-09-14 RX ORDER — TRAMADOL HYDROCHLORIDE 50 MG/1
50 TABLET ORAL EVERY 12 HOURS PRN
Qty: 60 TABLET | Refills: 0 | Status: SHIPPED | OUTPATIENT
Start: 2021-09-14 | End: 2021-10-17 | Stop reason: SDUPTHER

## 2021-09-16 ENCOUNTER — OFFICE VISIT (OUTPATIENT)
Dept: OBGYN CLINIC | Facility: CLINIC | Age: 51
End: 2021-09-16
Payer: COMMERCIAL

## 2021-09-16 VITALS
HEART RATE: 62 BPM | WEIGHT: 149 LBS | BODY MASS INDEX: 21.33 KG/M2 | SYSTOLIC BLOOD PRESSURE: 106 MMHG | DIASTOLIC BLOOD PRESSURE: 66 MMHG | HEIGHT: 70 IN

## 2021-09-16 DIAGNOSIS — M18.12 ARTHRITIS OF CARPOMETACARPAL (CMC) JOINT OF LEFT THUMB: Primary | ICD-10-CM

## 2021-09-16 PROCEDURE — 1036F TOBACCO NON-USER: CPT | Performed by: ORTHOPAEDIC SURGERY

## 2021-09-16 PROCEDURE — 20600 DRAIN/INJ JOINT/BURSA W/O US: CPT | Performed by: ORTHOPAEDIC SURGERY

## 2021-09-16 PROCEDURE — 99213 OFFICE O/P EST LOW 20 MIN: CPT | Performed by: ORTHOPAEDIC SURGERY

## 2021-09-16 PROCEDURE — 3008F BODY MASS INDEX DOCD: CPT | Performed by: ORTHOPAEDIC SURGERY

## 2021-09-16 RX ORDER — TRIAMCINOLONE ACETONIDE 40 MG/ML
20 INJECTION, SUSPENSION INTRA-ARTICULAR; INTRAMUSCULAR
Status: COMPLETED | OUTPATIENT
Start: 2021-09-16 | End: 2021-09-16

## 2021-09-16 RX ORDER — LIDOCAINE HYDROCHLORIDE 10 MG/ML
0.5 INJECTION, SOLUTION INFILTRATION; PERINEURAL
Status: COMPLETED | OUTPATIENT
Start: 2021-09-16 | End: 2021-09-16

## 2021-09-16 RX ADMIN — TRIAMCINOLONE ACETONIDE 20 MG: 40 INJECTION, SUSPENSION INTRA-ARTICULAR; INTRAMUSCULAR at 10:01

## 2021-09-16 RX ADMIN — LIDOCAINE HYDROCHLORIDE 0.5 ML: 10 INJECTION, SOLUTION INFILTRATION; PERINEURAL at 10:01

## 2021-09-16 NOTE — PROGRESS NOTES
Assessment/Plan:  1  Arthritis of carpometacarpal (CMC) joint of left thumb  Small joint arthrocentesis: L thumb CMC       Scribe Attestation    I,:  Fanta Macias MA am acting as a scribe while in the presence of the attending physician :       I,:  Juid Schwartz DO personally performed the services described in this documentation    as scribed in my presence  :             80-year-old male with left thumb CMC arthritis  He did see good relief from the previous steroid injection  A repeat injection was discussed  He was agreeable to this  He consented and underwent a left thumb CMC injection in the office today without any complications  He will continue with the comfort cool brace  He may follow up with me as needed  Subjective:   Gaby Vaughn is a 48 y o  male who presents to the office today for follow up evaluation left thumb CMC arthritis  Patient underwent a steroid injection at his last visit on 5/27/21 which he states did provide him with good relief  Patient states the pain has began to return  He notes increased pain with activity  He does use the comfort cool brace which he states does provide him with relief  Review of Systems   Constitutional: Negative for chills and fever  HENT: Negative for drooling and sneezing  Eyes: Negative for redness  Respiratory: Negative for cough and wheezing  Gastrointestinal: Negative for nausea and vomiting  Musculoskeletal: Negative for arthralgias, joint swelling and myalgias  Neurological: Negative for weakness and numbness  Psychiatric/Behavioral: Negative for behavioral problems  The patient is not nervous/anxious            Past Medical History:   Diagnosis Date    Arthritis     left thumb    Kidney stone     right stone    PONV (postoperative nausea and vomiting) 2019    bad after ESWL       Past Surgical History:   Procedure Laterality Date    EXTRACORPOREAL SHOCK WAVE LITHOTRIPSY      x2    EXTRACORPOREAL SHOCK WAVE LITHOTRIPSY x3 times total    HERNIA REPAIR      Inguinal hernia repair, left     OTHER SURGICAL HISTORY N/A 01/11/1990    HAD SPLEEN REPAIR SURGERY, TRAUMATIC REPTURE REPAIR    [place] FRAGMENT KIDNEY STONE/ ESWL Right 12/11/2019    Procedure: LITHOTRIPSY EXTRACORPORAL SHOCKWAVE (ESWL);   Surgeon: Jc Bustos MD;  Location: [facility] MAIN OR;  Service: Urology    SPLENECTOMY, PARTIAL  1990    repaired, traumatic       Family History   Problem Relation Age of Onset    Coronary artery disease Mother     Emphysema Mother         Emphysema/COPD    Skin cancer Mother     Emphysema Father         Emphysema/COPD    Heart attack Father         MI involving other coronary artery     Diabetes Paternal Uncle     Emphysema Family     Diabetes Family     Skin cancer Family     Nephrolithiasis Family        Social History     Occupational History    Not on file   Tobacco Use    Smoking status: Former Smoker     Packs/day: 0 50     Years: 20 00     Pack years: 10 00     Quit date: 12/4/2017     Years since quitting: 3 7    Smokeless tobacco: Former User    Tobacco comment: chew   Vaping Use    Vaping Use: Never used   Substance and Sexual Activity    Alcohol use: Yes     Comment: rarely    Drug use: No    Sexual activity: Not on file         Current Outpatient Medications:     multivitamin (THERAGRAN) TABS, Take 1 tablet by mouth daily, Disp: , Rfl:     traMADol (ULTRAM) 50 mg tablet, Take 1 tablet (50 mg total) by mouth every 12 (twelve) hours as needed for moderate pain, Disp: 60 tablet, Rfl: 0    Allergies   Allergen Reactions    Ezetimibe Rash    Levofloxacin Rash    Penicillins Rash     Unsure of reaction    Statins Myalgia       Objective:  Vitals:    09/16/21 0956   BP: 106/66   Pulse: 62       Ortho Exam     Left thumb    TTP thumb CMC  + grind  No deformity   Compartments soft  Brisk capillary refill  S/m intact median, radial, and ulnar nerve     Physical Exam  Constitutional:       Appearance: He is well-developed  HENT:      Head: Normocephalic and atraumatic  Eyes:      General:         Right eye: No discharge  Left eye: No discharge  Conjunctiva/sclera: Conjunctivae normal    Cardiovascular:      Rate and Rhythm: Normal rate  Pulmonary:      Effort: Pulmonary effort is normal  No respiratory distress  Musculoskeletal:      Cervical back: Normal range of motion and neck supple  Comments: As noted in HPI   Skin:     General: Skin is warm and dry  Neurological:      Mental Status: He is alert and oriented to person, place, and time  Psychiatric:         Behavior: Behavior normal          Thought Content: Thought content normal          Judgment: Judgment normal      Small joint arthrocentesis: L thumb CMC  [place] Protocol:  Consent: Verbal consent obtained    Consent given by: patient  Patient identity confirmed: verbally with patient    Supporting Documentation  Indications: pain   Procedure Details  Location: thumb - L thumb CMC  Preparation: Patient was prepped and draped in the usual sterile fashion  Needle size: 27 G  Ultrasound guidance: no  Medications administered: 0 5 mL lidocaine 1 %; 20 mg triamcinolone acetonide 40 mg/mL    Patient tolerance: patient tolerated the procedure well with no immediate complications  Dressing:  Sterile dressing applied

## 2021-10-17 DIAGNOSIS — M54.50 CHRONIC BILATERAL LOW BACK PAIN WITHOUT SCIATICA: ICD-10-CM

## 2021-10-17 DIAGNOSIS — G89.29 CHRONIC BILATERAL LOW BACK PAIN WITHOUT SCIATICA: ICD-10-CM

## 2021-10-18 RX ORDER — TRAMADOL HYDROCHLORIDE 50 MG/1
50 TABLET ORAL EVERY 12 HOURS PRN
Qty: 60 TABLET | Refills: 0 | Status: SHIPPED | OUTPATIENT
Start: 2021-10-18 | End: 2021-11-30 | Stop reason: SDUPTHER

## 2021-11-15 ENCOUNTER — TELEMEDICINE (OUTPATIENT)
Dept: FAMILY MEDICINE CLINIC | Facility: CLINIC | Age: 51
End: 2021-11-15
Payer: COMMERCIAL

## 2021-11-15 DIAGNOSIS — Z20.822 CLOSE EXPOSURE TO COVID-19 VIRUS: Primary | ICD-10-CM

## 2021-11-15 PROCEDURE — U0003 INFECTIOUS AGENT DETECTION BY NUCLEIC ACID (DNA OR RNA); SEVERE ACUTE RESPIRATORY SYNDROME CORONAVIRUS 2 (SARS-COV-2) (CORONAVIRUS DISEASE [COVID-19]), AMPLIFIED PROBE TECHNIQUE, MAKING USE OF HIGH THROUGHPUT TECHNOLOGIES AS DESCRIBED BY CMS-2020-01-R: HCPCS | Performed by: NURSE PRACTITIONER

## 2021-11-15 PROCEDURE — 1036F TOBACCO NON-USER: CPT | Performed by: NURSE PRACTITIONER

## 2021-11-15 PROCEDURE — 99213 OFFICE O/P EST LOW 20 MIN: CPT | Performed by: NURSE PRACTITIONER

## 2021-11-15 PROCEDURE — U0005 INFEC AGEN DETEC AMPLI PROBE: HCPCS | Performed by: NURSE PRACTITIONER

## 2021-11-30 DIAGNOSIS — M54.50 CHRONIC BILATERAL LOW BACK PAIN WITHOUT SCIATICA: ICD-10-CM

## 2021-11-30 DIAGNOSIS — G89.29 CHRONIC BILATERAL LOW BACK PAIN WITHOUT SCIATICA: ICD-10-CM

## 2021-12-01 RX ORDER — TRAMADOL HYDROCHLORIDE 50 MG/1
50 TABLET ORAL EVERY 12 HOURS PRN
Qty: 60 TABLET | Refills: 0 | Status: SHIPPED | OUTPATIENT
Start: 2021-12-01 | End: 2022-01-03 | Stop reason: SDUPTHER

## 2022-01-03 DIAGNOSIS — M54.50 CHRONIC BILATERAL LOW BACK PAIN WITHOUT SCIATICA: ICD-10-CM

## 2022-01-03 DIAGNOSIS — G89.29 CHRONIC BILATERAL LOW BACK PAIN WITHOUT SCIATICA: ICD-10-CM

## 2022-01-03 RX ORDER — TRAMADOL HYDROCHLORIDE 50 MG/1
50 TABLET ORAL EVERY 12 HOURS PRN
Qty: 60 TABLET | Refills: 0 | Status: SHIPPED | OUTPATIENT
Start: 2022-01-03 | End: 2022-02-09 | Stop reason: SDUPTHER

## 2022-02-10 ENCOUNTER — OFFICE VISIT (OUTPATIENT)
Dept: OBGYN CLINIC | Facility: CLINIC | Age: 52
End: 2022-02-10
Payer: COMMERCIAL

## 2022-02-10 VITALS
SYSTOLIC BLOOD PRESSURE: 111 MMHG | WEIGHT: 155 LBS | BODY MASS INDEX: 22.19 KG/M2 | HEART RATE: 74 BPM | DIASTOLIC BLOOD PRESSURE: 70 MMHG | HEIGHT: 70 IN

## 2022-02-10 DIAGNOSIS — M18.12 ARTHRITIS OF CARPOMETACARPAL (CMC) JOINT OF LEFT THUMB: Primary | ICD-10-CM

## 2022-02-10 PROCEDURE — 20600 DRAIN/INJ JOINT/BURSA W/O US: CPT | Performed by: ORTHOPAEDIC SURGERY

## 2022-02-10 PROCEDURE — 99213 OFFICE O/P EST LOW 20 MIN: CPT | Performed by: ORTHOPAEDIC SURGERY

## 2022-02-10 RX ORDER — TRIAMCINOLONE ACETONIDE 40 MG/ML
20 INJECTION, SUSPENSION INTRA-ARTICULAR; INTRAMUSCULAR
Status: COMPLETED | OUTPATIENT
Start: 2022-02-10 | End: 2022-02-10

## 2022-02-10 RX ORDER — LIDOCAINE HYDROCHLORIDE 10 MG/ML
0.5 INJECTION, SOLUTION INFILTRATION; PERINEURAL
Status: COMPLETED | OUTPATIENT
Start: 2022-02-10 | End: 2022-02-10

## 2022-02-10 RX ADMIN — LIDOCAINE HYDROCHLORIDE 0.5 ML: 10 INJECTION, SOLUTION INFILTRATION; PERINEURAL at 08:23

## 2022-02-10 RX ADMIN — TRIAMCINOLONE ACETONIDE 20 MG: 40 INJECTION, SUSPENSION INTRA-ARTICULAR; INTRAMUSCULAR at 08:23

## 2022-02-10 NOTE — PROGRESS NOTES
Assessment/Plan:  1  Arthritis of carpometacarpal [place]) joint of left thumb         46year old male with left thumb CMC arthritis  We again discussed the use of repeat steroid injections for this and that there is potential over time for them to provide less and less relief  Patient states understanding and would like to proceed with repeat injection today  The left thumb CMC was prepped and steroid injected into the joint  Patient tolerated the procedure well  He should continue with use of his comfort cool and Voltaren Gel PRN  Follow up PRN  Subjective:   Mikayla Davis is a 46 y o  male who presents to the office today for follow up evaluation of left thumb CMC arthritis  Patient received a steroid injection at his last visit on 09/16/21 and states that this worked well for him until just about 1 5 weeks ago  He states he will also use the comfort cool brace and Voltaren Gel as needed  States his pain is worse with activity and at the end of the day  Patient is an   Review of Systems   All other systems reviewed and are negative  Past Medical History:   Diagnosis Date    Arthritis     left thumb    Kidney stone     right stone    PONV (postoperative nausea and vomiting) 2019    bad after ESWL       Past Surgical History:   Procedure Laterality Date    EXTRACORPOREAL SHOCK WAVE LITHOTRIPSY      x2    EXTRACORPOREAL SHOCK WAVE LITHOTRIPSY      x3 times total    HERNIA REPAIR      Inguinal hernia repair, left     OTHER SURGICAL HISTORY N/A 01/11/1990    HAD SPLEEN REPAIR SURGERY, TRAUMATIC REPTURE REPAIR    [place] FRAGMENT KIDNEY STONE/ ESWL Right 12/11/2019    Procedure: LITHOTRIPSY EXTRACORPORAL SHOCKWAVE (ESWL);   Surgeon: Tony Finch MD;  [facility] MAIN OR;  Service: Urology    SPLENECTOMY, PARTIAL  1990    repaired, traumatic       Family History   Problem Relation Age of Onset    Coronary artery disease Mother     Emphysema Mother         Emphysema/COPD    Skin cancer Mother     Emphysema Father         Emphysema/COPD    Heart attack Father         MI involving other coronary artery     Diabetes Paternal Uncle     Emphysema Family     Diabetes Family     Skin cancer Family     Nephrolithiasis Family        Social History     Occupational History    Not on file   Tobacco Use    Smoking status: Former Smoker     Packs/day: 0 50     Years: 20 00     Pack years: 10 00     Quit date: 2017     Years since quittin 1    Smokeless tobacco: Former User    Tobacco comment: chew   Vaping Use    Vaping Use: Never used   Substance and Sexual Activity    Alcohol use: Yes     Comment: rarely    Drug use: No    Sexual activity: Not on file         Current Outpatient Medications:     multivitamin (THERAGRAN) TABS, Take 1 tablet by mouth daily, Disp: , Rfl:     traMADol (ULTRAM) 50 mg tablet, Take 1 tablet (50 mg total) by mouth every 12 (twelve) hours as needed for moderate pain, Disp: 60 tablet, Rfl: 2    Allergies   Allergen Reactions    Ezetimibe Rash    Levofloxacin Rash    Penicillins Rash     Unsure of reaction    Statins Myalgia       Objective:  Vitals:    02/10/22 0807   BP: 111/70   Pulse: 74       Ortho Exam   Left Thumb:  TTP thumb CMC joint   + grind  No MP joint hyperextension  SILT radial and ulnar thumb  Brisk capillary refill  Physical Exam  Vitals reviewed  Constitutional:       General: He is not in acute distress  Appearance: Normal appearance  HENT:      Head: Normocephalic and atraumatic  Eyes:      Extraocular Movements: Extraocular movements intact  Conjunctiva/sclera: Conjunctivae normal    Cardiovascular:      Pulses: Normal pulses  Pulmonary:      Effort: Pulmonary effort is normal  No respiratory distress  Abdominal:      Tenderness: There is no guarding  Musculoskeletal:      Cervical back: No rigidity  Skin:     General: Skin is warm and dry     Neurological:      Mental Status: He is alert and oriented to person, place, and time  Psychiatric:         Mood and Affect: Mood normal          Behavior: Behavior normal          Thought Content: Thought content normal          Studies Reviewed:  I have personally reviewed pertinent films in PACS and my interpretation is as follows: No new images today     Procedures Performed:  Small joint arthrocentesis: L thumb CMC  [place] Protocol:  Consent: Verbal consent obtained  Risks and benefits: risks, benefits and alternatives were discussed  Consent given by: patient  Time out: Immediately prior to procedure a "time out" was called to verify the correct patient, procedure, equipment, support staff and site/side marked as required    Patient understanding: patient states understanding of the procedure being performed  Patient identity confirmed: verbally with patient    Supporting Documentation  Indications: pain   Procedure Details  Location: thumb - L thumb CMC  Needle size: 25 G  Ultrasound guidance: no  Approach: volar  Medications administered: 0 5 mL lidocaine 1 %; 20 mg triamcinolone acetonide 40 mg/mL    Patient tolerance: patient tolerated the procedure well with no immediate complications  Dressing:  Sterile dressing applied

## 2022-02-11 ENCOUNTER — RA CDI HCC (OUTPATIENT)
Dept: OTHER | Facility: HOSPITAL | Age: 52
End: 2022-02-11

## 2022-02-11 NOTE — PROGRESS NOTES
[address]  coding opportunities       Chart reviewed, no opportunity found: CHART REVIEWED, NO OPPORTUNITY FOUND                        Patients insurance company: [employer] (Medicare and Commercial for [employer] and [employer])           Please review/recertify the BPA diagnoses for 2022      If this is correct, please assess and document during your next visit, February 18

## 2022-02-18 ENCOUNTER — OFFICE VISIT (OUTPATIENT)
Dept: FAMILY MEDICINE CLINIC | Facility: CLINIC | Age: 52
End: 2022-02-18
Payer: COMMERCIAL

## 2022-02-18 VITALS
SYSTOLIC BLOOD PRESSURE: 90 MMHG | HEIGHT: 70 IN | HEART RATE: 67 BPM | WEIGHT: 152.4 LBS | BODY MASS INDEX: 21.82 KG/M2 | OXYGEN SATURATION: 97 % | DIASTOLIC BLOOD PRESSURE: 78 MMHG | RESPIRATION RATE: 18 BRPM | TEMPERATURE: 98.1 F

## 2022-02-18 DIAGNOSIS — Z13.0 SCREENING, DEFICIENCY ANEMIA, IRON: ICD-10-CM

## 2022-02-18 DIAGNOSIS — E78.5 DYSLIPIDEMIA: ICD-10-CM

## 2022-02-18 DIAGNOSIS — Z12.5 PROSTATE CANCER SCREENING: ICD-10-CM

## 2022-02-18 DIAGNOSIS — F11.20 CONTINUOUS OPIOID DEPENDENCE (HCC): ICD-10-CM

## 2022-02-18 DIAGNOSIS — Z79.899 HIGH RISK MEDICATION USE: ICD-10-CM

## 2022-02-18 DIAGNOSIS — Z13.29 SCREENING FOR THYROID DISORDER: ICD-10-CM

## 2022-02-18 DIAGNOSIS — Z13.1 SCREENING FOR DIABETES MELLITUS: ICD-10-CM

## 2022-02-18 DIAGNOSIS — Z00.00 HEALTH CARE MAINTENANCE: Primary | ICD-10-CM

## 2022-02-18 PROCEDURE — 1036F TOBACCO NON-USER: CPT | Performed by: NURSE PRACTITIONER

## 2022-02-18 PROCEDURE — 36415 COLL VENOUS BLD VENIPUNCTURE: CPT | Performed by: NURSE PRACTITIONER

## 2022-02-18 PROCEDURE — 99396 PREV VISIT EST AGE 40-64: CPT | Performed by: NURSE PRACTITIONER

## 2022-02-18 PROCEDURE — 3008F BODY MASS INDEX DOCD: CPT | Performed by: NURSE PRACTITIONER

## 2022-02-18 PROCEDURE — 3725F SCREEN DEPRESSION PERFORMED: CPT | Performed by: NURSE PRACTITIONER

## 2022-02-18 NOTE — PROGRESS NOTES
ADULT ANNUAL PHYSICAL  [facility] - [facility]    NAME: Lupe Brownlee  AGE: 46 y o  SEX: male  : 1970     DATE: 2022     Assessment and Plan:     Diagnoses and all orders for this visit:    Health care maintenance    Dyslipidemia  -     Lipid panel; Future    Screening, deficiency anemia, iron  -     CBC and Platelet; Future    Screening for thyroid disorder  -     TSH, 3rd generation with Free T4 reflex; Future    Screening for diabetes mellitus  -     Basic metabolic panel; Future    Prostate cancer screening  -     PSA, Total Screen; Future    High risk medication use  -     Lipid panel; Future  -     Basic metabolic panel; Future  -     TSH, 3rd generation with Free T4 reflex; Future  -     CBC and Platelet; Future    Continuous opioid dependence ([facility] [address] )      51 yo male in good health    Health maintenance and preventative care screenings were discussed with patient today  Appropriate education was printed on patient's after visit summary  Counseling:  Dental Health: discussed importance of regular tooth brushing, flossing, and dental visits  Injury prevention: discussed safety/seat belts, safety helmets, smoke detectors, carbon dioxide detectors, and smoking near bedding or upholstery  BMI Counseling: Body mass index is 21 87 kg/m²  Discussed with patient's BMI with him  The BMI is in the acceptable range  · Sexual health: discussed sexually transmitted diseases, partner selection, use of condoms, avoidance of unintended pregnancy, and contraceptive alternatives  Return in about 6 months (around 2022)  Chief Complaint:     Chief Complaint   Patient presents with    Annual Exam     med check       History of Present Illness:     Adult Annual Physical   Patient here for a comprehensive physical exam    The patient reports no problems - Denies chest pain, dizziness, syncope, dyspnea at rest   No significant fam h/o CAD  former smoker   quit 1 5 years ago     Diet and Physical Activity  · Diet/Nutrition: well balanced diet  · Weight concerns: none, patient's BMI is between 18 5-24 9  · Exercise: walking  Depression Screening  PHQ-2/9 Depression Screening    Little interest or pleasure in doing things: 0 - not at all  Feeling down, depressed, or hopeless: 0 - not at all  PHQ-2 Score: 0  PHQ-2 Interpretation: Negative depression screen       General Health  · Sleep: sleeps well  · Hearing: normal - bilateral   · Vision: wears glasses  · Dental: regular dental visits   Health  · Erectile dysfunction: no      Review of Systems:     Review of Systems   Musculoskeletal: Positive for arthralgias and back pain  Ongoing left thumb arthritis  Having injection per Dr Deandra Martinez later today   All other systems reviewed and are negative  Past Medical History:     Past Medical History:   Diagnosis Date    Arthritis     left thumb    Kidney stone     right stone    PONV (postoperative nausea and vomiting) 2019    bad after ESWL      Past Surgical History:     Past Surgical History:   Procedure Laterality Date    EXTRACORPOREAL SHOCK WAVE LITHOTRIPSY      x2    EXTRACORPOREAL SHOCK WAVE LITHOTRIPSY      x3 times total    HERNIA REPAIR      Inguinal hernia repair, left     OTHER SURGICAL HISTORY N/A 01/11/1990    HAD SPLEEN REPAIR SURGERY, TRAUMATIC REPTURE REPAIR    CA FRAGMENT KIDNEY STONE/ ESWL Right 12/11/2019    Procedure: LITHOTRIPSY EXTRACORPORAL SHOCKWAVE (ESWL);   Surgeon: Sarah Koehler MD;  Location: [facility] MAIN OR;  Service: Urology    SPLENECTOMY, PARTIAL  1990    repaired, traumatic      Social History:     Social History     Socioeconomic History    Marital status: /Civil Union     Spouse name: None    Number of children: None    Years of education: None    Highest education level: None   Occupational History    None   Tobacco Use    Smoking status: Former Smoker     Packs/day: 0 50     Years: 20 00     Pack years: 10 00     Quit date: 2017     Years since quittin 2    Smokeless tobacco: Former User    Tobacco comment: chew   Vaping Use    Vaping Use: Never used   Substance and Sexual Activity    Alcohol use: Yes     Comment: rarely    Drug use: No    Sexual activity: None   Other Topics Concern    None   Social History Narrative    History of current every day smoker - as per [employer]    Former smoker - as per [employer]      Social Determinants of Health     Financial Resource Strain: Not on file   Food Insecurity: Not on file   Transportation Needs: Not on file   Physical Activity: Not on file   Stress: Not on file   Social Connections: Not on file   Intimate Partner Violence: Not on file   Housing Stability: Not on file      Family History:     Family History   Problem Relation Age of Onset    Coronary artery disease Mother     Emphysema Mother         Emphysema/COPD    Skin cancer Mother     Emphysema Father         Emphysema/COPD    Heart attack Father         MI involving other coronary artery     Diabetes Paternal Uncle     Emphysema Family     Diabetes Family     Skin cancer Family     Nephrolithiasis Family       Current Medications:     Current Outpatient Medications   Medication Sig Dispense Refill    multivitamin (THERAGRAN) TABS Take 1 tablet by mouth daily      traMADol (ULTRAM) 50 mg tablet Take 1 tablet (50 mg total) by mouth every 12 (twelve) hours as needed for moderate pain 60 tablet 2     No current facility-administered medications for this visit  Allergies:      Allergies   Allergen Reactions    Ezetimibe Rash    Levofloxacin Rash    Penicillins Rash     Unsure of reaction    Statins Myalgia      Objective:     BP 90/78 (BP Location: Left arm, Patient Position: Sitting, Cuff Size: Standard)   Pulse 67   Temp 98 1 °F (36 7 °C)   Resp 18   Ht 5' 10" (1 778 m)   Wt 69 1 kg (152 lb 6 4 oz)   SpO2 97%   BMI 21 87 kg/m²   Physical Exam  Vitals and nursing note reviewed  Constitutional:       General: He is not in acute distress  Appearance: Normal appearance  He is well-developed  HENT:      Head: Normocephalic and atraumatic  Eyes:      General: Lids are normal       Conjunctiva/sclera: Conjunctivae normal       Pupils: Pupils are equal, round, and reactive to light  Neck:      Thyroid: No thyromegaly  Vascular: No carotid bruit  Cardiovascular:      Rate and Rhythm: Normal rate and regular rhythm  Pulses: Normal pulses  Heart sounds: Normal heart sounds  No murmur heard  Pulmonary:      Effort: Pulmonary effort is normal  No respiratory distress  Breath sounds: Normal breath sounds  Abdominal:      General: Bowel sounds are normal  There is no distension  Palpations: Abdomen is soft  Tenderness: There is no abdominal tenderness  Genitourinary:     Comments: Decline exam  Musculoskeletal:         General: No deformity  Back:       Right lower leg: No edema  Left lower leg: No edema  Lymphadenopathy:      Cervical: No cervical adenopathy  Skin:     General: Skin is warm and dry  Coloration: Skin is not pale  Neurological:      General: No focal deficit present  Mental Status: He is alert  Mental status is at baseline  Sensory: No sensory deficit  Motor: No weakness or abnormal muscle tone        Coordination: Coordination normal    Psychiatric:         Mood and Affect: Mood normal          Behavior: Behavior normal        Pulmonary Functions Testing Results:  Normal study     Health Maintenance:     Health Maintenance Topics with due status: Not Due       Topic Last Completion Date    DTaP,Tdap,and Td Vaccines 09/01/2017    Colorectal Cancer Screening 05/03/2021    Depression Screening 02/18/2022    BMI: Adult 02/18/2022    Annual Physical 02/18/2022     The patient has no Health Maintenance topics of status Overdue  Immunization History   Administered Date(s) Administered   [place] Influenza, injectable, quadrivalent, preservative free 0 5 mL 11/04/2019    Tdap 09/01/2017       Marimar Stewart, [address]

## 2022-02-19 LAB
BUN SERPL-MCNC: 14 MG/DL (ref 6–24)
BUN/CREAT SERPL: 17 (ref 9–20)
CALCIUM SERPL-MCNC: 9.8 MG/DL (ref 8.7–10.2)
CHLORIDE SERPL-SCNC: 100 MMOL/L (ref 96–106)
CHOLEST SERPL-MCNC: 244 MG/DL (ref 100–199)
CO2 SERPL-SCNC: 26 MMOL/L (ref 20–29)
CREAT SERPL-MCNC: 0.84 MG/DL (ref 0.76–1.27)
ERYTHROCYTE [DISTWIDTH] IN BLOOD BY AUTOMATED COUNT: 12.3 % (ref 11.6–15.4)
GLUCOSE SERPL-MCNC: 102 MG/DL (ref 65–99)
HCT VFR BLD AUTO: 43.6 % (ref 37.5–51)
HDLC SERPL-MCNC: 68 MG/DL
HGB BLD-MCNC: 14.8 G/DL (ref 13–17.7)
LDLC SERPL CALC-MCNC: 154 MG/DL (ref 0–99)
MCH RBC QN AUTO: 31.6 PG (ref 26.6–33)
MCHC RBC AUTO-ENTMCNC: 33.9 G/DL (ref 31.5–35.7)
MCV RBC AUTO: 93 FL (ref 79–97)
MICRODELETION SYND BLD/T FISH: NORMAL
PLATELET # BLD AUTO: 278 X10E3/UL (ref 150–450)
POTASSIUM SERPL-SCNC: 4.9 MMOL/L (ref 3.5–5.2)
PSA SERPL-MCNC: 0.9 NG/ML (ref 0–4)
RBC # BLD AUTO: 4.68 X10E6/UL (ref 4.14–5.8)
SL AMB EGFR AFRICAN AMERICAN: 117 ML/MIN/1.73
SL AMB EGFR NON AFRICAN AMERICAN: 101 ML/MIN/1.73
SL AMB VLDL CHOLESTEROL CALC: 22 MG/DL (ref 5–40)
SODIUM SERPL-SCNC: 140 MMOL/L (ref 134–144)
TRIGL SERPL-MCNC: 125 MG/DL (ref 0–149)
TSH SERPL DL<=0.005 MIU/L-ACNC: 1.17 UIU/ML (ref 0.45–4.5)
WBC # BLD AUTO: 6.3 X10E3/UL (ref 3.4–10.8)

## 2022-05-15 DIAGNOSIS — M54.50 CHRONIC BILATERAL LOW BACK PAIN WITHOUT SCIATICA: ICD-10-CM

## 2022-05-15 DIAGNOSIS — G89.29 CHRONIC BILATERAL LOW BACK PAIN WITHOUT SCIATICA: ICD-10-CM

## 2022-05-16 RX ORDER — TRAMADOL HYDROCHLORIDE 50 MG/1
50 TABLET ORAL EVERY 12 HOURS PRN
Qty: 60 TABLET | Refills: 0 | Status: SHIPPED | OUTPATIENT
Start: 2022-05-16 | End: 2022-06-16 | Stop reason: SDUPTHER

## 2022-06-13 ENCOUNTER — OFFICE VISIT (OUTPATIENT)
Dept: OBGYN CLINIC | Facility: CLINIC | Age: 52
End: 2022-06-13
Payer: COMMERCIAL

## 2022-06-13 VITALS
DIASTOLIC BLOOD PRESSURE: 71 MMHG | WEIGHT: 149.2 LBS | BODY MASS INDEX: 21.36 KG/M2 | HEIGHT: 70 IN | HEART RATE: 70 BPM | SYSTOLIC BLOOD PRESSURE: 113 MMHG

## 2022-06-13 DIAGNOSIS — M18.12 ARTHRITIS OF CARPOMETACARPAL (CMC) JOINT OF LEFT THUMB: Primary | ICD-10-CM

## 2022-06-13 PROCEDURE — 1036F TOBACCO NON-USER: CPT | Performed by: ORTHOPAEDIC SURGERY

## 2022-06-13 PROCEDURE — 3008F BODY MASS INDEX DOCD: CPT | Performed by: ORTHOPAEDIC SURGERY

## 2022-06-13 PROCEDURE — 20600 DRAIN/INJ JOINT/BURSA W/O US: CPT | Performed by: ORTHOPAEDIC SURGERY

## 2022-06-13 PROCEDURE — 99213 OFFICE O/P EST LOW 20 MIN: CPT | Performed by: ORTHOPAEDIC SURGERY

## 2022-06-13 RX ORDER — LIDOCAINE HYDROCHLORIDE 10 MG/ML
0.5 INJECTION, SOLUTION INFILTRATION; PERINEURAL
Status: COMPLETED | OUTPATIENT
Start: 2022-06-13 | End: 2022-06-13

## 2022-06-13 RX ORDER — TRIAMCINOLONE ACETONIDE 40 MG/ML
40 INJECTION, SUSPENSION INTRA-ARTICULAR; INTRAMUSCULAR
Status: COMPLETED | OUTPATIENT
Start: 2022-06-13 | End: 2022-06-13

## 2022-06-13 RX ADMIN — TRIAMCINOLONE ACETONIDE 40 MG: 40 INJECTION, SUSPENSION INTRA-ARTICULAR; INTRAMUSCULAR at 10:29

## 2022-06-13 RX ADMIN — LIDOCAINE HYDROCHLORIDE 0.5 ML: 10 INJECTION, SOLUTION INFILTRATION; PERINEURAL at 10:29

## 2022-06-13 NOTE — PROGRESS NOTES
Assessment/Plan:  1  Arthritis of carpometacarpal (CMC) joint of left thumb  Small joint arthrocentesis: L thumb CMC       Scribe Attestation    I,:  Fanta Macias MA am acting as a scribe while in the presence of the attending physician :       I,:  Nathalia Dean DO personally performed the services described in this documentation    as scribed in my presence  :             49-year-old male with left thumb CMC arthritis  Patient did see good relief from previous left thumb CMC injection  He is tender to palpation over the thumb CMC in exam  He does have a positive grind  We did discuss a repeat left thumb CMC injection  Patient was agreeable to this  He consented and underwent a left thumb CMC injection in the office today without any complications  Patient can continue with the comfort cool brace as needed  He may follow up with me as needed  I did provide him with follow-up with Dr Beau Deleon in the future  Subjective:   Jemima Bustos is a 46 y o  male who presents to the office today for follow up evaluation left thumb CMC arthritis  Patient underwent a steroid injection at his last visit on 2/10/22 which he states provided him with good relief  Patient states the pain started to return over the past 1-2 weeks  He notes pain to the base of his left thumb that is increased with driving  He has been using the comfort cool brace at night and as needed throughout the day  he does take Tramadol for pain  Review of Systems   Constitutional: Negative for chills and fever  HENT: Negative for drooling and sneezing  Eyes: Negative for redness  Respiratory: Negative for cough and wheezing  Gastrointestinal: Negative for nausea and vomiting  Musculoskeletal: Negative for arthralgias, joint swelling and myalgias  Neurological: Negative for weakness and numbness  Psychiatric/Behavioral: Negative for behavioral problems  The patient is not nervous/anxious            Past Medical History:   Diagnosis Date    Arthritis     left thumb    Kidney stone     right stone    PONV (postoperative nausea and vomiting) 2019    bad after ESWL       Past Surgical History:   Procedure Laterality Date    EXTRACORPOREAL SHOCK WAVE LITHOTRIPSY      x2    EXTRACORPOREAL SHOCK WAVE LITHOTRIPSY      x3 times total    HERNIA REPAIR      Inguinal hernia repair, left     OTHER SURGICAL HISTORY N/A 1990    HAD SPLEEN REPAIR SURGERY, TRAUMATIC REPTURE REPAIR    AR FRAGMENT KIDNEY STONE/ ESWL Right 2019    Procedure: LITHOTRIPSY EXTRACORPORAL SHOCKWAVE (ESWL);   Surgeon: Sid Reyes MD;  Location: [facility] MAIN OR;  Service: Urology    SPLENECTOMY, PARTIAL  1990    repaired, traumatic       Family History   Problem Relation Age of Onset    Coronary artery disease Mother     Emphysema Mother         Emphysema/COPD    Skin cancer Mother     Emphysema Father         Emphysema/COPD    Heart attack Father         MI involving other coronary artery     Diabetes Paternal Uncle     Emphysema Family     Diabetes Family     Skin cancer Family     Nephrolithiasis Family        Social History     Occupational History    Not on file   Tobacco Use    Smoking status: Former Smoker     Packs/day: 0 50     Years: 20 00     Pack years: 10 00     Quit date: 2017     Years since quittin 5    Smokeless tobacco: Former User    Tobacco comment: chew   Vaping Use    Vaping Use: Never used   Substance and Sexual Activity    Alcohol use: Yes     Comment: rarely    Drug use: No    Sexual activity: Not on file         Current Outpatient Medications:     multivitamin (THERAGRAN) TABS, Take 1 tablet by mouth daily, Disp: , Rfl:     traMADol (ULTRAM) 50 mg tablet, Take 1 tablet (50 mg total) by mouth every 12 (twelve) hours as needed for moderate pain, Disp: 60 tablet, Rfl: 0    Allergies   Allergen Reactions    Ezetimibe Rash    Levofloxacin Rash    Penicillins Rash     Unsure of reaction    Statins Myalgia Objective:  Vitals:    06/13/22 1006   BP: 113/71   Pulse: 70       Ortho Exam     Left thumb    TTP thumb CMC  Swelling CMC  + grind  Compartments soft  Brisk capillary refill  S/m intact median, radial, and ulnar nerve     Physical Exam  Constitutional:       Appearance: He is well-developed  HENT:      Head: Normocephalic and atraumatic  Eyes:      General:         Right eye: No discharge  Left eye: No discharge  Conjunctiva/sclera: Conjunctivae normal    Cardiovascular:      Rate and Rhythm: Normal rate  Pulmonary:      Effort: Pulmonary effort is normal  No respiratory distress  Musculoskeletal:      Cervical back: Normal range of motion and neck supple  Comments: As noted in HPI   Skin:     General: Skin is warm and dry  Neurological:      Mental Status: He is alert and oriented to person, place, and time  Psychiatric:         Behavior: Behavior normal          Thought Content: Thought content normal          Judgment: Judgment normal      Small joint arthrocentesis: L thumb CMC  [place] Protocol:  Consent: Verbal consent obtained    Consent given by: patient  Patient identity confirmed: verbally with patient    Supporting Documentation  Indications: pain   Procedure Details  Location: thumb - L thumb CMC  Preparation: Patient was prepped and draped in the usual sterile fashion  Needle size: 27 G  Ultrasound guidance: no  Medications administered: 0 5 mL lidocaine 1 %; 40 mg triamcinolone acetonide 40 mg/mL    Patient tolerance: patient tolerated the procedure well with no immediate complications  Dressing:  Sterile dressing applied

## 2022-06-14 ENCOUNTER — TELEPHONE (OUTPATIENT)
Dept: DERMATOLOGY | Facility: CLINIC | Age: 52
End: 2022-06-14

## 2022-06-16 DIAGNOSIS — G89.29 CHRONIC BILATERAL LOW BACK PAIN WITHOUT SCIATICA: ICD-10-CM

## 2022-06-16 DIAGNOSIS — M54.50 CHRONIC BILATERAL LOW BACK PAIN WITHOUT SCIATICA: ICD-10-CM

## 2022-06-17 RX ORDER — TRAMADOL HYDROCHLORIDE 50 MG/1
50 TABLET ORAL EVERY 12 HOURS PRN
Qty: 60 TABLET | Refills: 0 | Status: SHIPPED | OUTPATIENT
Start: 2022-06-17 | End: 2022-07-18 | Stop reason: SDUPTHER

## 2022-07-18 DIAGNOSIS — G89.29 CHRONIC BILATERAL LOW BACK PAIN WITHOUT SCIATICA: ICD-10-CM

## 2022-07-18 DIAGNOSIS — M54.50 CHRONIC BILATERAL LOW BACK PAIN WITHOUT SCIATICA: ICD-10-CM

## 2022-07-19 RX ORDER — TRAMADOL HYDROCHLORIDE 50 MG/1
50 TABLET ORAL EVERY 12 HOURS PRN
Qty: 60 TABLET | Refills: 0 | Status: SHIPPED | OUTPATIENT
Start: 2022-07-19 | End: 2022-08-19 | Stop reason: SDUPTHER

## 2022-09-19 DIAGNOSIS — G89.29 CHRONIC BILATERAL LOW BACK PAIN WITHOUT SCIATICA: ICD-10-CM

## 2022-09-19 DIAGNOSIS — M54.50 CHRONIC BILATERAL LOW BACK PAIN WITHOUT SCIATICA: ICD-10-CM

## 2022-09-19 RX ORDER — TRAMADOL HYDROCHLORIDE 50 MG/1
50 TABLET ORAL EVERY 12 HOURS PRN
Qty: 60 TABLET | Refills: 1 | OUTPATIENT
Start: 2022-09-19

## 2022-09-19 RX ORDER — TRAMADOL HYDROCHLORIDE 50 MG/1
50 TABLET ORAL EVERY 12 HOURS PRN
Qty: 60 TABLET | Refills: 1 | Status: SHIPPED | OUTPATIENT
Start: 2022-09-19

## 2022-09-19 NOTE — TELEPHONE ENCOUNTER
Patient called back and advised he is following León Garcia to [employer], which is office he call for this refill

## 2022-10-04 ENCOUNTER — OFFICE VISIT (OUTPATIENT)
Dept: OBGYN CLINIC | Facility: CLINIC | Age: 52
End: 2022-10-04
Payer: COMMERCIAL

## 2022-10-04 ENCOUNTER — APPOINTMENT (OUTPATIENT)
Dept: RADIOLOGY | Facility: CLINIC | Age: 52
End: 2022-10-04
Payer: COMMERCIAL

## 2022-10-04 VITALS
DIASTOLIC BLOOD PRESSURE: 70 MMHG | HEIGHT: 70 IN | HEART RATE: 60 BPM | BODY MASS INDEX: 21.22 KG/M2 | WEIGHT: 148.2 LBS | SYSTOLIC BLOOD PRESSURE: 121 MMHG

## 2022-10-04 DIAGNOSIS — M18.12 ARTHRITIS OF CARPOMETACARPAL (CMC) JOINT OF LEFT THUMB: Primary | ICD-10-CM

## 2022-10-04 DIAGNOSIS — Z01.812 ENCOUNTER FOR PRE-OPERATIVE LABORATORY TESTING: ICD-10-CM

## 2022-10-04 DIAGNOSIS — M18.12 ARTHRITIS OF CARPOMETACARPAL (CMC) JOINT OF LEFT THUMB: ICD-10-CM

## 2022-10-04 DIAGNOSIS — Z11.59 SPECIAL SCREENING EXAMINATION FOR VIRAL DISEASE: ICD-10-CM

## 2022-10-04 PROCEDURE — 73140 X-RAY EXAM OF FINGER(S): CPT

## 2022-10-04 PROCEDURE — 99214 OFFICE O/P EST MOD 30 MIN: CPT | Performed by: ORTHOPAEDIC SURGERY

## 2022-10-04 NOTE — PROGRESS NOTES
Assessment/Plan:  1  Arthritis of carpometacarpal (CMC) joint of left thumb  XR thumb left first digit-min 2v    Ambulatory Referral to PT/OT Hand Therapy    Case request operating room: Carpometacarpal arthroplasty of left thumb    Ambulatory referral to [facility]    CBC and differential    Basic metabolic panel    APTT    Protime-INR    EKG 12 lead    PAT Covid Screening    Case request operating room: Carpometacarpal arthroplasty of left thumb   2  Encounter for pre-operative laboratory testing  Ambulatory referral to [facility]    CBC and differential    Basic metabolic panel    APTT    Protime-INR    EKG 12 lead    PAT Covid Screening   3  Special screening examination for viral disease  Ambulatory referral to [facility]    CBC and differential    Basic metabolic panel    APTT    Protime-INR    EKG 12 lead    PAT Covid Screening       Scribe Attestation    I,:  Yobani Martinez am acting as a scribe while in the presence of the attending physician :       I,:  Ly Gallegos MD personally performed the services described in this documentation    as scribed in my presence :           Glynn's medical history was reviewed  Jodi Álvarez has severe CMC arthritis of the left thumb  He has received 4 injections previously  In my opinion additional injections would cause further destruction of the joint  I do feel he is a candidate for a carpometacarpal arthroplasty of the left thumb  We discussed the procedure in great detail  He will be in a splint following his surgery and will require physical therapy  Full recovery typically takes up to 4 months  My goal is that he will find a decrease in pain and greater strength in the thumb following his surgery  In addition we spoke of the risks   Risk of the surgery are inclusive of but not limited to bleeding, infection, nerve injury, blood clot, worsening of symptoms, not achieving the anticipated results, persistent stiffness, weakness and the need for additional surgery  The patient verbally stated he understood those risks and would like to proceed with the surgery  Prior to surgery I am requesting CBC, BMP, PT and PTT testing  He will also require a COVID 19 screening  I would like primary care physician clearance as well  Charlotte Alvarez would like to have his surgery sometime after the holidays  Written consent was obtained today  The next time I see him will be in surgery  Subjective:   Napoleon Breen is a 46 y o  male who presents to the office today for evaluation of his left thumb  Charlotte Alvarez has a known history of severe CMC osteoarthritis of his left thumb  He is a former patient of Dr Pramod Crenshaw  He has received 4 injections previously that are typically successful in relieving pain for approximately 4 months until it returns  Glynn's chief complaint is of an intermittent pain located at the base of the left thumb  The pain can be described as sharp and moderate to severe  At its worst he has a persistent ache the find some relief with tramadol  He speaks of painful crepitus throughout his day  He will wear a brace at times which helps somewhat  Charlotte Alvarez is aware that additional injections can be detrimental and would like to discuss additional treatment options  Review of Systems   Constitutional: Negative for chills, fever and unexpected weight change  HENT: Negative for hearing loss, nosebleeds and sore throat  Eyes: Negative for pain, redness and visual disturbance  Respiratory: Negative for cough, shortness of breath and wheezing  Cardiovascular: Negative for chest pain, palpitations and leg swelling  Gastrointestinal: Negative for abdominal pain, nausea and vomiting  Endocrine: Negative for polyphagia and polyuria  Genitourinary: Negative for dysuria and hematuria  Musculoskeletal:        See HPI   Skin: Negative for rash and wound  Neurological: Negative for dizziness, numbness and headaches     Psychiatric/Behavioral: Negative for decreased concentration and suicidal ideas  The patient is not nervous/anxious  Past Medical History:   Diagnosis Date    Arthritis     left thumb    Kidney stone     right stone    PONV (postoperative nausea and vomiting) 2019    bad after ESWL       Past Surgical History:   Procedure Laterality Date    EXTRACORPOREAL SHOCK WAVE LITHOTRIPSY      x2    EXTRACORPOREAL SHOCK WAVE LITHOTRIPSY      x3 times total    HERNIA REPAIR      Inguinal hernia repair, left     OTHER SURGICAL HISTORY N/A 1990    HAD SPLEEN REPAIR SURGERY, TRAUMATIC REPTURE REPAIR    [place] FRAGMENT KIDNEY STONE/ ESWL Right 2019    Procedure: LITHOTRIPSY EXTRACORPORAL SHOCKWAVE (ESWL);   Surgeon: Maria Teresa Beach MD;  [facility] MA[facility];  Service: Urology    SPLENECTOMY, PARTIAL  1990    repaired, traumatic       Family History   Problem Relation Age of Onset    Coronary artery disease Mother     Emphysema Mother         Emphysema/COPD    Skin cancer Mother     Emphysema Father         Emphysema/COPD    Heart attack Father         MI involving other coronary artery     Diabetes Paternal Uncle     Emphysema Family     Diabetes Family     Skin cancer Family     Nephrolithiasis Family        Social History     Occupational History    Not on file   Tobacco Use    Smoking status: Former Smoker     Packs/day: 0 50     Years: 20 00     Pack years: 10 00     Quit date: 2017     Years since quittin 8    Smokeless tobacco: Former User    Tobacco comment: chew   Vaping Use    Vaping Use: Never used   Substance and Sexual Activity    Alcohol use: Yes     Comment: rarely    Drug use: No    Sexual activity: Not on file         Current Outpatient Medications:     multivitamin (THERAGRAN) TABS, Take 1 tablet by mouth daily, Disp: , Rfl:     traMADol (ULTRAM) 50 mg tablet, Take 1 tablet (50 mg total) by mouth every 12 (twelve) hours as needed for moderate pain, Disp: 60 tablet, Rfl: 1    Allergies   Allergen Reactions    Ezetimibe Rash    Levofloxacin Rash    Penicillins Rash     Unsure of reaction    Statins Myalgia       Objective:  Vitals:    10/04/22 0753   BP: 121/70   Pulse: 60       Left Hand Exam     Tenderness   The patient is experiencing tenderness in the radial area [place] joint of left thumb)  Range of Motion   Hand   MP Thumb: normal   DIP Thumb: normal     Muscle Strength   :  4/5     Other   Sensation: normal  Pulse: present    Comments:  (+) grind cmc of left thumb  Normal pinch strength    Crepitus observed with palpation and ROM of thumb            Physical Exam  Vitals reviewed  Constitutional:       Appearance: He is well-developed  HENT:      Head: Normocephalic and atraumatic  Eyes:      General:         Right eye: No discharge  Left eye: No discharge  Conjunctiva/sclera: Conjunctivae normal    Cardiovascular:      Rate and Rhythm: Regular rhythm  Heart sounds: Normal heart sounds  Pulmonary:      Effort: Pulmonary effort is normal  No respiratory distress  Breath sounds: Normal breath sounds  Musculoskeletal:      Cervical back: Normal range of motion and neck supple  Skin:     General: Skin is warm and dry  Neurological:      Mental Status: He is alert and oriented to person, place, and time  Psychiatric:         Behavior: Behavior normal          I have personally reviewed pertinent films in PACS and my interpretation is as follows:  Xrays of the left thumb performed today demonstrate severe CMC osteoarthritis of the left thumb  There is no evidence of acute fracture or other osseous abnormality

## 2022-11-29 DIAGNOSIS — M54.50 CHRONIC BILATERAL LOW BACK PAIN WITHOUT SCIATICA: ICD-10-CM

## 2022-11-29 DIAGNOSIS — G89.29 CHRONIC BILATERAL LOW BACK PAIN WITHOUT SCIATICA: ICD-10-CM

## 2022-11-29 RX ORDER — TRAMADOL HYDROCHLORIDE 50 MG/1
50 TABLET ORAL EVERY 12 HOURS PRN
Qty: 60 TABLET | Refills: 0 | Status: SHIPPED | OUTPATIENT
Start: 2022-11-29

## 2022-12-07 ENCOUNTER — TELEPHONE (OUTPATIENT)
Dept: OBGYN CLINIC | Facility: CLINIC | Age: 52
End: 2022-12-07

## 2022-12-07 NOTE — TELEPHONE ENCOUNTER
Patient called, stated he would like to cancel surgery at this time  He stated he feels like he still has some strength and has not been experiencing any pain in his hand, also some personal things have changed and he needs to be able to use his hand over the winter  He will call back on an as needed basis  Surgery cancelled and all clearance appts

## 2023-02-10 ENCOUNTER — APPOINTMENT (OUTPATIENT)
Dept: LAB | Facility: CLINIC | Age: 53
End: 2023-02-10

## 2023-02-10 DIAGNOSIS — Z12.5 PROSTATE CANCER SCREENING: ICD-10-CM

## 2023-02-10 DIAGNOSIS — Z79.899 HIGH RISK MEDICATION USE: ICD-10-CM

## 2023-02-10 DIAGNOSIS — E78.5 DYSLIPIDEMIA: ICD-10-CM

## 2023-02-10 DIAGNOSIS — Z13.0 SCREENING, DEFICIENCY ANEMIA, IRON: ICD-10-CM

## 2023-02-10 DIAGNOSIS — Z13.1 SCREENING FOR DIABETES MELLITUS: ICD-10-CM

## 2023-02-10 DIAGNOSIS — Z13.29 SCREENING FOR THYROID DISORDER: ICD-10-CM

## 2023-02-10 LAB
ANION GAP SERPL CALCULATED.3IONS-SCNC: 1 MMOL/L (ref 4–13)
BUN SERPL-MCNC: 16 MG/DL (ref 5–25)
CALCIUM SERPL-MCNC: 8.9 MG/DL (ref 8.3–10.1)
CHLORIDE SERPL-SCNC: 101 MMOL/L (ref 96–108)
CHOLEST SERPL-MCNC: 239 MG/DL
CO2 SERPL-SCNC: 31 MMOL/L (ref 21–32)
CREAT SERPL-MCNC: 0.77 MG/DL (ref 0.6–1.3)
ERYTHROCYTE [DISTWIDTH] IN BLOOD BY AUTOMATED COUNT: 12.7 % (ref 11.6–15.1)
GFR SERPL CREATININE-BSD FRML MDRD: 104 ML/MIN/1.73SQ M
GLUCOSE P FAST SERPL-MCNC: 104 MG/DL (ref 65–99)
HCT VFR BLD AUTO: 43.4 % (ref 36.5–49.3)
HDLC SERPL-MCNC: 70 MG/DL
HGB BLD-MCNC: 14.5 G/DL (ref 12–17)
LDLC SERPL CALC-MCNC: 145 MG/DL (ref 0–100)
MCH RBC QN AUTO: 31 PG (ref 26.8–34.3)
MCHC RBC AUTO-ENTMCNC: 33.4 G/DL (ref 31.4–37.4)
MCV RBC AUTO: 93 FL (ref 82–98)
NONHDLC SERPL-MCNC: 169 MG/DL
PLATELET # BLD AUTO: 274 THOUSANDS/UL (ref 149–390)
PMV BLD AUTO: 11 FL (ref 8.9–12.7)
POTASSIUM SERPL-SCNC: 4 MMOL/L (ref 3.5–5.3)
PSA SERPL-MCNC: 1 NG/ML (ref 0–4)
RBC # BLD AUTO: 4.67 MILLION/UL (ref 3.88–5.62)
SODIUM SERPL-SCNC: 133 MMOL/L (ref 135–147)
TRIGL SERPL-MCNC: 121 MG/DL
TSH SERPL DL<=0.05 MIU/L-ACNC: 1.3 UIU/ML (ref 0.45–4.5)
WBC # BLD AUTO: 6.07 THOUSAND/UL (ref 4.31–10.16)

## 2023-02-24 ENCOUNTER — OFFICE VISIT (OUTPATIENT)
Dept: INTERNAL MEDICINE CLINIC | Facility: CLINIC | Age: 53
End: 2023-02-24

## 2023-02-24 VITALS
HEIGHT: 70 IN | SYSTOLIC BLOOD PRESSURE: 112 MMHG | BODY MASS INDEX: 20.1 KG/M2 | OXYGEN SATURATION: 95 % | DIASTOLIC BLOOD PRESSURE: 74 MMHG | WEIGHT: 140.4 LBS | HEART RATE: 70 BPM

## 2023-02-24 DIAGNOSIS — E78.5 DYSLIPIDEMIA: ICD-10-CM

## 2023-02-24 DIAGNOSIS — Z79.899 DRUG THERAPY: ICD-10-CM

## 2023-02-24 DIAGNOSIS — M19.049 HAND ARTHROPATHY: ICD-10-CM

## 2023-02-24 DIAGNOSIS — Z13.0 SCREENING, DEFICIENCY ANEMIA, IRON: ICD-10-CM

## 2023-02-24 DIAGNOSIS — F11.20 CONTINUOUS OPIOID DEPENDENCE (HCC): ICD-10-CM

## 2023-02-24 DIAGNOSIS — Z00.00 HEALTH CARE MAINTENANCE: Primary | ICD-10-CM

## 2023-02-24 DIAGNOSIS — Z13.1 SCREENING FOR DIABETES MELLITUS: ICD-10-CM

## 2023-02-24 DIAGNOSIS — Z13.29 SCREENING FOR THYROID DISORDER: ICD-10-CM

## 2023-02-24 DIAGNOSIS — Z12.5 PROSTATE CANCER SCREENING: ICD-10-CM

## 2023-02-24 DIAGNOSIS — S39.012A BACK STRAIN, INITIAL ENCOUNTER: ICD-10-CM

## 2023-02-24 NOTE — PROGRESS NOTES
ADULT ANNUAL PHYSICAL  [facility] [place]    NAME: Rocio Santiago  AGE: 46 y o  SEX: male  : 1970     DATE: 2023     Assessment and Plan:     Diagnoses and all orders for this visit:    Health care maintenance    Hand arthropathy    Dyslipidemia  -     Lipid panel; Future    Continuous opioid dependence ([address] )    Back strain, initial encounter    Prostate cancer screening  -     PSA, Total Screen; Future    Drug therapy  -     Lipid panel; Future  -     CBC and differential; Future  -     TSH, 3rd generation with Free T4 reflex; Future  -     Comprehensive metabolic panel; Future    Screening for thyroid disorder  -     TSH, 3rd generation with Free T4 reflex; Future    Screening for diabetes mellitus  -     Comprehensive metabolic panel; Future    Screening, deficiency anemia, iron  -     CBC and differential; Future      45 yo male in good health    Health maintenance and preventative care screenings were discussed with patient today  Appropriate education was printed on patient's after visit summary  Counseling:  Dental Health: discussed importance of regular tooth brushing, flossing, and dental visits  Injury prevention: discussed safety/seat belts, safety helmets, smoke detectors, carbon dioxide detectors, and smoking near bedding or upholstery  BMI Counseling: Body mass index is 20 15 kg/m²  Discussed with patient's BMI with him  The BMI is in the acceptable range  · Sexual health: discussed sexually transmitted diseases, partner selection, use of condoms, avoidance of unintended pregnancy, and contraceptive alternatives  Return in about 1 year (around 2024)       Chief Complaint:     Chief Complaint   Patient presents with   • Physical Exam      History of Present Illness:     Adult Annual Physical   Patient here for a comprehensive physical exam    The patient reports no problems - Denies chest pain, dizziness, syncope, dyspnea at rest   No significant fam h/o CAD  former smoker  quit 1 5 years ago  Diet and Physical Activity  · Diet/Nutrition: well balanced diet  · Weight concerns: none, patient's BMI is between 18 5-24 9  · Exercise: walking  Depression Screening  PHQ-2/9 Depression Screening    Little interest or pleasure in doing things: 0 - not at all  Feeling down, depressed, or hopeless: 0 - not at all  PHQ-2 Score: 0  PHQ-2 Interpretation: Negative depression screen       General Health  · Sleep: sleeps well  · Hearing: normal - bilateral   · Vision: wears glasses  · Dental: regular dental visits   Health  · Erectile dysfunction: no      Review of Systems:     Review of Systems   Musculoskeletal: Positive for arthralgias and back pain  Ongoing left thumb arthritis  All other systems reviewed and are negative  Past Medical History:     Past Medical History:   Diagnosis Date   • Arthritis     left thumb   • Kidney stone     right stone   • PONV (postoperative nausea and vomiting) 2019    bad after ESWL      Past Surgical History:     Past Surgical History:   Procedure Laterality Date   • EXTRACORPOREAL SHOCK WAVE LITHOTRIPSY      x2   • EXTRACORPOREAL SHOCK WAVE LITHOTRIPSY      x3 times total   • HERNIA REPAIR      Inguinal hernia repair, left    • OTHER SURGICAL HISTORY N/A 01/11/1990    HAD SPLEEN REPAIR SURGERY, TRAUMATIC REPTURE REPAIR   • [place] LITHOTRIPSY XTRCORP SHOCK WAVE Right 12/11/2019    Procedure: LITHOTRIPSY EXTRACORPORAL SHOCKWAVE (ESWL);   Surgeon: Isaac Mckeon MD;  Location: [facility] MAIN OR;  Service: Urology   • SPLENECTOMY, PARTIAL  1990    repaired, traumatic      Social History:     Social History     Socioeconomic History   • Marital status: /Civil Union     Spouse name: None   • Number of children: None   • Years of education: None   • Highest education level: None   Occupational History   • None   Tobacco Use   • Smoking status: Former     Packs/day: 0 50     Years: 20 00 Pack years: 10 00     Types: Cigarettes     Quit date: 2017     Years since quittin 7   • Smokeless tobacco: Former   • Tobacco comments:     chew   Vaping Use   • Vaping Use: Never used   Substance and Sexual Activity   • Alcohol use: Yes     Comment: rarely   • Drug use: No   • Sexual activity: Yes     Partners: Female     Birth control/protection: None   Other Topics Concern   • None   Social History Narrative    History of current every day smoker - as per [employer]    Former smoker - as per [employer]      Social Determinants of Health     Financial Resource Strain: Not on file   Food Insecurity: Not on file   Transportation Needs: Not on file   Physical Activity: Not on file   Stress: Not on file   Social Connections: Not on file   Intimate Partner Violence: Not on file   Housing Stability: Not on file      Family History:     Family History   Problem Relation Age of Onset   • Coronary artery disease Mother    • Emphysema Mother         Emphysema/COPD   • Skin cancer Mother    • Emphysema Father         Emphysema/COPD   • Heart attack Father         MI involving other coronary artery    • Diabetes Paternal Uncle    • Emphysema Family    • Diabetes Family    • Skin cancer Family    • Nephrolithiasis Family    • Breast cancer Sister       Current Medications:     Current Outpatient Medications   Medication Sig Dispense Refill   • multivitamin (THERAGRAN) TABS Take 1 tablet by mouth daily     • traMADol (ULTRAM) 50 mg tablet Take 1 tablet (50 mg total) by mouth every 12 (twelve) hours as needed for moderate pain 60 tablet 1     No current facility-administered medications for this visit  Allergies:      Allergies   Allergen Reactions   • Ezetimibe Rash   • Levofloxacin Rash   • Penicillins Rash     Unsure of reaction   • Statins Myalgia      Objective:     /74   Pulse 70   Ht 5' 10" (1 778 m)   Wt 63 7 kg (140 lb 6 4 oz)   SpO2 95%   BMI 20 15 kg/m²   Physical Exam  Vitals and nursing note reviewed  Constitutional:       General: He is not in acute distress  Appearance: Normal appearance  He is well-developed  HENT:      Head: Normocephalic and atraumatic  Eyes:      General: Lids are normal       Conjunctiva/sclera: Conjunctivae normal       Pupils: Pupils are equal, round, and reactive to light  Neck:      Thyroid: No thyromegaly  Vascular: No carotid bruit  Cardiovascular:      Rate and Rhythm: Normal rate and regular rhythm  Pulses: Normal pulses  Heart sounds: Normal heart sounds  No murmur heard  Pulmonary:      Effort: Pulmonary effort is normal  No respiratory distress  Breath sounds: Normal breath sounds  Abdominal:      General: Bowel sounds are normal  There is no distension  Palpations: Abdomen is soft  Tenderness: There is no abdominal tenderness  Genitourinary:     Comments: Decline exam  Musculoskeletal:         General: No deformity  Back:       Right lower leg: No edema  Left lower leg: No edema  Lymphadenopathy:      Cervical: No cervical adenopathy  Skin:     General: Skin is warm and dry  Coloration: Skin is not pale  Neurological:      General: No focal deficit present  Mental Status: He is alert  Mental status is at baseline  Sensory: No sensory deficit  Motor: No weakness or abnormal muscle tone        Coordination: Coordination normal    Psychiatric:         Mood and Affect: Mood normal          Behavior: Behavior normal        Pulmonary Functions Testing Results:  Normal study     Health Maintenance:     Health Maintenance Topics with due status: Not Due       Topic Last Completion Date    DTaP,Tdap,and Td Vaccines 09/01/2017    Colorectal Cancer Screening 05/03/2021    Depression Screening 02/24/2023    BMI: Adult 02/24/2023    Annual Physical 02/24/2023     The patient has no Health Maintenance topics of status Overdue  Immunization History   Administered Date(s) Administered   • Influenza, injectable, quadrivalent, preservative free 0 5 mL 11/04/2019   • Tdap 09/01/2017       LAVERNE GARBER  [facility] [place]

## 2023-03-07 DIAGNOSIS — M54.50 CHRONIC BILATERAL LOW BACK PAIN WITHOUT SCIATICA: ICD-10-CM

## 2023-03-07 DIAGNOSIS — G89.29 CHRONIC BILATERAL LOW BACK PAIN WITHOUT SCIATICA: ICD-10-CM

## 2023-03-07 RX ORDER — TRAMADOL HYDROCHLORIDE 50 MG/1
50 TABLET ORAL EVERY 12 HOURS PRN
Qty: 60 TABLET | Refills: 0 | Status: SHIPPED | OUTPATIENT
Start: 2023-03-07

## 2023-07-20 DIAGNOSIS — G89.29 CHRONIC BILATERAL LOW BACK PAIN WITHOUT SCIATICA: ICD-10-CM

## 2023-07-20 DIAGNOSIS — M54.50 CHRONIC BILATERAL LOW BACK PAIN WITHOUT SCIATICA: ICD-10-CM

## 2023-07-20 RX ORDER — TRAMADOL HYDROCHLORIDE 50 MG/1
50 TABLET ORAL EVERY 12 HOURS PRN
Qty: 60 TABLET | Refills: 3 | Status: SHIPPED | OUTPATIENT
Start: 2023-07-20

## 2023-11-27 DIAGNOSIS — G89.29 CHRONIC BILATERAL LOW BACK PAIN WITHOUT SCIATICA: ICD-10-CM

## 2023-11-27 DIAGNOSIS — M54.50 CHRONIC BILATERAL LOW BACK PAIN WITHOUT SCIATICA: ICD-10-CM

## 2023-11-27 RX ORDER — TRAMADOL HYDROCHLORIDE 50 MG/1
TABLET ORAL
Qty: 60 TABLET | Refills: 0 | Status: SHIPPED | OUTPATIENT
Start: 2023-11-27

## 2023-12-29 DIAGNOSIS — G89.29 CHRONIC BILATERAL LOW BACK PAIN WITHOUT SCIATICA: ICD-10-CM

## 2023-12-29 DIAGNOSIS — M54.50 CHRONIC BILATERAL LOW BACK PAIN WITHOUT SCIATICA: ICD-10-CM

## 2024-01-02 RX ORDER — TRAMADOL HYDROCHLORIDE 50 MG/1
TABLET ORAL
Qty: 60 TABLET | Refills: 0 | Status: SHIPPED | OUTPATIENT
Start: 2024-01-02

## 2024-02-02 DIAGNOSIS — G89.29 CHRONIC BILATERAL LOW BACK PAIN WITHOUT SCIATICA: ICD-10-CM

## 2024-02-02 DIAGNOSIS — M54.50 CHRONIC BILATERAL LOW BACK PAIN WITHOUT SCIATICA: ICD-10-CM

## 2024-02-02 RX ORDER — TRAMADOL HYDROCHLORIDE 50 MG/1
TABLET ORAL
Qty: 60 TABLET | Refills: 0 | Status: SHIPPED | OUTPATIENT
Start: 2024-02-02

## 2024-02-09 ENCOUNTER — RA CDI HCC (OUTPATIENT)
Dept: OTHER | Facility: HOSPITAL | Age: 54
End: 2024-02-09

## 2024-02-09 NOTE — PROGRESS NOTES
HCC coding opportunities       Chart reviewed, no opportunity found: CHART REVIEWED, NO OPPORTUNITY FOUND        Patients Insurance        Commercial Insurance: [employer]

## 2024-02-14 ENCOUNTER — APPOINTMENT (OUTPATIENT)
Dept: LAB | Facility: CLINIC | Age: 54
End: 2024-02-14
Payer: COMMERCIAL

## 2024-02-14 DIAGNOSIS — E78.5 DYSLIPIDEMIA: ICD-10-CM

## 2024-02-14 DIAGNOSIS — Z79.899 DRUG THERAPY: ICD-10-CM

## 2024-02-14 DIAGNOSIS — Z12.5 PROSTATE CANCER SCREENING: ICD-10-CM

## 2024-02-14 DIAGNOSIS — Z13.1 SCREENING FOR DIABETES MELLITUS: ICD-10-CM

## 2024-02-14 DIAGNOSIS — Z13.29 SCREENING FOR THYROID DISORDER: ICD-10-CM

## 2024-02-14 DIAGNOSIS — Z13.0 SCREENING, DEFICIENCY ANEMIA, IRON: ICD-10-CM

## 2024-02-14 LAB
ALBUMIN SERPL BCP-MCNC: 4.9 G/DL (ref 3.5–5)
ALP SERPL-CCNC: 35 U/L (ref 34–104)
ALT SERPL W P-5'-P-CCNC: 16 U/L (ref 7–52)
ANION GAP SERPL CALCULATED.3IONS-SCNC: 9 MMOL/L
AST SERPL W P-5'-P-CCNC: 21 U/L (ref 13–39)
BASOPHILS # BLD AUTO: 0.05 THOUSANDS/ÂΜL (ref 0–0.1)
BASOPHILS NFR BLD AUTO: 1 % (ref 0–1)
BILIRUB SERPL-MCNC: 0.81 MG/DL (ref 0.2–1)
BUN SERPL-MCNC: 18 MG/DL (ref 5–25)
CALCIUM SERPL-MCNC: 9.4 MG/DL (ref 8.4–10.2)
CHLORIDE SERPL-SCNC: 103 MMOL/L (ref 96–108)
CHOLEST SERPL-MCNC: 248 MG/DL
CO2 SERPL-SCNC: 27 MMOL/L (ref 21–32)
CREAT SERPL-MCNC: 0.9 MG/DL (ref 0.6–1.3)
EOSINOPHIL # BLD AUTO: 0.15 THOUSAND/ÂΜL (ref 0–0.61)
EOSINOPHIL NFR BLD AUTO: 2 % (ref 0–6)
ERYTHROCYTE [DISTWIDTH] IN BLOOD BY AUTOMATED COUNT: 12.8 % (ref 11.6–15.1)
GFR SERPL CREATININE-BSD FRML MDRD: 97 ML/MIN/1.73SQ M
GLUCOSE P FAST SERPL-MCNC: 103 MG/DL (ref 65–99)
HCT VFR BLD AUTO: 46.3 % (ref 36.5–49.3)
HDLC SERPL-MCNC: 75 MG/DL
HGB BLD-MCNC: 15.3 G/DL (ref 12–17)
IMM GRANULOCYTES # BLD AUTO: 0.02 THOUSAND/UL (ref 0–0.2)
IMM GRANULOCYTES NFR BLD AUTO: 0 % (ref 0–2)
LDLC SERPL CALC-MCNC: 157 MG/DL (ref 0–100)
LYMPHOCYTES # BLD AUTO: 2.27 THOUSANDS/ÂΜL (ref 0.6–4.47)
LYMPHOCYTES NFR BLD AUTO: 31 % (ref 14–44)
MCH RBC QN AUTO: 30.6 PG (ref 26.8–34.3)
MCHC RBC AUTO-ENTMCNC: 33 G/DL (ref 31.4–37.4)
MCV RBC AUTO: 93 FL (ref 82–98)
MONOCYTES # BLD AUTO: 0.65 THOUSAND/ÂΜL (ref 0.17–1.22)
MONOCYTES NFR BLD AUTO: 9 % (ref 4–12)
NEUTROPHILS # BLD AUTO: 4.26 THOUSANDS/ÂΜL (ref 1.85–7.62)
NEUTS SEG NFR BLD AUTO: 57 % (ref 43–75)
NONHDLC SERPL-MCNC: 173 MG/DL
NRBC BLD AUTO-RTO: 0 /100 WBCS
PLATELET # BLD AUTO: 285 THOUSANDS/UL (ref 149–390)
PMV BLD AUTO: 11 FL (ref 8.9–12.7)
POTASSIUM SERPL-SCNC: 5.7 MMOL/L (ref 3.5–5.3)
PROT SERPL-MCNC: 6.7 G/DL (ref 6.4–8.4)
PSA SERPL-MCNC: 1.83 NG/ML (ref 0–4)
RBC # BLD AUTO: 5 MILLION/UL (ref 3.88–5.62)
SODIUM SERPL-SCNC: 139 MMOL/L (ref 135–147)
TRIGL SERPL-MCNC: 78 MG/DL
TSH SERPL DL<=0.05 MIU/L-ACNC: 1.07 UIU/ML (ref 0.45–4.5)
WBC # BLD AUTO: 7.4 THOUSAND/UL (ref 4.31–10.16)

## 2024-02-14 PROCEDURE — 80053 COMPREHEN METABOLIC PANEL: CPT

## 2024-02-14 PROCEDURE — 36415 COLL VENOUS BLD VENIPUNCTURE: CPT

## 2024-02-14 PROCEDURE — G0103 PSA SCREENING: HCPCS

## 2024-02-14 PROCEDURE — 85025 COMPLETE CBC W/AUTO DIFF WBC: CPT

## 2024-02-14 PROCEDURE — 84443 ASSAY THYROID STIM HORMONE: CPT

## 2024-02-14 PROCEDURE — 80061 LIPID PANEL: CPT

## 2024-03-04 ENCOUNTER — OFFICE VISIT (OUTPATIENT)
Dept: FAMILY MEDICINE CLINIC | Facility: CLINIC | Age: 54
End: 2024-03-04
Payer: COMMERCIAL

## 2024-03-04 VITALS
OXYGEN SATURATION: 96 % | HEART RATE: 65 BPM | DIASTOLIC BLOOD PRESSURE: 70 MMHG | RESPIRATION RATE: 17 BRPM | SYSTOLIC BLOOD PRESSURE: 118 MMHG | BODY MASS INDEX: 20.64 KG/M2 | TEMPERATURE: 98.1 F | HEIGHT: 70 IN | WEIGHT: 144.2 LBS

## 2024-03-04 DIAGNOSIS — E87.5 HYPERKALEMIA: ICD-10-CM

## 2024-03-04 DIAGNOSIS — F11.20 CONTINUOUS OPIOID DEPENDENCE (HCC): ICD-10-CM

## 2024-03-04 DIAGNOSIS — G89.29 CHRONIC BILATERAL LOW BACK PAIN WITHOUT SCIATICA: ICD-10-CM

## 2024-03-04 DIAGNOSIS — M54.50 CHRONIC BILATERAL LOW BACK PAIN WITHOUT SCIATICA: ICD-10-CM

## 2024-03-04 DIAGNOSIS — Z71.2 ENCOUNTER TO DISCUSS TEST RESULTS: ICD-10-CM

## 2024-03-04 DIAGNOSIS — Z00.00 HEALTH CARE MAINTENANCE: Primary | ICD-10-CM

## 2024-03-04 DIAGNOSIS — N20.0 CALCULUS OF KIDNEY: ICD-10-CM

## 2024-03-04 PROCEDURE — 99396 PREV VISIT EST AGE 40-64: CPT | Performed by: NURSE PRACTITIONER

## 2024-03-04 RX ORDER — TRAMADOL HYDROCHLORIDE 50 MG/1
TABLET ORAL
Qty: 60 TABLET | Refills: 3 | Status: SHIPPED | OUTPATIENT
Start: 2024-03-04

## 2024-03-04 NOTE — PROGRESS NOTES
ADULT ANNUAL PHYSICAL  [facility] - [facility] [place]    NAME: Glynn Harper  AGE: 53 y.o. SEX: male  : 1970     DATE: 3/4/2024     Assessment and Plan:     Diagnoses and all orders for this visit:    Health care maintenance    Hyperkalemia  -     Basic metabolic panel; Future  -     Basic metabolic panel; Future    Continuous opioid dependence (HCC)    Chronic bilateral low back pain without sciatica  -     traMADol (ULTRAM) 50 mg tablet; 1 tablet every 12 hours as needed moderate pain    Calculus of kidney  -     Basic metabolic panel; Future    Encounter to discuss test results      53 yo male in good health  Drink more water  Recheck labs in 3 months    Health maintenance and preventative care screenings were discussed with patient today. Appropriate education was printed on patient's after visit summary.  Counseling:  Dental Health: discussed importance of regular tooth brushing, flossing, and dental visits.  Injury prevention: discussed safety/seat belts, safety helmets, smoke detectors, carbon dioxide detectors, and smoking near bedding or upholstery.  BMI Counseling: Body mass index is 20.69 kg/m². Discussed with patient's BMI with him. The BMI is in the acceptable range.  Sexual health: discussed sexually transmitted diseases, partner selection, use of condoms, avoidance of unintended pregnancy, and contraceptive alternatives.    Return in about 6 months (around 2024).     Chief Complaint:     Chief Complaint   Patient presents with    Annual Exam      History of Present Illness:     Adult Annual Physical   Patient here for a comprehensive physical exam.   The patient reports no problems - Denies chest pain, dizziness, syncope, dyspnea at rest.  No significant fam h/o CAD. former smoker. quit 1.5 years ago.    Diet and Physical Activity  Diet/Nutrition: well balanced diet.   Weight concerns: none, patient's BMI is between 18.5-24.9.  Exercise: walking.       Depression Screening  PHQ-2/9 Depression Screening    Little interest or pleasure in doing things: 0 - not at all  Feeling down, depressed, or hopeless: 0 - not at all  PHQ-2 Score: 0  PHQ-2 Interpretation: Negative depression screen       General Health  Sleep: sleeps well.   Hearing: normal - bilateral.  Vision: wears glasses.   Dental: regular dental visits.        Health  Erectile dysfunction: no.     Review of Systems:     Review of Systems   Musculoskeletal:  Positive for arthralgias and back pain.        Ongoing left thumb arthritis.    All other systems reviewed and are negative.     Past Medical History:     Past Medical History:   Diagnosis Date    Arthritis     left thumb    Kidney stone     right stone    PONV (postoperative nausea and vomiting) 2019    bad after ESWL      Past Surgical History:     Past Surgical History:   Procedure Laterality Date    EXTRACORPOREAL SHOCK WAVE LITHOTRIPSY      x2    EXTRACORPOREAL SHOCK WAVE LITHOTRIPSY      x3 times total    HERNIA REPAIR      Inguinal hernia repair, left     OTHER SURGICAL HISTORY N/A 1990    HAD SPLEEN REPAIR SURGERY, TRAUMATIC REPTURE REPAIR    [place] LITHOTRIPSY XTRCORP SHOCK WAVE Right 2019    Procedure: LITHOTRIPSY EXTRACORPORAL SHOCKWAVE (ESWL);  Surgeon: Fabian Cyr MD;  Location: [facility] MAIN OR;  Service: Urology    SPLENECTOMY, PARTIAL  1990    repaired, traumatic      Social History:     Social History     Socioeconomic History    Marital status: /Civil Union     Spouse name: None    Number of children: None    Years of education: None    Highest education level: None   Occupational History    None   Tobacco Use    Smoking status: Former     Current packs/day: 0.00     Average packs/day: 0.5 packs/day for 20.0 years (10.0 ttl pk-yrs)     Types: Cigarettes     Start date: 1997     Quit date: 2017     Years since quittin.7    Smokeless tobacco: Former    Tobacco comments:     chew   Vaping Use    Vaping  "status: Never Used   Substance and Sexual Activity    Alcohol use: Yes     Comment: rarely    Drug use: No    Sexual activity: Yes     Partners: Female     Birth control/protection: None   Other Topics Concern    None   Social History Narrative    History of current every day smoker - as per [employer]    Former smoker - as per [employer]      Social Determinants of Health     Financial Resource Strain: Not on file   Food Insecurity: Not on file   Transportation Needs: Not on file   Physical Activity: Not on file   Stress: Not on file   Social Connections: Not on file   Intimate Partner Violence: Not on file   Housing Stability: Not on file      Family History:     Family History   Problem Relation Age of Onset    Coronary artery disease Mother     Emphysema Mother         Emphysema/COPD    Skin cancer Mother     Emphysema Father         Emphysema/COPD    Heart attack Father         MI involving other coronary artery     Diabetes Paternal Uncle     Emphysema Family     Diabetes Family     Skin cancer Family     Nephrolithiasis Family     Breast cancer Sister       Current Medications:     Current Outpatient Medications   Medication Sig Dispense Refill    multivitamin (THERAGRAN) TABS Take 1 tablet by mouth daily      traMADol (ULTRAM) 50 mg tablet 1 tablet every 12 hours as needed moderate pain 60 tablet 3     No current facility-administered medications for this visit.      Allergies:     Allergies   Allergen Reactions    Ezetimibe Rash    Levofloxacin Rash    Penicillins Rash     Unsure of reaction    Statins Myalgia      Objective:     /70 (BP Location: Left arm, Patient Position: Sitting, Cuff Size: Standard)   Pulse 65   Temp 98.1 °F (36.7 °C) (Temporal)   Resp 17   Ht 5' 10\" (1.778 m)   Wt 65.4 kg (144 lb 3.2 oz)   SpO2 96%   BMI 20.69 kg/m²   Physical Exam  Vitals and nursing note reviewed.   Constitutional:       General: He is not in acute distress.     Appearance: Normal appearance. He is " well-developed.   HENT:      Head: Normocephalic and atraumatic.   Eyes:      General: Lids are normal.      Conjunctiva/sclera: Conjunctivae normal.      Pupils: Pupils are equal, round, and reactive to light.   Neck:      Thyroid: No thyromegaly.      Vascular: No carotid bruit.   Cardiovascular:      Rate and Rhythm: Normal rate and regular rhythm.      Pulses: Normal pulses.      Heart sounds: Normal heart sounds. No murmur heard.  Pulmonary:      Effort: Pulmonary effort is normal. No respiratory distress.      Breath sounds: Normal breath sounds.   Abdominal:      General: Bowel sounds are normal. There is no distension.      Palpations: Abdomen is soft.      Tenderness: There is no abdominal tenderness.   Genitourinary:     Comments: Decline exam  Musculoskeletal:         General: No deformity.        Back:       Right lower leg: No edema.      Left lower leg: No edema.   Lymphadenopathy:      Cervical: No cervical adenopathy.   Skin:     General: Skin is warm and dry.      Coloration: Skin is not pale.   Neurological:      General: No focal deficit present.      Mental Status: He is alert. Mental status is at baseline.      Sensory: No sensory deficit.      Motor: No weakness or abnormal muscle tone.      Coordination: Coordination normal.   Psychiatric:         Mood and Affect: Mood normal.         Behavior: Behavior normal.       Pulmonary Functions Testing Results:  Normal study     Health Maintenance:     Health Maintenance Topics with due status: Not Due       Topic Last Completion Date    DTaP,Tdap,and Td Vaccines 09/01/2017    Colorectal Cancer Screening 05/03/2021    Depression Screening 03/04/2024    Annual Physical 03/04/2024     Health Maintenance Topics with due status: Overdue       Topic Date Due    Pneumococcal Vaccine: Pediatrics (0 to 5 Years) and At-Risk Patients (6 to 64 Years) Never done    Zoster Vaccine Never done    Influenza Vaccine 09/01/2023    COVID-19 Vaccine Never done      Immunization History   Administered Date(s) Administered    Influenza, injectable, quadrivalent, preservative free 0.5 mL 11/04/2019    Tdap 09/01/2017       Marimar Stewart  [facility]

## 2024-07-10 DIAGNOSIS — M54.50 CHRONIC BILATERAL LOW BACK PAIN WITHOUT SCIATICA: ICD-10-CM

## 2024-07-10 DIAGNOSIS — G89.29 CHRONIC BILATERAL LOW BACK PAIN WITHOUT SCIATICA: ICD-10-CM

## 2024-07-11 RX ORDER — TRAMADOL HYDROCHLORIDE 50 MG/1
TABLET ORAL
Qty: 60 TABLET | Refills: 3 | Status: SHIPPED | OUTPATIENT
Start: 2024-07-11

## 2024-08-09 ENCOUNTER — APPOINTMENT (OUTPATIENT)
Dept: LAB | Facility: CLINIC | Age: 54
End: 2024-08-09
Payer: COMMERCIAL

## 2024-08-09 DIAGNOSIS — N20.0 CALCULUS OF KIDNEY: ICD-10-CM

## 2024-08-09 DIAGNOSIS — E87.5 HYPERKALEMIA: ICD-10-CM

## 2024-08-09 LAB
ANION GAP SERPL CALCULATED.3IONS-SCNC: 6 MMOL/L (ref 4–13)
BUN SERPL-MCNC: 17 MG/DL (ref 5–25)
CALCIUM SERPL-MCNC: 9.1 MG/DL (ref 8.4–10.2)
CHLORIDE SERPL-SCNC: 103 MMOL/L (ref 96–108)
CO2 SERPL-SCNC: 30 MMOL/L (ref 21–32)
CREAT SERPL-MCNC: 0.84 MG/DL (ref 0.6–1.3)
GFR SERPL CREATININE-BSD FRML MDRD: 99 ML/MIN/1.73SQ M
GLUCOSE P FAST SERPL-MCNC: 100 MG/DL (ref 65–99)
POTASSIUM SERPL-SCNC: 4.7 MMOL/L (ref 3.5–5.3)
SODIUM SERPL-SCNC: 139 MMOL/L (ref 135–147)

## 2024-08-09 PROCEDURE — 36415 COLL VENOUS BLD VENIPUNCTURE: CPT

## 2024-08-09 PROCEDURE — 80048 BASIC METABOLIC PNL TOTAL CA: CPT

## 2024-09-14 DIAGNOSIS — G89.29 CHRONIC BILATERAL LOW BACK PAIN WITHOUT SCIATICA: ICD-10-CM

## 2024-09-14 DIAGNOSIS — M54.50 CHRONIC BILATERAL LOW BACK PAIN WITHOUT SCIATICA: ICD-10-CM

## 2024-09-16 RX ORDER — TRAMADOL HYDROCHLORIDE 50 MG/1
50 TABLET ORAL EVERY 12 HOURS PRN
Qty: 60 TABLET | Refills: 3 | Status: SHIPPED | OUTPATIENT
Start: 2024-09-16

## 2024-11-24 DIAGNOSIS — M54.50 CHRONIC BILATERAL LOW BACK PAIN WITHOUT SCIATICA: ICD-10-CM

## 2024-11-24 DIAGNOSIS — G89.29 CHRONIC BILATERAL LOW BACK PAIN WITHOUT SCIATICA: ICD-10-CM

## 2024-11-25 RX ORDER — TRAMADOL HYDROCHLORIDE 50 MG/1
50 TABLET ORAL EVERY 12 HOURS PRN
Qty: 60 TABLET | Refills: 1 | Status: SHIPPED | OUTPATIENT
Start: 2024-11-25

## 2025-01-05 DIAGNOSIS — G89.29 CHRONIC BILATERAL LOW BACK PAIN WITHOUT SCIATICA: ICD-10-CM

## 2025-01-05 DIAGNOSIS — M54.50 CHRONIC BILATERAL LOW BACK PAIN WITHOUT SCIATICA: ICD-10-CM

## 2025-01-06 RX ORDER — TRAMADOL HYDROCHLORIDE 50 MG/1
50 TABLET ORAL EVERY 12 HOURS PRN
Qty: 60 TABLET | Refills: 3 | Status: SHIPPED | OUTPATIENT
Start: 2025-01-06

## 2025-02-07 DIAGNOSIS — G89.29 CHRONIC BILATERAL LOW BACK PAIN WITHOUT SCIATICA: ICD-10-CM

## 2025-02-07 DIAGNOSIS — M54.50 CHRONIC BILATERAL LOW BACK PAIN WITHOUT SCIATICA: ICD-10-CM

## 2025-02-07 RX ORDER — TRAMADOL HYDROCHLORIDE 50 MG/1
50 TABLET ORAL EVERY 12 HOURS PRN
Qty: 60 TABLET | Refills: 0 | Status: SHIPPED | OUTPATIENT
Start: 2025-02-07

## 2025-03-03 DIAGNOSIS — Z13.29 SCREENING FOR THYROID DISORDER: ICD-10-CM

## 2025-03-03 DIAGNOSIS — Z13.0 SCREENING, DEFICIENCY ANEMIA, IRON: ICD-10-CM

## 2025-03-03 DIAGNOSIS — E78.5 DYSLIPIDEMIA: Primary | ICD-10-CM

## 2025-03-03 DIAGNOSIS — Z13.1 SCREENING FOR DIABETES MELLITUS: ICD-10-CM

## 2025-03-03 DIAGNOSIS — Z12.5 PROSTATE CANCER SCREENING: ICD-10-CM

## 2025-03-04 ENCOUNTER — APPOINTMENT (OUTPATIENT)
Dept: LAB | Facility: CLINIC | Age: 55
End: 2025-03-04
Payer: COMMERCIAL

## 2025-03-04 DIAGNOSIS — Z12.5 PROSTATE CANCER SCREENING: ICD-10-CM

## 2025-03-04 DIAGNOSIS — Z13.0 SCREENING, DEFICIENCY ANEMIA, IRON: ICD-10-CM

## 2025-03-04 DIAGNOSIS — E78.5 DYSLIPIDEMIA: ICD-10-CM

## 2025-03-04 LAB
ALBUMIN SERPL BCG-MCNC: 4.7 G/DL (ref 3.5–5)
ALP SERPL-CCNC: 37 U/L (ref 34–104)
ALT SERPL W P-5'-P-CCNC: 22 U/L (ref 7–52)
ANION GAP SERPL CALCULATED.3IONS-SCNC: 8 MMOL/L (ref 4–13)
AST SERPL W P-5'-P-CCNC: 21 U/L (ref 13–39)
BASOPHILS # BLD AUTO: 0.05 THOUSANDS/ÂΜL (ref 0–0.1)
BASOPHILS NFR BLD AUTO: 1 % (ref 0–1)
BILIRUB SERPL-MCNC: 0.35 MG/DL (ref 0.2–1)
BUN SERPL-MCNC: 19 MG/DL (ref 5–25)
CALCIUM SERPL-MCNC: 9.6 MG/DL (ref 8.4–10.2)
CHLORIDE SERPL-SCNC: 102 MMOL/L (ref 96–108)
CHOLEST SERPL-MCNC: 270 MG/DL (ref ?–200)
CO2 SERPL-SCNC: 30 MMOL/L (ref 21–32)
CREAT SERPL-MCNC: 0.84 MG/DL (ref 0.6–1.3)
EOSINOPHIL # BLD AUTO: 0.15 THOUSAND/ÂΜL (ref 0–0.61)
EOSINOPHIL NFR BLD AUTO: 2 % (ref 0–6)
ERYTHROCYTE [DISTWIDTH] IN BLOOD BY AUTOMATED COUNT: 12.8 % (ref 11.6–15.1)
GFR SERPL CREATININE-BSD FRML MDRD: 99 ML/MIN/1.73SQ M
GLUCOSE P FAST SERPL-MCNC: 107 MG/DL (ref 65–99)
HCT VFR BLD AUTO: 44 % (ref 36.5–49.3)
HDLC SERPL-MCNC: 64 MG/DL
HGB BLD-MCNC: 14.2 G/DL (ref 12–17)
IMM GRANULOCYTES # BLD AUTO: 0.02 THOUSAND/UL (ref 0–0.2)
IMM GRANULOCYTES NFR BLD AUTO: 0 % (ref 0–2)
LDLC SERPL CALC-MCNC: 165 MG/DL (ref 0–100)
LYMPHOCYTES # BLD AUTO: 2.74 THOUSANDS/ÂΜL (ref 0.6–4.47)
LYMPHOCYTES NFR BLD AUTO: 40 % (ref 14–44)
MCH RBC QN AUTO: 30.2 PG (ref 26.8–34.3)
MCHC RBC AUTO-ENTMCNC: 32.3 G/DL (ref 31.4–37.4)
MCV RBC AUTO: 94 FL (ref 82–98)
MONOCYTES # BLD AUTO: 0.68 THOUSAND/ÂΜL (ref 0.17–1.22)
MONOCYTES NFR BLD AUTO: 10 % (ref 4–12)
NEUTROPHILS # BLD AUTO: 3.26 THOUSANDS/ÂΜL (ref 1.85–7.62)
NEUTS SEG NFR BLD AUTO: 47 % (ref 43–75)
NONHDLC SERPL-MCNC: 206 MG/DL
NRBC BLD AUTO-RTO: 0 /100 WBCS
PLATELET # BLD AUTO: 266 THOUSANDS/UL (ref 149–390)
PMV BLD AUTO: 10.8 FL (ref 8.9–12.7)
POTASSIUM SERPL-SCNC: 5 MMOL/L (ref 3.5–5.3)
PROT SERPL-MCNC: 6.9 G/DL (ref 6.4–8.4)
PSA SERPL-MCNC: 1.79 NG/ML (ref 0–4)
RBC # BLD AUTO: 4.7 MILLION/UL (ref 3.88–5.62)
SODIUM SERPL-SCNC: 140 MMOL/L (ref 135–147)
TRIGL SERPL-MCNC: 203 MG/DL (ref ?–150)
TSH SERPL DL<=0.05 MIU/L-ACNC: 1.26 UIU/ML (ref 0.45–4.5)
WBC # BLD AUTO: 6.9 THOUSAND/UL (ref 4.31–10.16)

## 2025-03-04 PROCEDURE — 84443 ASSAY THYROID STIM HORMONE: CPT | Performed by: NURSE PRACTITIONER

## 2025-03-04 PROCEDURE — 80061 LIPID PANEL: CPT

## 2025-03-04 PROCEDURE — G0103 PSA SCREENING: HCPCS

## 2025-03-04 PROCEDURE — 85025 COMPLETE CBC W/AUTO DIFF WBC: CPT

## 2025-03-04 PROCEDURE — 36415 COLL VENOUS BLD VENIPUNCTURE: CPT

## 2025-03-04 PROCEDURE — 80053 COMPREHEN METABOLIC PANEL: CPT | Performed by: NURSE PRACTITIONER

## 2025-03-05 ENCOUNTER — RESULTS FOLLOW-UP (OUTPATIENT)
Dept: FAMILY MEDICINE CLINIC | Facility: CLINIC | Age: 55
End: 2025-03-05

## 2025-03-07 ENCOUNTER — OFFICE VISIT (OUTPATIENT)
Dept: FAMILY MEDICINE CLINIC | Facility: CLINIC | Age: 55
End: 2025-03-07
Payer: COMMERCIAL

## 2025-03-07 VITALS
BODY MASS INDEX: 22.48 KG/M2 | HEART RATE: 107 BPM | OXYGEN SATURATION: 98 % | RESPIRATION RATE: 17 BRPM | SYSTOLIC BLOOD PRESSURE: 118 MMHG | TEMPERATURE: 98.3 F | HEIGHT: 70 IN | WEIGHT: 157 LBS | DIASTOLIC BLOOD PRESSURE: 70 MMHG

## 2025-03-07 DIAGNOSIS — Z00.00 ANNUAL PHYSICAL EXAM: Primary | ICD-10-CM

## 2025-03-07 DIAGNOSIS — F11.20 CONTINUOUS OPIOID DEPENDENCE (HCC): ICD-10-CM

## 2025-03-07 DIAGNOSIS — E78.5 DYSLIPIDEMIA: ICD-10-CM

## 2025-03-07 PROCEDURE — 99396 PREV VISIT EST AGE 40-64: CPT | Performed by: NURSE PRACTITIONER

## 2025-03-07 NOTE — PROGRESS NOTES
Adult Annual Physical  Name: Glynn Harper      : 1970      MRN: [id number]  Encounter Provider: ELIESER Wasserman  Encounter Date: 3/7/2025   Encounter department: [facility]IMARY [facility] [place]    Assessment & Plan  Annual physical exam  Declines statin-will work on diet-recheck lipids 6 months         Immunizations and preventive care screenings were discussed with patient today. Appropriate education was printed on patient's after visit summary.        Counseling:  Dental Health: discussed importance of regular tooth brushing, flossing, and dental visits.  Injury prevention: discussed safety/seat belts, safety helmets, smoke detectors, carbon monoxide detectors, and smoking near bedding or upholstery.  Exercise: the importance of regular exercise/physical activity was discussed. Recommend exercise 3-5 times per week for at least 30 minutes.       Depression Screening and Follow-up Plan: Patient was screened for depression during today's encounter. They screened negative with a PHQ-2 score of 0.          History of Present Illness     Adult Annual Physical:  Patient presents for annual physical. Reviewed labs  Trig elev-admits to higher fat in diet-       .     Diet and Physical Activity:  - Diet/Nutrition: well balanced diet and limited junk food. has been eating more butter  - Exercise: walking.    Depression Screening:  - PHQ-2 Score: 0    General Health:  - Sleep: sleeps well.  - Hearing: normal hearing bilateral ears.  - Vision: wears glasses and goes for regular eye exams.  - Dental: regular dental visits.     Health:  - History of STDs: no.   - Urinary symptoms: none.     Review of Systems   Musculoskeletal:  Positive for arthralgias and back pain.        Ongoing left thumb arthritis.    All other systems reviewed and are negative.        Objective   /70 (BP Location: Left arm, Patient Position: Sitting, Cuff Size: Large)   Pulse (!) 107   Temp 98.3 °F (36.8 °C) (Temporal)   Resp  "17   Ht 5' 10\" (1.778 m)   Wt 71.2 kg (157 lb)   SpO2 98%   BMI 22.53 kg/m²     Physical Exam  Vitals and nursing note reviewed.   Constitutional:       General: He is not in acute distress.     Appearance: Normal appearance. He is well-developed.   HENT:      Head: Normocephalic and atraumatic.   Eyes:      General: Lids are normal.      Conjunctiva/sclera: Conjunctivae normal.      Pupils: Pupils are equal, round, and reactive to light.   Neck:      Thyroid: No thyromegaly.      Vascular: No carotid bruit.   Cardiovascular:      Rate and Rhythm: Normal rate and regular rhythm.      Pulses: Normal pulses.      Heart sounds: Normal heart sounds. No murmur heard.  Pulmonary:      Effort: Pulmonary effort is normal. No respiratory distress.      Breath sounds: Normal breath sounds.   Abdominal:      General: Bowel sounds are normal. There is no distension.      Palpations: Abdomen is soft.      Tenderness: There is no abdominal tenderness.   Genitourinary:     Comments: Decline exam  Musculoskeletal:         General: No deformity.        Back:       Right lower leg: No edema.      Left lower leg: No edema.   Lymphadenopathy:      Cervical: No cervical adenopathy.   Skin:     General: Skin is warm and dry.      Coloration: Skin is not pale.   Neurological:      General: No focal deficit present.      Mental Status: He is alert. Mental status is at baseline.      Sensory: No sensory deficit.      Motor: No weakness or abnormal muscle tone.      Coordination: Coordination normal.   Psychiatric:         Mood and Affect: Mood normal.         Behavior: Behavior normal.         "

## 2025-03-07 NOTE — PATIENT INSTRUCTIONS
"Patient Education     Routine physical for adults   The Basics   Written by the doctors and editors at [facility]   What is a physical? -- A physical is a routine visit, or \"check-up,\" with your doctor. You might also hear it called a \"wellness visit\" or \"preventive visit.\"  During each visit, the doctor will:   Ask about your physical and mental health   Ask about your habits, behaviors, and lifestyle   Do an exam   Give you vaccines if needed   Talk to you about any medicines you take   Give advice about your health   Answer your questions  Getting regular check-ups is an important part of taking care of your health. It can help your doctor find and treat any problems you have. But it's also important for preventing health problems.  A routine physical is different from a \"sick visit.\" A sick visit is when you see a doctor because of a health concern or problem. Since physicals are scheduled ahead of time, you can think about what you want to ask the doctor.  How often should I get a physical? -- It depends on your age and health. In general, for people age 21 years and older:   If you are younger than 50 years, you might be able to get a physical every 3 years.   If you are 50 years or older, your doctor might recommend a physical every year.  If you have an ongoing health condition, like diabetes or high blood pressure, your doctor will probably want to see you more often.  What happens during a physical? -- In general, each visit will include:   Physical exam - The doctor or nurse will check your height, weight, heart rate, and blood pressure. They will also look at your eyes and ears. They will ask about how you are feeling and whether you have any symptoms that bother you.   Medicines - It's a good idea to bring a list of all the medicines you take to each doctor visit. Your doctor will talk to you about your medicines and answer any questions. Tell them if you are having any side effects that bother you. You " "should also tell them if you are having trouble paying for any of your medicines.   Habits and behaviors - This includes:   Your diet   Your exercise habits   Whether you smoke, drink alcohol, or use drugs   Whether you are sexually active   Whether you feel safe at home  Your doctor will talk to you about things you can do to improve your health and lower your risk of health problems. They will also offer help and support. For example, if you want to quit smoking, they can give you advice and might prescribe medicines. If you want to improve your diet or get more physical activity, they can help you with this, too.   Lab tests, if needed - The tests you get will depend on your age and situation. For example, your doctor might want to check your:   Cholesterol   Blood sugar   Iron level   Vaccines - The recommended vaccines will depend on your age, health, and what vaccines you already had. Vaccines are very important because they can prevent certain serious or deadly infections.   Discussion of screening - \"Screening\" means checking for diseases or other health problems before they cause symptoms. Your doctor can recommend screening based on your age, risk, and preferences. This might include tests to check for:   Cancer, such as breast, prostate, cervical, ovarian, colorectal, prostate, lung, or skin cancer   Sexually transmitted infections, such as chlamydia and gonorrhea   Mental health conditions like depression and anxiety  Your doctor will talk to you about the different types of screening tests. They can help you decide which screenings to have. They can also explain what the results might mean.   Answering questions - The physical is a good time to ask the doctor or nurse questions about your health. If needed, they can refer you to other doctors or specialists, too.  Adults older than 65 years often need other care, too. As you get older, your doctor will talk to you about:   How to prevent falling at " home   Hearing or vision tests   Memory testing   How to take your medicines safely   Making sure that you have the help and support you need at home  All topics are updated as new evidence becomes available and our peer review process is complete.  This topic retrieved from [employer] on: May 02, 2024.  Topic 307930 Version 1.0  Release: 32.4.3 - C32.122  © 2024 UpToDate, Inc. and/or its affiliates. All rights reserved.  Consumer Information Use and Disclaimer   Disclaimer: This generalized information is a limited summary of diagnosis, treatment, and/or medication information. It is not meant to be comprehensive and should be used as a tool to help the user understand and/or assess potential diagnostic and treatment options. It does NOT include all information about conditions, treatments, medications, side effects, or risks that may apply to a specific patient. It is not intended to be medical advice or a substitute for the medical advice, diagnosis, or treatment of a health care provider based on the health care provider's examination and assessment of a patient's specific and unique circumstances. Patients must speak with a health care provider for complete information about their health, medical questions, and treatment options, including any risks or benefits regarding use of medications. This information does not endorse any treatments or medications as safe, effective, or approved for treating a specific patient. UpToDate, Inc. and its affiliates disclaim any warranty or liability relating to this information or the use thereof.The use of this information is governed by the Terms of Use, available at https://www.woltersScopisuwer.com/en/know/clinical-effectiveness-terms. 2024© UpToDate, Inc. and its affiliates and/or licensors. All rights reserved.  Copyright   © 2024 UpToDate, Inc. and/or its affiliates. All rights reserved.

## 2025-03-12 ENCOUNTER — NURSE TRIAGE (OUTPATIENT)
Age: 55
End: 2025-03-12

## 2025-03-12 DIAGNOSIS — R04.0 EPISTAXIS: Primary | ICD-10-CM

## 2025-03-12 NOTE — TELEPHONE ENCOUNTER
Patient called in to make appt at [facility] for nose bleeds, per decision tree, call transferred to CTS

## 2025-03-12 NOTE — TELEPHONE ENCOUNTER
"FOLLOW UP: Scheduled appointment 3/19/25 at 3:45pm with Yolande Rodríguez.     REASON FOR CONVERSATION: nose bleeds    SYMPTOMS: Right nostril bleeding off and on (3 or 4x in the last 10 days) stops with pressure after several minutes.     OTHER: Using humidifier. Hx septum surgery years ago.    DISPOSITION: Information only.     Reason for Disposition   [1] Mild-moderate nosebleed AND [2] bleeding stopped now    Answer Assessment - Initial Assessment Questions  1. AMOUNT OF BLEEDING: \"How bad is the bleeding?\" \"How much blood was lost?\" \"Has the bleeding stopped?\"     It has stopped. Usually stops after a few minutes.   2. ONSET: \"When did the nosebleed start?\"       Last nosebleed this AM.   3. FREQUENCY: \"How many nosebleeds have you had in the last 24 hours?\"       1  4. RECURRENT SYMPTOMS: \"Have there been other recent nosebleeds?\" If Yes, ask: \"How long did it take you to stop the bleeding?\" \"What worked best?\"       On and off for the last 2.5 weeks   5. CAUSE: \"What do you think caused this nosebleed?\"      Unsure, has hx of septal surgery.   6. LOCAL FACTORS: \"Do you have any cold symptoms?\", \"Have you been rubbing or picking at your nose?\"      Denies   7. SYSTEMIC FACTORS: \"Do you have high blood pressure or any bleeding problems?\"      Denies   8. BLOOD THINNERS: \"Do you take any blood thinners?\" (e.g., aspirin, clopidogrel / Plavix, coumadin, heparin). Notes: Other strong blood thinners include: Arixtra (fondaparinux), Eliquis (apixaban), Pradaxa (dabigatran), and Xarelto (rivaroxaban).      Denies   9. OTHER SYMPTOMS: \"Do you have any other symptoms?\" (e.g., lightheadedness)      Denies   10. PREGNANCY: \"Is there any chance you are pregnant?\" \"When was your last menstrual period?\"        N/A    Protocols used: Nosebleed-Adult-AH    "

## 2025-03-19 ENCOUNTER — OFFICE VISIT (OUTPATIENT)
Dept: OTOLARYNGOLOGY | Facility: CLINIC | Age: 55
End: 2025-03-19
Payer: COMMERCIAL

## 2025-03-19 VITALS
WEIGHT: 157 LBS | HEIGHT: 70 IN | BODY MASS INDEX: 22.48 KG/M2 | HEART RATE: 65 BPM | TEMPERATURE: 98.1 F | OXYGEN SATURATION: 98 %

## 2025-03-19 DIAGNOSIS — J34.89 NASAL VESTIBULITIS: Primary | ICD-10-CM

## 2025-03-19 DIAGNOSIS — R04.0 RECURRENT EPISTAXIS: ICD-10-CM

## 2025-03-19 PROCEDURE — 99203 OFFICE O/P NEW LOW 30 MIN: CPT | Performed by: NURSE PRACTITIONER

## 2025-03-19 NOTE — PATIENT INSTRUCTIONS
Saline spray then apply antibiotic ointment 3 to 4 times daily.  Continue humidifier to rooms.  Limit bending forward.

## 2025-03-19 NOTE — PROGRESS NOTES
:  Assessment & Plan  Nasal vestibulitis         Recurrent epistaxis           Discussed causes of epistaxis including ASA, bleeding disorder, viral illness with nasal congestion, and nasal dryness. Reviewed his history of nasal cautery that was performed in OR in 2007.     On exam, Right nasal mucosa irritation and abrasion with crusting.  No prominent vessels noted although may be within the areas of abrasion and crusting. Also on right mid septum and extending to floor there are 3 small ulcers noted.     Discussed options related to nasal dryness and crusting including saline gels, saline sprays, saline rinses, ointments, vs nasal cautery with silver nitrate.  Discussed use of antibiotic ointment 3 to 4 times daily with saline sprays 2 to 3 times daily.  Continue humidifier to rooms.  Limit bending forward.  Agreed to medication options  Follow up in 4 weeks, possible nasal cautery if epistaxis persists. Contact office for uncontrollable bleeding or proceed to ER.       History of Present Illness     Glynn Harper is a 54 y.o. male   Presents today as a new patient due to nasal concerns.    Recurrent nose bleeds. Past 3 weeks approx 6 episodes. No recent nasal trauma.   No Blood thinners or supplements. Denies HTN  No Nasal sprays. Humidifier in bedroom for past week. Using Bacitracin as needed to nostrils.   History nasal cautery that was completed in the OR in 2007. No other history sinus surgery              Review of Systems   Constitutional: Negative.    HENT:  Positive for nosebleeds. Negative for congestion, ear discharge, ear pain, hearing loss, postnasal drip, rhinorrhea, sinus pressure, sinus pain, sore throat, tinnitus and voice change.    Respiratory:  Negative for chest tightness and shortness of breath.    Skin:  Negative for color change.   Neurological:  Negative for dizziness, numbness and headaches.   Psychiatric/Behavioral: Negative.       Objective   Pulse 65   Temp 98.1 °F (36.7 °C)  "(Temporal)   Ht 5' 10\" (1.778 m)   Wt 71.2 kg (157 lb)   SpO2 98%   BMI 22.53 kg/m²      Physical Exam  Vitals and nursing note reviewed.   Constitutional:       General: He is not in acute distress.     Appearance: He is well-developed.   HENT:      Head: Normocephalic and atraumatic.      Right Ear: Tympanic membrane, ear canal and external ear normal.      Left Ear: Tympanic membrane, ear canal and external ear normal.      Nose: Septal deviation present.      Mouth/Throat:      Mouth: Mucous membranes are moist.   Pulmonary:      Effort: Pulmonary effort is normal.   Musculoskeletal:      Cervical back: Neck supple.   Skin:     General: Skin is warm and dry.   Neurological:      Mental Status: He is alert.   Psychiatric:         Mood and Affect: Mood normal.           "

## 2025-05-14 ENCOUNTER — APPOINTMENT (OUTPATIENT)
Dept: RADIOLOGY | Facility: CLINIC | Age: 55
End: 2025-05-14
Attending: FAMILY MEDICINE
Payer: COMMERCIAL

## 2025-05-14 ENCOUNTER — HOSPITAL ENCOUNTER (EMERGENCY)
Facility: HOSPITAL | Age: 55
Discharge: HOME | End: 2025-05-14
Attending: EMERGENCY MEDICINE
Payer: COMMERCIAL

## 2025-05-14 ENCOUNTER — APPOINTMENT (EMERGENCY)
Dept: RADIOLOGY | Facility: HOSPITAL | Age: 55
End: 2025-05-14
Payer: COMMERCIAL

## 2025-05-14 ENCOUNTER — OFFICE VISIT (OUTPATIENT)
Dept: URGENT CARE | Facility: CLINIC | Age: 55
End: 2025-05-14

## 2025-05-14 VITALS
HEIGHT: 70 IN | WEIGHT: 155.2 LBS | BODY MASS INDEX: 22.22 KG/M2 | HEART RATE: 61 BPM | OXYGEN SATURATION: 98 % | TEMPERATURE: 97.6 F | DIASTOLIC BLOOD PRESSURE: 79 MMHG | SYSTOLIC BLOOD PRESSURE: 124 MMHG | RESPIRATION RATE: 19 BRPM

## 2025-05-14 VITALS
HEART RATE: 67 BPM | RESPIRATION RATE: 16 BRPM | SYSTOLIC BLOOD PRESSURE: 109 MMHG | DIASTOLIC BLOOD PRESSURE: 69 MMHG | OXYGEN SATURATION: 96 % | TEMPERATURE: 97.4 F

## 2025-05-14 DIAGNOSIS — R07.9 CHEST PAIN, UNSPECIFIED TYPE: ICD-10-CM

## 2025-05-14 DIAGNOSIS — M25.511 ACUTE PAIN OF RIGHT SHOULDER: Primary | ICD-10-CM

## 2025-05-14 DIAGNOSIS — M25.511 ACUTE PAIN OF RIGHT SHOULDER: ICD-10-CM

## 2025-05-14 DIAGNOSIS — R07.81 RIB PAIN ON LEFT SIDE: ICD-10-CM

## 2025-05-14 DIAGNOSIS — R07.9 CHEST PAIN: Primary | ICD-10-CM

## 2025-05-14 LAB
ALBUMIN SERPL BCG-MCNC: 4.6 G/DL (ref 3.5–5)
ALP SERPL-CCNC: 29 U/L (ref 34–104)
ALT SERPL W P-5'-P-CCNC: 17 U/L (ref 7–52)
ANION GAP SERPL CALCULATED.3IONS-SCNC: 9 MMOL/L (ref 4–13)
AST SERPL W P-5'-P-CCNC: 18 U/L (ref 13–39)
BASOPHILS # BLD MANUAL: 0.09 THOUSAND/UL (ref 0–0.1)
BASOPHILS NFR MAR MANUAL: 1 % (ref 0–1)
BILIRUB SERPL-MCNC: 0.52 MG/DL (ref 0.2–1)
BUN SERPL-MCNC: 17 MG/DL (ref 5–25)
CALCIUM SERPL-MCNC: 9.5 MG/DL (ref 8.4–10.2)
CARDIAC TROPONIN I PNL SERPL HS: <2 NG/L (ref ?–50)
CHLORIDE SERPL-SCNC: 102 MMOL/L (ref 96–108)
CO2 SERPL-SCNC: 28 MMOL/L (ref 21–32)
CREAT SERPL-MCNC: 1.08 MG/DL (ref 0.6–1.3)
EOSINOPHIL # BLD MANUAL: 0.17 THOUSAND/UL (ref 0–0.4)
EOSINOPHIL NFR BLD MANUAL: 2 % (ref 0–6)
ERYTHROCYTE [DISTWIDTH] IN BLOOD BY AUTOMATED COUNT: 12.9 % (ref 11.6–15.1)
GFR SERPL CREATININE-BSD FRML MDRD: 77 ML/MIN/1.73SQ M
GLUCOSE SERPL-MCNC: 86 MG/DL (ref 65–140)
HCT VFR BLD AUTO: 46.7 % (ref 36.5–49.3)
HGB BLD-MCNC: 15.6 G/DL (ref 12–17)
LYMPHOCYTES # BLD AUTO: 4.44 THOUSAND/UL (ref 0.6–4.47)
LYMPHOCYTES # BLD AUTO: 48 % (ref 14–44)
MCH RBC QN AUTO: 30.8 PG (ref 26.8–34.3)
MCHC RBC AUTO-ENTMCNC: 33.4 G/DL (ref 31.4–37.4)
MCV RBC AUTO: 92 FL (ref 82–98)
MONOCYTES # BLD AUTO: 0.51 THOUSAND/UL (ref 0–1.22)
MONOCYTES NFR BLD: 6 % (ref 4–12)
NEUTROPHILS # BLD MANUAL: 3.33 THOUSAND/UL (ref 1.85–7.62)
NEUTS BAND NFR BLD MANUAL: 3 % (ref 0–8)
NEUTS SEG NFR BLD AUTO: 36 % (ref 43–75)
PLATELET # BLD AUTO: 276 THOUSANDS/UL (ref 149–390)
PLATELET BLD QL SMEAR: ADEQUATE
PMV BLD AUTO: 10.5 FL (ref 8.9–12.7)
POTASSIUM SERPL-SCNC: 4 MMOL/L (ref 3.5–5.3)
PROT SERPL-MCNC: 7.1 G/DL (ref 6.4–8.4)
RBC # BLD AUTO: 5.07 MILLION/UL (ref 3.88–5.62)
RBC MORPH BLD: NORMAL
SMUDGE CELLS BLD QL SMEAR: PRESENT
SODIUM SERPL-SCNC: 139 MMOL/L (ref 135–147)
VARIANT LYMPHS # BLD AUTO: 4 %
WBC # BLD AUTO: 8.53 THOUSAND/UL (ref 4.31–10.16)

## 2025-05-14 PROCEDURE — 85007 BL SMEAR W/DIFF WBC COUNT: CPT | Performed by: EMERGENCY MEDICINE

## 2025-05-14 PROCEDURE — 84484 ASSAY OF TROPONIN QUANT: CPT | Performed by: EMERGENCY MEDICINE

## 2025-05-14 PROCEDURE — 71101 X-RAY EXAM UNILAT RIBS/CHEST: CPT

## 2025-05-14 PROCEDURE — 36415 COLL VENOUS BLD VENIPUNCTURE: CPT | Performed by: EMERGENCY MEDICINE

## 2025-05-14 PROCEDURE — 85027 COMPLETE CBC AUTOMATED: CPT | Performed by: EMERGENCY MEDICINE

## 2025-05-14 PROCEDURE — 99285 EMERGENCY DEPT VISIT HI MDM: CPT | Performed by: EMERGENCY MEDICINE

## 2025-05-14 PROCEDURE — 80053 COMPREHEN METABOLIC PANEL: CPT | Performed by: EMERGENCY MEDICINE

## 2025-05-14 PROCEDURE — 73030 X-RAY EXAM OF SHOULDER: CPT

## 2025-05-14 PROCEDURE — 93005 ELECTROCARDIOGRAM TRACING: CPT

## 2025-05-14 PROCEDURE — 96374 THER/PROPH/DIAG INJ IV PUSH: CPT

## 2025-05-14 PROCEDURE — 99285 EMERGENCY DEPT VISIT HI MDM: CPT

## 2025-05-14 RX ORDER — KETOROLAC TROMETHAMINE 30 MG/ML
15 INJECTION, SOLUTION INTRAMUSCULAR; INTRAVENOUS ONCE
Status: COMPLETED | OUTPATIENT
Start: 2025-05-14 | End: 2025-05-14

## 2025-05-14 RX ADMIN — KETOROLAC TROMETHAMINE 15 MG: 30 INJECTION, SOLUTION INTRAMUSCULAR; INTRAVENOUS at 22:03

## 2025-05-14 NOTE — Clinical Note
Glynn Harper was seen and treated in our emergency department on 5/14/2025.                Diagnosis:     Glynn  .    He may return on this date:          If you have any questions or concerns, please don't hesitate to call.      Juanita Peralta MD    ______________________________           _______________          _______________  Hospital Representative                              Date                                Time

## 2025-05-15 LAB
ATRIAL RATE: 71 BPM
P AXIS: 78 DEGREES
PR INTERVAL: 192 MS
QRS AXIS: 60 DEGREES
QRSD INTERVAL: 86 MS
QT INTERVAL: 364 MS
QTC INTERVAL: 395 MS
T WAVE AXIS: 64 DEGREES
VENTRICULAR RATE: 71 BPM

## 2025-05-15 PROCEDURE — 93010 ELECTROCARDIOGRAM REPORT: CPT | Performed by: INTERNAL MEDICINE

## 2025-05-15 NOTE — ED PROVIDER NOTES
Time reflects when diagnosis was documented in both MDM as applicable and the Disposition within this note       Time User Action Codes Description Comment    5/14/2025 10:48 PM Juanita Peralta Add [R07.9] Chest pain           ED Disposition       ED Disposition   Discharge    Condition   Stable    Date/Time   Wed May 14, 2025 10:48 PM    Comment   Glynn Leroy discharge to home/self care.                   Assessment & Plan       Medical Decision Making  Pt is a 55yo M who presents with chest pain.     Differential diagnosis to include but not limited to costochondritis, ACS, arrhythmia.  Will plan for cardiac w/u including trop, EKG, and CXR. See ED course for results and details.    Plan to discharge pt with f/u to PCP and cardiology. Discussed returning the ED with new or worsening of symptoms. Discussed use of over the counter medications as stated on the bottle as needed for pain. Pt expressed understanding of discharge instructions, return precautions, and medication instructions and is stable for discharge at this time. All questions were answered and pt was discharged without incident.           Amount and/or Complexity of Data Reviewed  Labs: ordered. Decision-making details documented in ED Course.  Radiology: ordered. Decision-making details documented in ED Course.  ECG/medicine tests:  Decision-making details documented in ED Course.    Risk  Prescription drug management.        ED Course as of 05/14/25 2252   Wed May 14, 2025   2201 ECG 12 lead  Procedure Note: EKG  Date/Time: 05/14/25 10:01 PM   Interpreted by: Juanita Peralta MD  Indications / Diagnosis: CP  ECG reviewed by me, the ED Physician: yes   The EKG demonstrates:  Rhythm: normal sinus with PACs  Intervals: normal intervals  Axis: normal axis  QRS/Blocks: normal QRS  ST Changes: No acute ST Changes, no STD/SARAH.     2209 CBC and differential  Reviewed and without actionable derangement.    2234 Comprehensive metabolic  "panel(!)  Reviewed and without actionable derangement.    2237 hs TnI 0hr: <2  Negative.    2237 XR chest 1 view portable  Pt with CXR at [facility] that was read as \"No acute cardiopulmonary disease.\" No indication to repeat at this time.    2248 Pt reassessed and states that pain has resolved following pain medication. Pt made aware of all results and plan for DC. Pt agreeable.        Medications   ketorolac (TORADOL) injection 15 mg (15 mg Intravenous Given 5/14/25 2203)       ED Risk Strat Scores   HEART Risk Score      Flowsheet Row Most Recent Value   Heart Score Risk Calculator    History 0 Filed at: 05/14/2025 2248   ECG 0 Filed at: 05/14/2025 2248   Age 1 Filed at: 05/14/2025 2248   Risk Factors 1 Filed at: 05/14/2025 2248   Troponin 0 Filed at: 05/14/2025 2248   HEART Score 2 Filed at: 05/14/2025 2248          HEART Risk Score      Flowsheet Row Most Recent Value   Heart Score Risk Calculator    History 0 Filed at: 05/14/2025 2248   ECG 0 Filed at: 05/14/2025 2248   Age 1 Filed at: 05/14/2025 2248   Risk Factors 1 Filed at: 05/14/2025 2248   Troponin 0 Filed at: 05/14/2025 2248   HEART Score 2 Filed at: 05/14/2025 2248                      No data recorded                            History of Present Illness       Chief Complaint   Patient presents with    Chest Pain     States started with chest discomfort today, went to urgent care for same. States had a normal EKG but they told him  to come here but he waited       Past Medical History:   Diagnosis Date    Arthritis     left thumb    Kidney stone     right stone    Nosebleed February    PONV (postoperative nausea and vomiting) 2019    bad after ESWL      Past Surgical History:   Procedure Laterality Date    EXTRACORPOREAL SHOCK WAVE LITHOTRIPSY      x2    EXTRACORPOREAL SHOCK WAVE LITHOTRIPSY      x3 times total    HERNIA REPAIR  2012    Inguinal hernia repair, left     OTHER SURGICAL HISTORY N/A 01/11/1990    HAD SPLEEN REPAIR SURGERY, TRAUMATIC REPTURE " "REPAIR    IL LITHOTRIPSY XTRCORP SHOCK WAVE Right 12/11/2019    Procedure: LITHOTRIPSY EXTRACORPORAL SHOCKWAVE (ESWL);  Surgeon: Fabian Cyr MD;  Location: [facility] MAIN OR;  Service: Urology    SPLENECTOMY, PARTIAL  1990    repaired, traumatic      Family History   Problem Relation Age of Onset    Coronary artery disease Mother     Emphysema Mother         Emphysema/COPD    Skin cancer Mother     Emphysema Father         Emphysema/COPD    Heart attack Father         MI involving other coronary artery     Diabetes Paternal Uncle     Emphysema Family     Diabetes Family     Skin cancer Family     Nephrolithiasis Family     Breast cancer Sister       Social History[1]   E-Cigarette/Vaping    E-Cigarette Use Never User       E-Cigarette/Vaping Substances    Nicotine No     THC No     CBD No     Flavoring No     Other No     Unknown No       I have reviewed and agree with the history as documented.     Pt is a 53yo M who presents for chest pain.  Patient reports he has been having central chest pain for approximately 3 weeks.  Patient reports he went to urgent care today because he \"had a break in work\".  Patient reports that he had a normal EKG at urgent care but was recommended to come to the ED for further evaluation.  Patient states that earlier in the day the pain was a 6 out of 10 but now it is a 4 out of 10.  Patient has not been taking anything at home for the pain.  Patient denies any trauma or injury to his chest.  Patient denies any history of cardiac problems.  Patient states he is otherwise healthy and follows regularly with a doctor.  Patient denies any other associated symptoms including shortness of breath, palpitations, lower extremity swelling.          Objective       ED Triage Vitals [05/14/25 2154]   Temperature Pulse Blood Pressure Respirations SpO2 Patient Position - Orthostatic VS   (!) 97.4 °F (36.3 °C) 73 157/83 (!) 24 97 % Sitting      Temp Source Heart Rate Source BP Location FiO2 (%) " Pain Score    Tympanic Monitor Right arm -- 4      Vitals      Date and Time Temp Pulse SpO2 Resp BP Pain Score FACES Pain Rating User   05/14/25 2203 -- -- -- -- -- 2 -- SS   05/14/25 2154 97.4 °F (36.3 °C) 73 97 % 24 157/83 4 -- LS            Physical Exam  Vitals reviewed.   Constitutional:       General: He is not in acute distress.     Appearance: He is well-developed. He is not toxic-appearing or diaphoretic.   HENT:      Head: Normocephalic and atraumatic.      Right Ear: External ear normal.      Left Ear: External ear normal.      Nose: Nose normal.      Mouth/Throat:      Pharynx: Oropharynx is clear.     Eyes:      Extraocular Movements: Extraocular movements intact.      Pupils: Pupils are equal, round, and reactive to light.       Cardiovascular:      Rate and Rhythm: Normal rate and regular rhythm.      Heart sounds: Normal heart sounds.   Pulmonary:      Effort: Pulmonary effort is normal. No respiratory distress.      Breath sounds: Normal breath sounds.   Chest:      Chest wall: No tenderness.   Abdominal:      General: Bowel sounds are normal. There is no distension.      Palpations: Abdomen is soft.      Tenderness: There is no abdominal tenderness.     Musculoskeletal:         General: Normal range of motion.      Cervical back: Normal range of motion and neck supple.      Right lower leg: No edema.      Left lower leg: No edema.     Skin:     General: Skin is warm and dry.      Capillary Refill: Capillary refill takes less than 2 seconds.      Coloration: Skin is not pale.     Neurological:      General: No focal deficit present.      Mental Status: He is alert and oriented to person, place, and time.     Psychiatric:         Speech: Speech normal.         Behavior: Behavior is cooperative.         Results Reviewed       Procedure Component Value Units Date/Time    CBC and differential [[id number]]  (Normal) Collected: 05/14/25 2202    Lab Status: Final result Specimen: Blood from Arm, Left  Updated: 05/14/25 2239     WBC 8.53 Thousand/uL      RBC 5.07 Million/uL      Hemoglobin 15.6 g/dL      Hematocrit 46.7 %      MCV 92 fL      MCH 30.8 pg      MCHC 33.4 g/dL      RDW 12.9 %      MPV 10.5 fL      Platelets 276 Thousands/uL     Narrative:      This is an appended report.  These results have been appended to a previously verified report.    Manual Differential(PHLEBS Do Not Order) [[id number]]  (Abnormal) Collected: 05/14/25 2202    Lab Status: Final result Specimen: Blood from Arm, Left Updated: 05/14/25 2239     Segmented % 36 %      Bands % 3 %      Lymphocytes % 48 %      Monocytes % 6 %      Eosinophils % 2 %      Basophils % 1 %      Atypical Lymphocytes % 4 %      Absolute Neutrophils 3.33 Thousand/uL      Absolute Lymphocytes 4.44 Thousand/uL      Absolute Monocytes 0.51 Thousand/uL      Absolute Eosinophils 0.17 Thousand/uL      Absolute Basophils 0.09 Thousand/uL      Total Counted --     Smudge Cells Present     RBC Morphology Normal     Platelet Estimate Adequate    RBC Morphology Reflex Test [[id number]] Collected: 05/14/25 2202    Lab Status: In process Specimen: Blood from Arm, Left Updated: 05/14/25 2239    HS Troponin 0hr (reflex protocol) [[id number]]  (Normal) Collected: 05/14/25 2202    Lab Status: Final result Specimen: Blood from Arm, Left Updated: 05/14/25 2235     hs TnI 0hr <2 ng/L     Comprehensive metabolic panel [[id number]]  (Abnormal) Collected: 05/14/25 2202    Lab Status: Final result Specimen: Blood from Arm, Left Updated: 05/14/25 2231     Sodium 139 mmol/L      Potassium 4.0 mmol/L      Chloride 102 mmol/L      CO2 28 mmol/L      ANION GAP 9 mmol/L      BUN 17 mg/dL      Creatinine 1.08 mg/dL      Glucose 86 mg/dL      Calcium 9.5 mg/dL      AST 18 U/L      ALT 17 U/L      Alkaline Phosphatase 29 U/L      Total Protein 7.1 g/dL      Albumin 4.6 g/dL      Total Bilirubin 0.52 mg/dL      eGFR 77 ml/min/1.73sq m     Narrative:      [employer]  guidelines for Chronic Kidney Disease (CKD):     Stage 1 with normal or high GFR (GFR > 90 mL/min/1.73 square meters)    Stage 2 Mild CKD (GFR = 60-89 mL/min/1.73 square meters)    Stage 3A Moderate CKD (GFR = 45-59 mL/min/1.73 square meters)    Stage 3B Moderate CKD (GFR = 30-44 mL/min/1.73 square meters)    Stage 4 Severe CKD (GFR = 15-29 mL/min/1.73 square meters)    Stage 5 End Stage CKD (GFR <15 mL/min/1.73 square meters)  Note: GFR calculation is accurate only with a steady state creatinine            No orders to display       Procedures    ED Medication and Procedure Management   Prior to Admission Medications   Prescriptions Last Dose Informant Patient Reported? Taking?   multivitamin (THERAGRAN) TABS   Yes No   Sig: Take 1 tablet by mouth daily   Patient not taking: Reported on 2025   traMADol (ULTRAM) 50 mg tablet   No No   Sig: Take 1 tablet (50 mg total) by mouth every 12 (twelve) hours as needed for moderate pain 1 tablet every 12 hours as needed moderate pain   Patient not taking: Reported on 2025      Facility-Administered Medications: None     Patient's Medications   Discharge Prescriptions    No medications on file     No discharge procedures on file.  ED SEPSIS DOCUMENTATION   Time reflects when diagnosis was documented in both MDM as applicable and the Disposition within this note       Time User Action Codes Description Comment    2025 10:48 PM Junaita Peralta Add [R07.9] Chest pain                    [1]   Social History  Tobacco Use    Smoking status: Former     Current packs/day: 0.00     Average packs/day: 0.5 packs/day for 20.0 years (10.0 ttl pk-yrs)     Types: Cigarettes     Start date: 1997     Quit date: 2017     Years since quittin.9    Smokeless tobacco: Former    Tobacco comments:     chew   Vaping Use    Vaping status: Never Used   Substance Use Topics    Alcohol use: Yes     Comment: rarely    Drug use: No        Juanita Peralta MD  [phone]  [id number]

## 2025-05-15 NOTE — DISCHARGE INSTRUCTIONS
Follow-up with primary care and cardiology for further care. Contact info provided below if needed.  Use over the counter medications as stated on the bottle as needed for pain control.  Return to the ED with new or worsening symptoms.

## 2025-05-16 ENCOUNTER — OFFICE VISIT (OUTPATIENT)
Dept: OBGYN CLINIC | Facility: CLINIC | Age: 55
End: 2025-05-16
Payer: COMMERCIAL

## 2025-05-16 VITALS — WEIGHT: 155 LBS | BODY MASS INDEX: 22.19 KG/M2 | HEIGHT: 70 IN

## 2025-05-16 DIAGNOSIS — M75.41 IMPINGEMENT SYNDROME OF RIGHT SHOULDER: Primary | ICD-10-CM

## 2025-05-16 LAB
ATRIAL RATE: 54 BPM
P AXIS: 65 DEGREES
PR INTERVAL: 180 MS
QRS AXIS: 28 DEGREES
QRSD INTERVAL: 90 MS
QT INTERVAL: 400 MS
QTC INTERVAL: 379 MS
T WAVE AXIS: 53 DEGREES
VENTRICULAR RATE: 54 BPM

## 2025-05-16 PROCEDURE — 99213 OFFICE O/P EST LOW 20 MIN: CPT | Performed by: ORTHOPAEDIC SURGERY

## 2025-05-16 PROCEDURE — 93010 ELECTROCARDIOGRAM REPORT: CPT | Performed by: INTERNAL MEDICINE

## 2025-05-16 NOTE — PROGRESS NOTES
"Patient Name: Glynn Harper      : 1970       MRN: [id number]   Encounter Provider: Josef Yanez DO   Encounter Date: 25  Encounter department: [facility]         Assessment & Plan  Impingement syndrome of right shoulder  Glynn has right-sided shoulder pain consistent with subacromial impingement and bursitis.  His x-rays are within normal limits.  His strength is appropriate on exam.  I recommended home exercises, formal physical therapy, oral anti-inflammatory medications and potential cortisone injection in the future.  He will begin with oral anti-inflammatory medications and home exercises alone.  If pain persist formal PT or cortisone shot would be a possible treatment option.  Follow-up with me as needed.                Follow-up:  No follow-ups on file.     _____________________________________________________  CHIEF COMPLAINT:  Chief Complaint   Patient presents with    Right Shoulder - Pain       Height 5' 10\" (1.778 m), weight 70.3 kg (155 lb).         SUBJECTIVE:  Glynn Harper is a 54 y.o. male who presents to the office for evaluation for right-sided shoulder pain.  He denies any specific injury or trauma.  He has been feeling increased discomfort in the right shoulder off-and-on for several months.  Today is not as bad as it has been in previous weeks.  He feels intermittent aching throbbing in the superior aspect of the right shoulder.  He has recently started a oral dose of anti-inflammatory medications which have been helping.  He feels less discomfort in the shoulder with activity.  He denies any weakness in the shoulder.  He denies any fall or lifting injury.    PAST MEDICAL HISTORY:  Past Medical History:   Diagnosis Date    Arthritis     left thumb    Kidney stone     right stone    Nosebleed February    PONV (postoperative nausea and vomiting) 2019    bad after ESWL       PAST SURGICAL HISTORY:  Past Surgical History:   Procedure Laterality " Date    EXTRACORPOREAL SHOCK WAVE LITHOTRIPSY      x2    EXTRACORPOREAL SHOCK WAVE LITHOTRIPSY      x3 times total    HERNIA REPAIR  2012    Inguinal hernia repair, left     OTHER SURGICAL HISTORY N/A 01/11/1990    HAD SPLEEN REPAIR SURGERY, TRAUMATIC REPTURE REPAIR    [place] LITHOTRIPSY XTRCORP SHOCK WAVE Right 12/11/2019    Procedure: LITHOTRIPSY EXTRACORPORAL SHOCKWAVE (ESWL);  Surgeon: Fabian Cyr MD;  Location: [facility] MAIN OR;  Service: Urology    SPLENECTOMY, PARTIAL  1990    repaired, traumatic       FAMILY HISTORY:  Family History   Problem Relation Age of Onset    Coronary artery disease Mother     Emphysema Mother         Emphysema/COPD    Skin cancer Mother     Emphysema Father         Emphysema/COPD    Heart attack Father         MI involving other coronary artery     Diabetes Paternal Uncle     Emphysema Family     Diabetes Family     Skin cancer Family     Nephrolithiasis Family     Breast cancer Sister        SOCIAL HISTORY:  Social History[1]    MEDICATIONS:  Current Medications[2]    ALLERGIES:  Allergies[3]      _____________________________________________________  Review of Systems     Right Shoulder Exam     Range of Motion   Active abduction:  normal   Passive abduction:  normal   Extension:  normal   External rotation:  normal   Forward flexion:  normal   Internal rotation 0 degrees:  normal     Muscle Strength   Abduction: 5/5   Internal rotation: 5/5   External rotation: 5/5   Supraspinatus: 5/5   Subscapularis: 5/5   Biceps: 5/5     Tests   Rojas test: positive  Impingement: positive  Drop arm: negative    Other   Erythema: absent  Sensation: normal  Pulse: present             Physical Exam  Vitals reviewed.   Constitutional:       Appearance: He is well-developed.   HENT:      Head: Normocephalic and atraumatic.     Eyes:      Conjunctiva/sclera: Conjunctivae normal.      Pupils: Pupils are equal, round, and reactive to light.       Cardiovascular:      Rate and Rhythm: Normal rate.       Pulses: Normal pulses.   Pulmonary:      Effort: Pulmonary effort is normal. No respiratory distress.     Musculoskeletal:      Cervical back: Normal range of motion and neck supple.      Comments: As noted in HPI     Skin:     General: Skin is warm and dry.     Neurological:      General: No focal deficit present.      Mental Status: He is alert and oriented to person, place, and time.     Psychiatric:         Mood and Affect: Mood normal.         Behavior: Behavior normal.        _____________________________________________________  STUDIES REVIEWED:  I personally reviewed the pertinent images and my independent interpretation is as follows:  Right shoulder x-rays demonstrate no evidence of acute abnormality.    PROCEDURES PERFORMED:  Procedures        This document was created using speech voice recognition software.   Grammatical errors, random word insertions, pronoun errors, and incomplete sentences are an occasional consequence of this system due to software limitations, ambient noise, and hardware issues.   Any formal questions or concerns about content, text, or information contained within the body of this dictation should be directly addressed to the provider for clarification.         [1]   Social History  Tobacco Use    Smoking status: Former     Current packs/day: 0.00     Average packs/day: 0.5 packs/day for 20.0 years (10.0 ttl pk-yrs)     Types: Cigarettes     Start date: 1997     Quit date: 2017     Years since quittin.9    Smokeless tobacco: Former    Tobacco comments:     chew   Vaping Use    Vaping status: Never Used   Substance Use Topics    Alcohol use: Yes     Comment: rarely    Drug use: No   [2]   Current Outpatient Medications:     multivitamin (THERAGRAN) TABS, Take 1 tablet by mouth daily (Patient not taking: Reported on 2025), Disp: , Rfl:     traMADol (ULTRAM) 50 mg tablet, Take 1 tablet (50 mg total) by mouth every 12 (twelve) hours as needed for moderate pain 1  tablet every 12 hours as needed moderate pain (Patient not taking: Reported on 5/14/2025), Disp: 60 tablet, Rfl: 0  [3]   Allergies  Allergen Reactions    Ezetimibe Rash    Levofloxacin Rash    Penicillins Rash     Unsure of reaction    Statins Myalgia

## 2025-05-19 ENCOUNTER — OFFICE VISIT (OUTPATIENT)
Dept: OBGYN CLINIC | Facility: CLINIC | Age: 55
End: 2025-05-19
Payer: COMMERCIAL

## 2025-05-19 ENCOUNTER — APPOINTMENT (OUTPATIENT)
Dept: RADIOLOGY | Facility: CLINIC | Age: 55
End: 2025-05-19
Attending: ORTHOPAEDIC SURGERY
Payer: COMMERCIAL

## 2025-05-19 VITALS — HEIGHT: 70 IN | BODY MASS INDEX: 22.19 KG/M2 | WEIGHT: 155 LBS

## 2025-05-19 DIAGNOSIS — M79.645 THUMB PAIN, LEFT: ICD-10-CM

## 2025-05-19 DIAGNOSIS — M18.12 PRIMARY OSTEOARTHRITIS OF FIRST CARPOMETACARPAL JOINT OF LEFT HAND: Primary | ICD-10-CM

## 2025-05-19 PROCEDURE — 20600 DRAIN/INJ JOINT/BURSA W/O US: CPT | Performed by: ORTHOPAEDIC SURGERY

## 2025-05-19 PROCEDURE — 73140 X-RAY EXAM OF FINGER(S): CPT

## 2025-05-19 PROCEDURE — 99214 OFFICE O/P EST MOD 30 MIN: CPT | Performed by: ORTHOPAEDIC SURGERY

## 2025-05-19 RX ORDER — TRIAMCINOLONE ACETONIDE 40 MG/ML
20 INJECTION, SUSPENSION INTRA-ARTICULAR; INTRAMUSCULAR
Status: COMPLETED | OUTPATIENT
Start: 2025-05-19 | End: 2025-05-19

## 2025-05-19 RX ORDER — LIDOCAINE HYDROCHLORIDE 10 MG/ML
0.5 INJECTION, SOLUTION INFILTRATION; PERINEURAL
Status: COMPLETED | OUTPATIENT
Start: 2025-05-19 | End: 2025-05-19

## 2025-05-19 RX ADMIN — LIDOCAINE HYDROCHLORIDE 0.5 ML: 10 INJECTION, SOLUTION INFILTRATION; PERINEURAL at 08:15

## 2025-05-19 RX ADMIN — TRIAMCINOLONE ACETONIDE 20 MG: 40 INJECTION, SUSPENSION INTRA-ARTICULAR; INTRAMUSCULAR at 08:15

## 2025-05-19 NOTE — PROGRESS NOTES
"Patient Name: lGynn Harper      : 1970       MRN: [id number]   Encounter Provider: Josef Yanez DO   Encounter Date: 25  Encounter department: [facility]         Assessment & Plan  Primary osteoarthritis of first carpometacarpal joint of left hand  Glynn has severe osteoarthritis of his left first CMC joint.  I discussed options with him today which could consist of repeat cortisone injection or referral to our hand surgeon for consultation for CMC arthroplasty.  He would like to continue with injections for now and would consider hand consultation in the future.  First CMC joint injected today without complication.  Follow-up as needed.    Orders:    XR thumb left first digit-min 2v; Future           Follow-up:  No follow-ups on file.     _____________________________________________________  CHIEF COMPLAINT:  Chief Complaint   Patient presents with    Left Thumb - Pain       Height 5' 10\" (1.778 m), weight 70.3 kg (155 lb).         SUBJECTIVE:  Glynn Harper is a 54 y.o. male who presents to the office for evaluation for left-sided thumb pain.  He has a history of severe osteoarthritis in the left first CMC joint.  He has been treated over many years in the past with cortisone injections and even consultation for arthroplasty has been done.  His last cortisone injection was 6 months ago with [facility].  He can no longer see that hand surgeon.  He is requesting a CMC joint injection today.  He has aching throbbing pain about the base of his left thumb and first CMC joint.  He denies any new injury.    PAST MEDICAL HISTORY:  Past Medical History:   Diagnosis Date    Arthritis     left thumb    Kidney stone     right stone    Nosebleed February    PONV (postoperative nausea and vomiting) 2019    bad after ESWL       PAST SURGICAL HISTORY:  Past Surgical History:   Procedure Laterality Date    EXTRACORPOREAL SHOCK WAVE LITHOTRIPSY      x2    EXTRACORPOREAL SHOCK " WAVE LITHOTRIPSY      x3 times total    HERNIA REPAIR  2012    Inguinal hernia repair, left     OTHER SURGICAL HISTORY N/A 01/11/1990    HAD SPLEEN REPAIR SURGERY, TRAUMATIC REPTURE REPAIR    [place] LITHOTRIPSY XTRCORP SHOCK WAVE Right 12/11/2019    Procedure: LITHOTRIPSY EXTRACORPORAL SHOCKWAVE (ESWL);  Surgeon: Fabian Cyr MD;  Location: [facility] MAIN OR;  Service: Urology    SPLENECTOMY, PARTIAL  1990    repaired, traumatic       FAMILY HISTORY:  Family History   Problem Relation Age of Onset    Coronary artery disease Mother     Emphysema Mother         Emphysema/COPD    Skin cancer Mother     Emphysema Father         Emphysema/COPD    Heart attack Father         MI involving other coronary artery     Diabetes Paternal Uncle     Emphysema Family     Diabetes Family     Skin cancer Family     Nephrolithiasis Family     Breast cancer Sister        SOCIAL HISTORY:  Social History[1]    MEDICATIONS:  Current Medications[2]    ALLERGIES:  Allergies[3]      _____________________________________________________  Review of Systems   Constitutional:  Negative for chills, fever and unexpected weight change.   HENT:  Negative for hearing loss, nosebleeds and sore throat.    Eyes:  Negative for pain, redness and visual disturbance.   Respiratory:  Negative for cough, shortness of breath and wheezing.    Cardiovascular:  Negative for chest pain, palpitations and leg swelling.   Gastrointestinal:  Negative for abdominal pain, nausea and vomiting.   Endocrine: Negative for polyphagia and polyuria.   Genitourinary:  Negative for dysuria and hematuria.   Musculoskeletal:         See HPI   Skin:  Negative for rash and wound.   Neurological:  Negative for dizziness, numbness and headaches.   Psychiatric/Behavioral:  Negative for decreased concentration and suicidal ideas. The patient is not nervous/anxious.         Left Hand Exam     Tenderness   Left hand tenderness location: Tenderness over first CMC joint.     Range of Motion    Hand   MP Thumb: abnormal   DIP Thumb: normal     Muscle Strength   :  4/5     Other   Erythema: absent  Sensation: normal  Pulse: present             Physical Exam  Vitals reviewed.   Constitutional:       Appearance: He is well-developed.   HENT:      Head: Normocephalic and atraumatic.     Eyes:      Conjunctiva/sclera: Conjunctivae normal.      Pupils: Pupils are equal, round, and reactive to light.       Cardiovascular:      Rate and Rhythm: Normal rate.      Pulses: Normal pulses.   Pulmonary:      Effort: Pulmonary effort is normal. No respiratory distress.     Musculoskeletal:      Cervical back: Normal range of motion and neck supple.      Comments: As noted in HPI     Skin:     General: Skin is warm and dry.     Neurological:      General: No focal deficit present.      Mental Status: He is alert and oriented to person, place, and time.     Psychiatric:         Mood and Affect: Mood normal.         Behavior: Behavior normal.        _____________________________________________________  STUDIES REVIEWED:  I personally reviewed the pertinent images and my independent interpretation is as follows:  Left thumb x-rays in the office today demonstrate severe first CMC joint osteoarthritis.  No acute fracture.    PROCEDURES PERFORMED:  Small joint arthrocentesis: L thumb CMC    Performed by: Josef Yanez DO  Authorized by: Josef Yanez DO    Universal Protocol:  Consent: Verbal consent obtained  Risks and benefits: risks, benefits and alternatives were discussed  Consent given by: patient  Test results: test results available and properly labeled  Site marked: the operative site was marked  Radiology Images displayed and confirmed. If images not available, report reviewed: imaging studies available  Procedure Details  Location: thumb - L thumb CMC  Needle size: 25 G  Ultrasound guidance: no  Approach: radial  Medications administered: 0.5 mL lidocaine 1 %; 20 mg triamcinolone acetonide 40  mg/mL    Patient tolerance: patient tolerated the procedure well with no immediate complications  Dressing:  Sterile dressing applied              This document was created using speech voice recognition software.   Grammatical errors, random word insertions, pronoun errors, and incomplete sentences are an occasional consequence of this system due to software limitations, ambient noise, and hardware issues.   Any formal questions or concerns about content, text, or information contained within the body of this dictation should be directly addressed to the provider for clarification.         [1]   Social History  Tobacco Use    Smoking status: Former     Current packs/day: 0.00     Average packs/day: 0.5 packs/day for 20.0 years (10.0 ttl pk-yrs)     Types: Cigarettes     Start date: 1997     Quit date: 2017     Years since quittin.9    Smokeless tobacco: Former    Tobacco comments:     chew   Vaping Use    Vaping status: Never Used   Substance Use Topics    Alcohol use: Yes     Comment: rarely    Drug use: No   [2]   Current Outpatient Medications:     multivitamin (THERAGRAN) TABS, Take 1 tablet by mouth daily (Patient not taking: Reported on 2025), Disp: , Rfl:     traMADol (ULTRAM) 50 mg tablet, Take 1 tablet (50 mg total) by mouth every 12 (twelve) hours as needed for moderate pain 1 tablet every 12 hours as needed moderate pain (Patient not taking: Reported on 2025), Disp: 60 tablet, Rfl: 0  [3]   Allergies  Allergen Reactions    Ezetimibe Rash    Levofloxacin Rash    Penicillins Rash     Unsure of reaction    Statins Myalgia

## 2025-05-19 NOTE — PROGRESS NOTES
"Patient Name: Glynn Harper      : 1970       MRN: [id number]   Encounter Provider: Josef Yanez DO   Encounter Date: 25  Encounter department: [facility]         Assessment & Plan  Primary osteoarthritis of first carpometacarpal joint of left hand    Orders:    XR thumb left first digit-min 2v; Future    Small joint arthrocentesis: L thumb CMC           Follow-up:  No follow-ups on file.     _____________________________________________________  CHIEF COMPLAINT:  Chief Complaint   Patient presents with    Left Thumb - Pain       Height 5' 10\" (1.778 m), weight 70.3 kg (155 lb).         SUBJECTIVE:  Glynn Harper is a 54 y.o. male who presents to the office for evaluation for left first CMC joint pain.  He has a history of severe osteoarthritis in the left first CMC joint    PAST MEDICAL HISTORY:  Past Medical History:   Diagnosis Date    Arthritis     left thumb    Kidney stone     right stone    Nosebleed February    PONV (postoperative nausea and vomiting)     bad after ESWL       PAST SURGICAL HISTORY:  Past Surgical History:   Procedure Laterality Date    EXTRACORPOREAL SHOCK WAVE LITHOTRIPSY      x2    EXTRACORPOREAL SHOCK WAVE LITHOTRIPSY      x3 times total    HERNIA REPAIR  2012    Inguinal hernia repair, left     OTHER SURGICAL HISTORY N/A 1990    HAD SPLEEN REPAIR SURGERY, TRAUMATIC REPTURE REPAIR    [place] LITHOTRIPSY XTRCORP SHOCK WAVE Right 2019    Procedure: LITHOTRIPSY EXTRACORPORAL SHOCKWAVE (ESWL);  Surgeon: Fabian Cyr MD;  Location: [facility] MAIN OR;  Service: Urology    SPLENECTOMY, PARTIAL  1990    repaired, traumatic       FAMILY HISTORY:  Family History   Problem Relation Age of Onset    Coronary artery disease Mother     Emphysema Mother         Emphysema/COPD    Skin cancer Mother     Emphysema Father         Emphysema/COPD    Heart attack Father         MI involving other coronary artery     Diabetes Paternal Uncle     " Emphysema Family     Diabetes Family     Skin cancer Family     Nephrolithiasis Family     Breast cancer Sister        SOCIAL HISTORY:  Social History[1]    MEDICATIONS:  Current Medications[2]    ALLERGIES:  Allergies[3]      _____________________________________________________  Review of Systems     Ortho Exam     Physical Exam   _____________________________________________________  STUDIES REVIEWED:  I personally reviewed the pertinent images and my independent interpretation is as follows:      PROCEDURES PERFORMED:  Procedures        This document was created using speech voice recognition software.   Grammatical errors, random word insertions, pronoun errors, and incomplete sentences are an occasional consequence of this system due to software limitations, ambient noise, and hardware issues.   Any formal questions or concerns about content, text, or information contained within the body of this dictation should be directly addressed to the provider for clarification.         [1]   Social History  Tobacco Use    Smoking status: Former     Current packs/day: 0.00     Average packs/day: 0.5 packs/day for 20.0 years (10.0 ttl pk-yrs)     Types: Cigarettes     Start date: 1997     Quit date: 2017     Years since quittin.9    Smokeless tobacco: Former    Tobacco comments:     chew   Vaping Use    Vaping status: Never Used   Substance Use Topics    Alcohol use: Yes     Comment: rarely    Drug use: No   [2]   Current Outpatient Medications:     multivitamin (THERAGRAN) TABS, Take 1 tablet by mouth daily (Patient not taking: Reported on 2025), Disp: , Rfl:     traMADol (ULTRAM) 50 mg tablet, Take 1 tablet (50 mg total) by mouth every 12 (twelve) hours as needed for moderate pain 1 tablet every 12 hours as needed moderate pain (Patient not taking: Reported on 2025), Disp: 60 tablet, Rfl: 0  [3]   Allergies  Allergen Reactions    Ezetimibe Rash    Levofloxacin Rash    Penicillins Rash     Unsure  of reaction    Statins Myalgia

## 2025-05-27 NOTE — PATIENT INSTRUCTIONS
Chest pain  - EKG performed in office today shows no apaprent acute abnormalities, however we are awaiting official cardiology read.   - patient referred to ER for further evaluation, however patient declined, AMA form signed. Patient was informed if symptoms persist, worsen, or any new symptoms present, patient should be seen in the ER immediately.   -patient was informed if he does not want to go to the ER, he is to follow up w/ his PCP office for re-check tomorrow, patient verbalizes understanding.     2. Rib pain   - likely musculoskeletal, no apparent correlation to the chest pain symptoms.   - rib xray shows no acute abnormalities.   - patient is to rest and apply a heating pad to the site.   - take Tylenol as needed for pain.   - may use a muscle rub such as Bengay or Icy Hot.   - follow up w/ pcp office for re-check in 3-5 days.   - if symptoms acutely worsen or any new symptoms present, patient is to be seen in the ER.     3. Right shoulder pain  - xray of the shoulder shows no acute abnormalities.   - patient is to rest and apply ice to the site.   - take Tylenol as needed for pain.   - referral provided to Orthopedics for further evaluation and treatment.

## 2025-05-29 NOTE — PROGRESS NOTES
[facility]        NAME: Glynn Harper is a 54 y.o. male  : 1970    MRN: [id number]  DATE: May 14, 2025  TIME: 5:30 PM     Assessment and Plan   Acute pain of right shoulder [M25.511]  1. Acute pain of right shoulder  XR shoulder 2+ vw right    Ambulatory referral to Orthopedic Surgery      2. Rib pain on left side  XR ribs left w pa chest min 3 views      3. Chest pain, unspecified type  ECG 12 lead        Patient Instructions     Patient Instructions   Chest pain  - EKG performed in office today shows no apaprent acute abnormalities, however we are awaiting official cardiology read.   - patient referred to ER for further evaluation, however patient declined, AMA form signed. Patient was informed if symptoms persist, worsen, or any new symptoms present, patient should be seen in the ER immediately.   -patient was informed if he does not want to go to the ER, he is to follow up w/ his PCP office for re-check tomorrow, patient verbalizes understanding.     2. Rib pain   - likely musculoskeletal, no apparent correlation to the chest pain symptoms.   - rib xray shows no acute abnormalities.   - patient is to rest and apply a heating pad to the site.   - take Tylenol as needed for pain.   - may use a muscle rub such as Bengay or Icy Hot.   - follow up w/ pcp office for re-check in 3-5 days.   - if symptoms acutely worsen or any new symptoms present, patient is to be seen in the ER.     3. Right shoulder pain  - xray of the shoulder shows no acute abnormalities.   - patient is to rest and apply ice to the site.   - take Tylenol as needed for pain.   - referral provided to Orthopedics for further evaluation and treatment.      Follow up with PCP in 3-5 days.  Proceed to  ER if symptoms worsen.    If tests have been performed at [facility], our office will contact you with results if changes need to be made to the care plan discussed with you at the visit.  You can review your full results on [facility]'s  [facility].    Chief Complaint     Chief Complaint   Patient presents with    Shoulder Pain     Pt states right shoulder and left rib area  started to cause pain about 2 weeks ago,no report of falling or injury,nothing has been taken for it at this time.     History of Present Illness     55 yo male presents c/o chest pain for about 2 weeks. The pain is located in the anterior chest wall and is not reproducible with palpation or movements. Patient denies any injury to the chest wall. The pain presents in episodes with no particular preceding events that he can correlate. No associated SOB or palpitations. The pain does not radiate into the arm. No neck or back pain. However patient does state that he has been having left sided rib pain for the past few weeks and pain in his R shoulder, however he believes these are unrelated. He denies any injuries to the ribs, chest wall, or shoulder. No numbness/tingling or weakness of the arms. No swelling, bruising, or redness of the ribs/chest wall or shoulder joint. Patient states his job requires a lot of physical work and attributes the pain or muscle aches and generalized discomfort, but would like to be evaluated since the pain is also in chest and does not appear to be muscular. He has not attempted any treatment for the symptoms and has not been seen for this prior to today's office visit.      Review of Systems   Review of Systems   Constitutional: Negative.    Respiratory: Negative.     Cardiovascular:  Positive for chest pain.   Gastrointestinal: Negative.    Genitourinary: Negative.    Musculoskeletal:         As noted in HPI   Skin: Negative.    Allergic/Immunologic:        As noted in chart   Neurological: Negative.      Current Medications     Current Medications[1]    Current Allergies     Allergies as of 05/14/2025 - Reviewed 05/14/2025   Allergen Reaction Noted    Ezetimibe Rash 08/23/2016    Levofloxacin Rash 11/08/2013    Penicillins Rash 11/08/2013    Statins  "Myalgia 08/23/2016            The following portions of the patient's history were reviewed and updated as appropriate: allergies, current medications, past family history, past medical history, past social history, past surgical history and problem list.     Past Medical History[2]    Past Surgical History[3]    Family History[4]      Medications have been verified.        Objective   /79   Pulse 61   Temp 97.6 °F (36.4 °C)   Resp 19   Ht 5' 10\" (1.778 m)   Wt 70.4 kg (155 lb 3.2 oz)   SpO2 98%   BMI 22.27 kg/m²   No LMP for male patient.       Physical Exam     Physical Exam  Vitals and nursing note reviewed.   Constitutional:       General: He is awake. He is not in acute distress.     Appearance: Normal appearance. He is well-developed and well-groomed. He is not ill-appearing, toxic-appearing or diaphoretic.     Cardiovascular:      Rate and Rhythm: Normal rate and regular rhythm.      Pulses: Normal pulses.      Heart sounds: Normal heart sounds.   Pulmonary:      Effort: Pulmonary effort is normal. No tachypnea, accessory muscle usage or respiratory distress.      Breath sounds: Normal breath sounds and air entry.   Abdominal:      General: Abdomen is flat. Bowel sounds are normal. There is no distension.      Palpations: Abdomen is soft.      Tenderness: There is no abdominal tenderness. There is no right CVA tenderness, left CVA tenderness, guarding or rebound.     Musculoskeletal:      Comments: Ribs/Back/Chest wall: no swelling, erythema, bruising, rashes, or wounds. Skin is appropriately warm and intact, no wounds. There is mild generalized tenderness to palpation in the left lateral rib region. No point tenderness. No step offs palpated. No spinal or paraspinal tenderness.  Right shoulder: no swelling, erythema, bruising, or wounds. Skin is appropriately warm and intact. No significant tenderness to palpation. Patient has good ROM of the shoulder, however experiences pain in the shoulder " joint w/ movement. Strength and sensations are intact.     Skin:     General: Skin is warm and dry.      Capillary Refill: Capillary refill takes less than 2 seconds.      Coloration: Skin is not pale.     Neurological:      Mental Status: He is alert and oriented to person, place, and time. Mental status is at baseline.     Psychiatric:         Mood and Affect: Mood normal.         Behavior: Behavior normal. Behavior is cooperative.         Thought Content: Thought content normal.         Judgment: Judgment normal.                        [1]   Current Outpatient Medications:     multivitamin (THERAGRAN) TABS, Take 1 tablet by mouth daily (Patient not taking: Reported on 5/14/2025), Disp: , Rfl:     traMADol (ULTRAM) 50 mg tablet, Take 1 tablet (50 mg total) by mouth every 12 (twelve) hours as needed for moderate pain 1 tablet every 12 hours as needed moderate pain (Patient not taking: Reported on 5/14/2025), Disp: 60 tablet, Rfl: 0  [2]   Past Medical History:  Diagnosis Date    Arthritis     left thumb    Kidney stone     right stone    Nosebleed February    PONV (postoperative nausea and vomiting) 2019    bad after ESWL   [3]   Past Surgical History:  Procedure Laterality Date    EXTRACORPOREAL SHOCK WAVE LITHOTRIPSY      x2    EXTRACORPOREAL SHOCK WAVE LITHOTRIPSY      x3 times total    HERNIA REPAIR  2012    Inguinal hernia repair, left     OTHER SURGICAL HISTORY N/A 01/11/1990    HAD SPLEEN REPAIR SURGERY, TRAUMATIC REPTURE REPAIR    MD LITHOTRIPSY XTRCORP SHOCK WAVE Right 12/11/2019    Procedure: LITHOTRIPSY EXTRACORPORAL SHOCKWAVE (ESWL);  Surgeon: Fabian Cyr MD;  Location: [facility] MAIN OR;  Service: Urology    SPLENECTOMY, PARTIAL  1990    repaired, traumatic   [4]   Family History  Problem Relation Name Age of Onset    Coronary artery disease Mother      Emphysema Mother          Emphysema/COPD    Skin cancer Mother      Emphysema Father          Emphysema/COPD    Heart attack Father          MI  involving other coronary artery     Diabetes Paternal Uncle Leandro     Emphysema Family      Diabetes Family      Skin cancer Family      Nephrolithiasis Family      Breast cancer Sister Anastacia